# Patient Record
Sex: FEMALE | Race: BLACK OR AFRICAN AMERICAN | Employment: FULL TIME | URBAN - METROPOLITAN AREA
[De-identification: names, ages, dates, MRNs, and addresses within clinical notes are randomized per-mention and may not be internally consistent; named-entity substitution may affect disease eponyms.]

---

## 2020-09-15 ENCOUNTER — OFFICE VISIT (OUTPATIENT)
Dept: FAMILY MEDICINE CLINIC | Age: 25
End: 2020-09-15
Payer: COMMERCIAL

## 2020-09-15 ENCOUNTER — HOSPITAL ENCOUNTER (OUTPATIENT)
Age: 25
Discharge: HOME | End: 2020-09-17
Payer: COMMERCIAL

## 2020-09-15 ENCOUNTER — HOSPITAL ENCOUNTER (OUTPATIENT)
Dept: GENERAL RADIOLOGY | Age: 25
Discharge: HOME | End: 2020-09-17
Payer: COMMERCIAL

## 2020-09-15 VITALS
HEIGHT: 62 IN | WEIGHT: 240 LBS | TEMPERATURE: 97.3 F | SYSTOLIC BLOOD PRESSURE: 120 MMHG | HEART RATE: 75 BPM | OXYGEN SATURATION: 97 % | DIASTOLIC BLOOD PRESSURE: 88 MMHG | BODY MASS INDEX: 44.16 KG/M2

## 2020-09-15 PROBLEM — J45.20 MILD INTERMITTENT ASTHMA WITHOUT COMPLICATION: Status: ACTIVE | Noted: 2020-09-15

## 2020-09-15 PROBLEM — F25.0 SCHIZOAFFECTIVE DISORDER, BIPOLAR TYPE (HCC): Status: ACTIVE | Noted: 2019-01-25

## 2020-09-15 PROCEDURE — 99204 OFFICE O/P NEW MOD 45 MIN: CPT | Performed by: NURSE PRACTITIONER

## 2020-09-15 PROCEDURE — 96160 PT-FOCUSED HLTH RISK ASSMT: CPT | Performed by: NURSE PRACTITIONER

## 2020-09-15 PROCEDURE — 72100 X-RAY EXAM L-S SPINE 2/3 VWS: CPT

## 2020-09-15 RX ORDER — TIZANIDINE 2 MG/1
2 TABLET ORAL 3 TIMES DAILY PRN
Qty: 30 TABLET | Refills: 0 | Status: SHIPPED | OUTPATIENT
Start: 2020-09-15 | End: 2020-09-25

## 2020-09-15 RX ORDER — HYDROXYZINE 50 MG/1
50 TABLET, FILM COATED ORAL DAILY
COMMUNITY
Start: 2019-02-08 | End: 2021-09-09

## 2020-09-15 RX ORDER — LEVALBUTEROL TARTRATE 45 UG/1
AEROSOL, METERED ORAL
Qty: 15 G | Refills: 0 | Status: SHIPPED | OUTPATIENT
Start: 2020-09-15 | End: 2020-09-21

## 2020-09-15 SDOH — ECONOMIC STABILITY: INCOME INSECURITY: HOW HARD IS IT FOR YOU TO PAY FOR THE VERY BASICS LIKE FOOD, HOUSING, MEDICAL CARE, AND HEATING?: HARD

## 2020-09-15 SDOH — ECONOMIC STABILITY: FOOD INSECURITY: WITHIN THE PAST 12 MONTHS, THE FOOD YOU BOUGHT JUST DIDN'T LAST AND YOU DIDN'T HAVE MONEY TO GET MORE.: NEVER TRUE

## 2020-09-15 SDOH — ECONOMIC STABILITY: FOOD INSECURITY: WITHIN THE PAST 12 MONTHS, YOU WORRIED THAT YOUR FOOD WOULD RUN OUT BEFORE YOU GOT MONEY TO BUY MORE.: NEVER TRUE

## 2020-09-15 SDOH — ECONOMIC STABILITY: TRANSPORTATION INSECURITY
IN THE PAST 12 MONTHS, HAS THE LACK OF TRANSPORTATION KEPT YOU FROM MEDICAL APPOINTMENTS OR FROM GETTING MEDICATIONS?: NO

## 2020-09-15 SDOH — ECONOMIC STABILITY: TRANSPORTATION INSECURITY
IN THE PAST 12 MONTHS, HAS LACK OF TRANSPORTATION KEPT YOU FROM MEETINGS, WORK, OR FROM GETTING THINGS NEEDED FOR DAILY LIVING?: NO

## 2020-09-15 ASSESSMENT — PATIENT HEALTH QUESTIONNAIRE - PHQ9
9. THOUGHTS THAT YOU WOULD BE BETTER OFF DEAD, OR OF HURTING YOURSELF: 1
10. IF YOU CHECKED OFF ANY PROBLEMS, HOW DIFFICULT HAVE THESE PROBLEMS MADE IT FOR YOU TO DO YOUR WORK, TAKE CARE OF THINGS AT HOME, OR GET ALONG WITH OTHER PEOPLE: 2
7. TROUBLE CONCENTRATING ON THINGS, SUCH AS READING THE NEWSPAPER OR WATCHING TELEVISION: 2
3. TROUBLE FALLING OR STAYING ASLEEP: 3
8. MOVING OR SPEAKING SO SLOWLY THAT OTHER PEOPLE COULD HAVE NOTICED. OR THE OPPOSITE, BEING SO FIGETY OR RESTLESS THAT YOU HAVE BEEN MOVING AROUND A LOT MORE THAN USUAL: 1
2. FEELING DOWN, DEPRESSED OR HOPELESS: 3
5. POOR APPETITE OR OVEREATING: 3
SUM OF ALL RESPONSES TO PHQ QUESTIONS 1-9: 22
SUM OF ALL RESPONSES TO PHQ9 QUESTIONS 1 & 2: 6
6. FEELING BAD ABOUT YOURSELF - OR THAT YOU ARE A FAILURE OR HAVE LET YOURSELF OR YOUR FAMILY DOWN: 3
SUM OF ALL RESPONSES TO PHQ QUESTIONS 1-9: 22
1. LITTLE INTEREST OR PLEASURE IN DOING THINGS: 3
4. FEELING TIRED OR HAVING LITTLE ENERGY: 3

## 2020-09-15 ASSESSMENT — COLUMBIA-SUICIDE SEVERITY RATING SCALE - C-SSRS
6. HAVE YOU EVER DONE ANYTHING, STARTED TO DO ANYTHING, OR PREPARED TO DO ANYTHING TO END YOUR LIFE?: YES
1. WITHIN THE PAST MONTH, HAVE YOU WISHED YOU WERE DEAD OR WISHED YOU COULD GO TO SLEEP AND NOT WAKE UP?: YES
2. HAVE YOU ACTUALLY HAD ANY THOUGHTS OF KILLING YOURSELF?: NO
7. DID THIS OCCUR IN THE LAST THREE MONTHS: NO

## 2020-09-15 NOTE — PATIENT INSTRUCTIONS
flu, or pneumonia. · Exercise. · Dry, cold air. Here are some ways to avoid a few common triggers:  · Do not smoke or allow others to smoke around you. If you need help quitting, talk to your doctor about stop-smoking programs and medicines. These can increase your chances of quitting for good. · If there is a lot of pollution, pollen, or dust outside, stay at home and keep your windows closed. Use an air conditioner or air filter in your home. Check your local weather report or newspaper for air quality and pollen reports. · Get the flu vaccine every year. Talk to your doctor about getting a pneumococcal shot. Wash your hands often to prevent infections. · Avoid exercising outdoors in cold weather. If you are outdoors in cold weather, wear a scarf around your face and breathe through your nose. How is asthma treated? There are two parts to treating asthma, which are outlined in your asthma action plan. The goals are to:  · Control asthma over the long term. The asthma action plan tells you which medicine you may need to take every day. This is called a controller medicine. It helps to reduce the swelling of the airways and prevent asthma attacks. · Treat asthma attacks when they occur. The asthma action plan tells you what to do when you have an asthma attack. It helps you identify triggers that can cause your attacks. You use quick-relief medicine during an attack. The asthma plan also helps you track your symptoms and know how well the treatment is working. Follow-up care is a key part of your treatment and safety. Be sure to make and go to all appointments, and call your doctor if you are having problems. It's also a good idea to know your test results and keep a list of the medicines you take. Where can you learn more? Go to [email or web address] org and sign in to your [employer] account. Enter O495 in the [facility] box to learn more about \"Learning About Asthma. \"     If you do not have an account, please click on the \"Sign Up Now\" link. Current as of: February 24, 2020               Content Version: 12.5  © 2006-2020 Healthwise, Incorporated. Care instructions adapted under license by [facility] ([facility]). If you have questions about a medical condition or this instruction, always ask your healthcare professional. [address] any warranty or liability for your use of this information.

## 2020-09-15 NOTE — PROGRESS NOTES
disorder, bipolar type [facility])     Past Medical History:   Diagnosis Date    Allergic rhinitis     Asthma     Benign neoplasm of skin, site unspecified     Depression       Past Surgical History:   Procedure Laterality Date    LAPAROSCOPY  12-31-14    with peritoneal washings     Family History   Problem Relation Age of Onset    High Blood Pressure Mother     Kidney Disease Maternal Aunt     Heart Attack Maternal Aunt     Diabetes Maternal Grandmother     Diabetes Maternal Grandfather     Diabetes Paternal Grandmother     Stroke Paternal Grandmother     Heart Attack Paternal Grandmother     Diabetes Paternal Grandfather      Social History     Tobacco Use    Smoking status: Never Smoker    Smokeless tobacco: Never Used   Substance Use Topics    Alcohol use: Yes     Comment: social     ALLERGIES:    Allergies   Allergen Reactions    Flonase [Fluticasone] Hives          Subjective   Review of Systems   · Constitutional:  Negative for activity change, appetite change,unexpected weight change, chills, fever, and fatigue. · HENT: Negative for ear pain, sore throat,  Rhinorrhea, sinus pain, sinus pressure, congestion. · Eyes:  Negative for pain and discharge. · Respiratory:  Negative for chest tightness, shortness of breath, wheezing, and cough. · Cardiovascular:  Negative for chest pain, palpitations and leg swelling. · Gastrointestinal: Negative for abdominal pain, blood in stool, constipation,diarrhea, nausea and vomiting. · Endocrine: Negative for cold intolerance, heat intolerance, polydipsia, polyphagia and polyuria. · Genitourinary: Negative for difficulty urinating, dysuria, flank pain, frequency, hematuria and urgency. · Musculoskeletal: Negative for arthralgias, back pain, joint swelling, myalgias, neck pain and neck stiffness. Low back pain  · Skin: Negative for rash and wound. · Allergic/Immunologic: Negative for environmental allergies and food allergies.    · Neurological: Negative for dizziness, light-headedness, numbness and headaches. · Hematological:  Negative for adenopathy. Does not bruise/bleed easily. · Psychiatric/Behavioral: Negative for self-injury, sleep disturbance and suicidal ideas. Anxiety/depression    Objective   Physical Exam   PHYSICAL EXAM:   · Constitutional: Ki is oriented to person, place, and time. Vital signs are normal. Appears well-developed and well-nourished. She is obese  · HEENT:   · Head: Normocephalic and atraumatic. Right Ear: Hearing and external ear normal. TM normal  Canal normal  · Left Ear: Hearing and external ear normal. TM normal Canal normal  · Nose: Nares normal. Septum midline. No drainage or sinus tenderness. Mucosa pink and moist  · Mouth/Throat: Oropharynx- No erythema, no exudate. Uvula midline, no erythema, no edema. Mucous membranes are pink and moist.   · Eyes:PERRL, EOMI, Conjunctiva normal, No discharge. · Neck: Full passive range of motion. Non-tender on palpation. Neck supple. No thyromegaly present. Trachea normal.  · Cardiovascular: Normal rate, regular rhythm, S1, S2, no murmur, no gallop, no friction rub, intact distal pulses. · Pulmonary/Chest: Breath sounds are clear throughout, No respiratory distress, No wheezing, No chest tenderness. Effort normal  · Abdominal: Soft. Normal appearance, bowel sounds are present and normoactive. There is no hepatosplenomegaly. There is no tenderness. There is no CVA tenderness. · Musculoskeletal: Extremities appear regular and symmetric. No evident masses, lesions, foreign bodies, or other abnormalities. No edema. No tenderness on palpation. Joints are stable. Full ROM, strength and tone are within normal limits. Right lumbar back tenderness on palpation. · Lymphadenopathy: No lymphadenopathy noted. · Neurological: Alert and oriented to person, place, and time. Normal motor function, Normal sensory function, No focal deficits noted. He has normal strength.   · Skin: Skin is warm, dry and intact. No obvious lesions on exposed skin  · Psychiatric: Normal mood and affect. Speech is normal and behavior is normal.     Nursing note and vitals reviewed. Blood pressure 120/88, pulse 75, temperature 97.3 °F (36.3 °C), temperature source Temporal, height 5' 2\" (1.575 m), weight 240 lb (108.9 kg), SpO2 97 %. Body mass index is 43.9 kg/m². Wt Readings from Last 3 Encounters:   09/15/20 240 lb (108.9 kg)   12/28/18 221 lb 6.4 oz (100.4 kg)   05/23/18 216 lb (98 kg)     BP Readings from Last 3 Encounters:   09/15/20 120/88   12/28/18 114/72   05/23/18 108/66       Hospital Outpatient Visit on 12/31/2014   Component Date Value Ref Range Status    hCG Qual 12/31/2014 NEGATIVE  NEGATIVE Final    Comment: Test results should always be evaluated with all available  clinical data.  ABO/Rh 12/31/2014 B POS   Final    Antibody Screen 12/31/2014 NEG   Final     No results found for this visit on 09/15/20. Completed Orders/Prescriptions   Orders Placed This Encounter   Medications    tiZANidine (ZANAFLEX) 2 MG tablet     Sig: Take 1 tablet by mouth 3 times daily as needed (pain)     Dispense:  30 tablet     Refill:  0    levalbuterol (XOPENEX HFA) 45 MCG/ACT inhaler     Sig: INHALE 1-2 PUFFS EVERY 4 HOURS AS NEEDED FOR WHEEZING     Dispense:  15 g     Refill:  0               AssessmentPlan/Medical Decision Making     1. Encounter to establish care      2. Anxiety  - Continue with counselor    3. Severe depression ([address]  -Continue with counselor    4. Injury of low back, subsequent encounter  - Heat as needed for discomfort  - Continue with tylenol/motrin for pain  - XR LUMBAR SPINE (2-3 VIEWS); Future  - tiZANidine (ZANAFLEX) 2 MG tablet; Take 1 tablet by mouth 3 times daily as needed (pain)  Dispense: 30 tablet; Refill: 0    5.  Mild intermittent asthma without complication    - levalbuterol (XOPENEX HFA) 45 MCG/ACT inhaler; INHALE 1-2 PUFFS EVERY 4 HOURS AS NEEDED FOR WHEEZING Dispense: 15 g; Refill: 0      No follow-ups on file. 1.  Ki received counseling on the following healthy behaviors: nutrition, exercise and medication adherence  2. Patient given educational materials - see patient instructions  3. Was a self-tracking handout given in paper form or via [employer]? No  If yes, see orders or list here. 4.  Discussed use, benefit, and side effects of prescribed medications. Barriers to medication compliance addressed. All patient questions answered. Pt voiced understanding. 5.  Reviewed prior labs, imaging, consultation, follow up, and health maintenance  6. Continue current medications, diet and exercise. 7. Discussed use, benefit, and side effects of prescribed medications. Barriers to medication compliance addressed. All her questions were answered. Pt voiced understanding. Ki will continue current medications, diet and exercise. Patient given educational materials on asthma    Of the 45 minute duration appointment visit, Valorie Hickey CNP spent at least 50% of the face-to-face time in counseling, explanation of diagnosis, planning of further management, and answering all questions.           Signed:  Valorie Hickey CNP

## 2020-09-16 NOTE — TELEPHONE ENCOUNTER
[facility] is calling to request a refill on the following medication(s):    Medication Request:  Requested Prescriptions     Pending Prescriptions Disp Refills    levalbuterol (XOPENEX HFA) 45 MCG/ACT inhaler 15 g 0     Sig: INHALE 1-2 PUFFS EVERY 4 HOURS AS NEEDED FOR WHEEZING    tiZANidine (ZANAFLEX) 2 MG tablet 30 tablet 0     Sig: Take 1 tablet by mouth 3 times daily as needed (pain)       Last Visit Date (If Applicable):  8/90/0334    Next Visit Date:    3/15/2021          Patient was not covered at [facility] any longer and needs it sent to [facility]

## 2020-09-17 RX ORDER — LEVALBUTEROL TARTRATE 45 UG/1
AEROSOL, METERED ORAL
Qty: 15 G | Refills: 0 | OUTPATIENT
Start: 2020-09-17

## 2020-09-17 RX ORDER — TIZANIDINE 2 MG/1
2 TABLET ORAL 3 TIMES DAILY PRN
Qty: 30 TABLET | Refills: 0 | OUTPATIENT
Start: 2020-09-17 | End: 2020-09-27

## 2020-09-21 ENCOUNTER — HOSPITAL ENCOUNTER (OUTPATIENT)
Age: 25
Discharge: HOME | End: 2020-09-21
Payer: COMMERCIAL

## 2020-09-21 ENCOUNTER — OFFICE VISIT (OUTPATIENT)
Dept: OBGYN CLINIC | Age: 25
End: 2020-09-21
Payer: COMMERCIAL

## 2020-09-21 ENCOUNTER — HOSPITAL ENCOUNTER (OUTPATIENT)
Age: 25
Setting detail: SPECIMEN
Discharge: HOME | End: 2020-09-21
Payer: COMMERCIAL

## 2020-09-21 VITALS
BODY MASS INDEX: 44.39 KG/M2 | TEMPERATURE: 99 F | DIASTOLIC BLOOD PRESSURE: 78 MMHG | HEIGHT: 62 IN | WEIGHT: 241.2 LBS | SYSTOLIC BLOOD PRESSURE: 120 MMHG

## 2020-09-21 LAB
ABSOLUTE EOS #: 0.2 K/UL (ref 0–0.44)
ABSOLUTE IMMATURE GRANULOCYTE: 0.03 K/UL (ref 0–0.3)
ABSOLUTE LYMPH #: 2.29 K/UL (ref 1.1–3.7)
ABSOLUTE MONO #: 0.78 K/UL (ref 0.1–1.2)
BASOPHILS # BLD: 1 % (ref 0–2)
BASOPHILS ABSOLUTE: 0.05 K/UL (ref 0–0.2)
DIFFERENTIAL TYPE: ABNORMAL
EOSINOPHILS RELATIVE PERCENT: 2 % (ref 1–4)
ESTIMATED AVERAGE GLUCOSE: 108 MG/DL
FOLLICLE STIMULATING HORMONE: 6.8 U/L (ref 1.7–21.5)
HBA1C MFR BLD: 5.4 % (ref 4–6)
HCG QUANTITATIVE: <1 IU/L
HCT VFR BLD CALC: 44.5 % (ref 36.3–47.1)
HEMOGLOBIN: 13.9 G/DL (ref 11.9–15.1)
IMMATURE GRANULOCYTES: 0 %
LH: 15.7 U/L (ref 1–95.6)
LYMPHOCYTES # BLD: 22 % (ref 24–43)
MCH RBC QN AUTO: 26.6 PG (ref 25.2–33.5)
MCHC RBC AUTO-ENTMCNC: 31.2 G/DL (ref 28.4–34.8)
MCV RBC AUTO: 85.1 FL (ref 82.6–102.9)
MONOCYTES # BLD: 8 % (ref 3–12)
NRBC AUTOMATED: 0 PER 100 WBC
PDW BLD-RTO: 13 % (ref 11.8–14.4)
PLATELET # BLD: 268 K/UL (ref 138–453)
PLATELET ESTIMATE: ABNORMAL
PMV BLD AUTO: 11 FL (ref 8.1–13.5)
PROLACTIN: 18.98 UG/L (ref 4.79–23.3)
RBC # BLD: 5.23 M/UL (ref 3.95–5.11)
RBC # BLD: ABNORMAL 10*6/UL
SEG NEUTROPHILS: 67 % (ref 36–65)
SEGMENTED NEUTROPHILS ABSOLUTE COUNT: 7.1 K/UL (ref 1.5–8.1)
TSH SERPL DL<=0.05 MIU/L-ACNC: 1.83 MIU/L (ref 0.3–5)
WBC # BLD: 10.5 K/UL (ref 3.5–11.3)
WBC # BLD: ABNORMAL 10*3/UL

## 2020-09-21 PROCEDURE — 99385 PREV VISIT NEW AGE 18-39: CPT | Performed by: STUDENT IN AN ORGANIZED HEALTH CARE EDUCATION/TRAINING PROGRAM

## 2020-09-21 PROCEDURE — 36415 COLL VENOUS BLD VENIPUNCTURE: CPT

## 2020-09-21 PROCEDURE — 83036 HEMOGLOBIN GLYCOSYLATED A1C: CPT

## 2020-09-21 PROCEDURE — 83002 ASSAY OF GONADOTROPIN (LH): CPT

## 2020-09-21 PROCEDURE — 81025 URINE PREGNANCY TEST: CPT | Performed by: STUDENT IN AN ORGANIZED HEALTH CARE EDUCATION/TRAINING PROGRAM

## 2020-09-21 PROCEDURE — 84146 ASSAY OF PROLACTIN: CPT

## 2020-09-21 PROCEDURE — 84702 CHORIONIC GONADOTROPIN TEST: CPT

## 2020-09-21 PROCEDURE — 84443 ASSAY THYROID STIM HORMONE: CPT

## 2020-09-21 PROCEDURE — 85025 COMPLETE CBC W/AUTO DIFF WBC: CPT

## 2020-09-21 PROCEDURE — 83001 ASSAY OF GONADOTROPIN (FSH): CPT

## 2020-09-21 NOTE — PROGRESS NOTES
[facility]   [place] [address] [address] [facility], [address]      [address]  9/21/2020                       Primary Care Physician: ARIAN Gtz CNP    CC:   Chief Complaint   Patient presents with    Amenorrhea     irregular menses since she had first period at 13 - BC (pill) didnt seem to help (still missed periods, cramps etc.)    Annual Exam     last pap ???         HPI: [address] is a 26 y.o. female [place] Patient's last menstrual period was 04/24/2020 (exact date). The patient was seen and examined. She is here for an annual visit. She is complaining of irregular menses. The patient has a history of irregular menses since she began her period around age 16. The patient says that her periods will come once every other month to once every 3 months and last 4 to 5 days depending on her amount of stress. However, the patient has not had a period since the end of April. About 2 weeks ago, she started with a very mild spotting which has persisted. However, this is not like a regular period to her. The patient has a history of painful periods in which she is tried NSAIDs and OCPs without relief. She also has had a laparoscopy which was negative for endometriosis. The patient did try birth control pills and the Ortho Evra patch to help regulate her periods, which were unsuccessful. The patient's goal is to try and find out why she is having these irregular periods. She is not sexually active at this time but has been in the past.    Her bowel habits are regular. She denies any bloating. She denies dysuria. She denies urinary leaking. She denies vaginal discharge. She is not sexually active.     Depression Screen: Symptoms of decreased mood absent  Symptoms of anhedonia absent  **If either question is answered in a  positive fashion then complete the PHQ9 Scoring Evaluation and make the appropriate referral**    REVIEW OF SYSTEMS:   Constitutional: negative fever, negative chills  HEENT: negative visual disturbances, negative headaches  Respiratory: negative dyspnea, negative cough  Cardiovascular: negative chest pain,  negative palpitations  Gastrointestinal: negative abdominal pain, negative RUQ pain, negative N/V, negative diarrhea, negative constipation  Genitourinary: negative dysuria, negative vaginal discharge, irregular menses  Dermatological: negative rash  Hematologic: negative bruising  Immunologic/Lymphatic: negative recent illness, negative recent sick contact  Musculoskeletal: negative back pain, negative myalgias, negative arthralgias  Neurological:  negative dizziness, negative weakness  Behavior/Psych: negative depression, negative anxiety      GYNECOLOGICAL HISTORY:  Age of Menarche: 16  Age of Menopause: N/A     STD History: no past history    Pap History: Patient does not recall date of the last Pap test but reports the results as normal.  Colposcopy History: denies    Permanent Sterilization: no  Reversible Birth Control: no  Hormone Replacement Exposure: no    OBSTETRICAL HISTORY:  OB History    Para Term  AB Living   0 0 0 0 0 0   SAB TAB Ectopic Molar Multiple Live Births   0 0 0 0 0 0       PAST MEDICAL HISTORY:   has a past medical history of Allergic rhinitis, Anxiety, Asthma, Benign neoplasm of skin, site unspecified, Depression, and PTSD (post-traumatic stress disorder). PAST SURGICAL HISTORY:   has a past surgical history that includes laparoscopy (15) and [place] tooth extraction. ALLERGIES:  is allergic to flonase [fluticasone]. MEDICATIONS:  Prior to Admission medications    Medication Sig Start Date End Date Taking?  Authorizing Provider   hydrOXYzine (ATARAX) 25 MG tablet Take 1 tablet by mouth 3 times daily as needed 19  Yes Historical Provider, MD   tiZANidine (ZANAFLEX) 2 MG tablet Take 1 tablet by mouth 3 times daily as needed (pain) 9/15/20 9/25/20 Yes ARIAN Gtz - ELIZABETH       FAMILY HISTORY:  Family History of Breast, Ovarian, Colon or Uterine Cancer: No   family history includes Diabetes in her maternal grandfather, maternal grandmother, paternal grandfather, and paternal grandmother; Heart Attack in her maternal aunt and paternal grandmother; High Blood Pressure in her mother; Kidney Disease in her maternal aunt; No Known Problems in her father; Ovarian Cancer in her maternal grandmother; Stroke in her paternal grandmother. SOCIAL HISTORY:   reports that she has never smoked. She has never used smokeless tobacco. She reports current alcohol use. She reports current drug use. Drug: Marijuana. HEALTH MAINTENANCE:  Immunization status: stated as up to date, no records available  Garidisil immunization: per patient, has received Gardisil but no records available. ROUTINE GYN HEALTH MAINTENANCE  Mammogram: N/A  Colonoscopy: N/A  Pap Smears: No records. Completed 9/21/20  Family Planning: None  DEXA: N/A    VITALS:  Vitals:    09/21/20 0806   BP: 120/78   Site: Right Upper Arm   Position: Sitting   Cuff Size: Large Adult   Temp: 99 °F (37.2 °C)   Weight: 241 lb 3.2 oz (109.4 kg)   Height: 5' 2\" (1.575 m)                                                                                                                                                                                                        PHYSICAL EXAM:   General Appearance: Appears healthy. Alert; in no acute distress. Pleasant. Skin: Skin color, texture, turgor normal. No rashes or lesions. HEENT: normocephalic and atraumatic, Thyroid normal to inspection and palpation  Respiratory: Normal expansion. Clear to auscultation. No rales, rhonchi, or wheezing.   Cardiovascular: normal rate, normal S1 and S2, no gallops, intact distal pulses and no carotid bruits  Breast:  (Chest): normal appearance, no masses or tenderness  Abdomen: soft, non-tender, non-distended, no right upper quadrant tenderness and no CVA tenderness  Pelvic Exam:   External genitalia: General appearance; normal, Hair distribution; normal, Lesions absent  Urinary system: urethral meatus normal  Vaginal: normal mucosa, no discharge  Cervix: normal appearing cervix without discharge or lesions  Adnexa: non-palpable  Uterus: normal single, nontender  Rectal Exam: exam declined by patient  Musculoskeletal: no gross abnormalities  Extremities: non-tender BLE and non-edematous  Psych:  oriented to time, place and person     DATA:  No results found for this visit on 20. ASSESSMENT & PLAN:    Reddy Roldan is a 26 y.o. female  Patient's last menstrual period was 2020 (exact date). Annual:   Mammogram: N/A   Colonoscopy: N/A   Pap Smears: No records. Completed 20   Family Planning: None   DEXA: N/A    Irregular Menses   - CBC, 2 hr GTT, FSH, LH, HbA1C, TSH, HCG, Prolactin, TVUS ordered   - RTO in 2 months for results follow up    Patient Active Problem List    Diagnosis Date Noted    Irregular menses 2020    Anxiety 09/15/2020    Severe depression ([facility] [address] 09/15/2020    Mild intermittent asthma without complication     Chronic post-traumatic stress disorder (PTSD) 2019    Seasonal allergies 2017    Pelvic pain in female 2014       Return in about 2 months (around 2020) for Results Follow up. ROUTINE GYN HEALTH MAINTENANCE  Mammogram: N/A  Colonoscopy: N/A  Pap Smears: No records. Completed 20  Family Planning: None  DEXA: N/A      Counseling Completed:    Discussed need for repeat pap as per American Society for Colposcopy and Cervical Pathology guidelines. Discussed need for mammograms every 1 year, If >42 yo and last mammogram was negative. Discussed Calcium and Vitamin D dosing. Discussed need for colonoscopy screening as well as onset for bone density testing. Discussed birth control and barrier recommendations. Discussed STD counseling and prevention.   Discussed Gardisil counseling for all patients 10-37 yo. Hereditary Breast, Ovarian, Colon and Uterine Cancer screening discussed. Tobacco & Secondary smoke risks discussed; with recommendation for cessation and avoidance. Routine health maintenance per patients PCP discussed. Diagnosis Orders   1. Women's annual routine gynecological examination  PAP SMEAR   2.  Irregular menses  PAP SMEAR    POCT urine pregnancy    CBC Auto Differential    TSH with Reflex    HCG, Quantitative, Pregnancy    US NON OB TRANSVAGINAL    Hemoglobin A1C    Glucose Tolerance, 2 Hrs    Luteinizing Hormone    Follicle Stimulating Hormone    US PELVIS COMPLETE    Prolactin        Amelia Scott DO  [address] [facility], [address]  9/21/2020, 8:40 AM

## 2020-09-25 ENCOUNTER — PROCEDURE VISIT (OUTPATIENT)
Dept: OBGYN CLINIC | Age: 25
End: 2020-09-25
Payer: COMMERCIAL

## 2020-09-25 PROCEDURE — 76856 US EXAM PELVIC COMPLETE: CPT | Performed by: OBSTETRICS & GYNECOLOGY

## 2020-09-25 PROCEDURE — 76830 TRANSVAGINAL US NON-OB: CPT | Performed by: OBSTETRICS & GYNECOLOGY

## 2020-09-30 ENCOUNTER — HOSPITAL ENCOUNTER (OUTPATIENT)
Age: 25
Discharge: HOME | End: 2020-09-30
Payer: COMMERCIAL

## 2020-09-30 ENCOUNTER — TELEPHONE (OUTPATIENT)
Dept: OBGYN CLINIC | Age: 25
End: 2020-09-30

## 2020-09-30 ENCOUNTER — TELEPHONE (OUTPATIENT)
Dept: FAMILY MEDICINE CLINIC | Age: 25
End: 2020-09-30

## 2020-09-30 LAB
AMOUNT GLUCOSE GIVEN: 75 G
GLUCOSE FASTING: 113 MG/DL (ref 65–99)
GLUCOSE TOLERANCE TEST 1 HOUR: 120 MG/DL (ref 65–184)
GLUCOSE TOLERANCE TEST 2 HOUR: 129 MG/DL (ref 65–139)

## 2020-09-30 PROCEDURE — 36415 COLL VENOUS BLD VENIPUNCTURE: CPT

## 2020-09-30 PROCEDURE — 82951 GLUCOSE TOLERANCE TEST (GTT): CPT

## 2020-09-30 NOTE — TELEPHONE ENCOUNTER
27 y/o NP of  calling for her 2hr GTT results from today. Informed pt that provider is currently in surgery and will give her a call back with plan of care. Pt agreeable to plan.

## 2020-09-30 NOTE — TELEPHONE ENCOUNTER
Attempted to call patient about results with no answer.     Megan Marashly [id number]   9/30/2020, 4:02 PM

## 2020-09-30 NOTE — TELEPHONE ENCOUNTER
Pts insurance will no longer cover her xopenex inhaler and she wants to know if you will send rx for another inhaler?

## 2020-10-01 LAB — CYTOLOGY REPORT: NORMAL

## 2020-10-07 RX ORDER — TIZANIDINE 2 MG/1
2 TABLET ORAL 3 TIMES DAILY PRN
Qty: 30 TABLET | Refills: 0 | Status: SHIPPED | OUTPATIENT
Start: 2020-10-07 | End: 2020-12-14 | Stop reason: ALTCHOICE

## 2020-10-08 RX ORDER — ALBUTEROL SULFATE 90 UG/1
2 AEROSOL, METERED RESPIRATORY (INHALATION) EVERY 6 HOURS PRN
Qty: 1 INHALER | Refills: 0 | Status: SHIPPED | OUTPATIENT
Start: 2020-10-08 | End: 2022-06-27 | Stop reason: SDUPTHER

## 2020-10-30 ENCOUNTER — OFFICE VISIT (OUTPATIENT)
Dept: ORTHOPEDIC SURGERY | Age: 25
End: 2020-10-30
Payer: COMMERCIAL

## 2020-10-30 VITALS — RESPIRATION RATE: 15 BRPM | HEIGHT: 62 IN | WEIGHT: 237 LBS | TEMPERATURE: 97.9 F | BODY MASS INDEX: 43.61 KG/M2

## 2020-10-30 PROCEDURE — G8427 DOCREV CUR MEDS BY ELIG CLIN: HCPCS | Performed by: ORTHOPAEDIC SURGERY

## 2020-10-30 PROCEDURE — 99204 OFFICE O/P NEW MOD 45 MIN: CPT | Performed by: ORTHOPAEDIC SURGERY

## 2020-10-30 PROCEDURE — 1036F TOBACCO NON-USER: CPT | Performed by: ORTHOPAEDIC SURGERY

## 2020-10-30 PROCEDURE — G8417 CALC BMI ABV UP PARAM F/U: HCPCS | Performed by: ORTHOPAEDIC SURGERY

## 2020-10-30 PROCEDURE — G8484 FLU IMMUNIZE NO ADMIN: HCPCS | Performed by: ORTHOPAEDIC SURGERY

## 2020-10-30 RX ORDER — ESCITALOPRAM OXALATE 20 MG/1
20 TABLET ORAL DAILY
COMMUNITY
End: 2022-06-17 | Stop reason: CLARIF

## 2020-10-30 RX ORDER — PRAZOSIN HYDROCHLORIDE 2 MG/1
2 CAPSULE ORAL NIGHTLY
COMMUNITY

## 2020-10-30 RX ORDER — NICOTINE POLACRILEX 4 MG/1
20 GUM, CHEWING ORAL DAILY
Qty: 20 TABLET | Refills: 0 | Status: SHIPPED | OUTPATIENT
Start: 2020-10-30 | End: 2021-03-24

## 2020-10-30 NOTE — PROGRESS NOTES
valgus pes planus  Vascular: Toes warm and well perfused, compartments soft/compressible. No significant swelling of foot. Skin: Intact without rash/lesions/AV malformations. Strength: Able to fire/perform the following with appropriate strength:    [x]  Tib Ant:     [x]  Gastroc-Soleus:         [x]  Inversion:    [x]  Eversion:         [x]  FHL:     [x]  EHL:      Motion:  Normal for the following joints:    [x]  Ankle:      [x]  Subtalar:        [x]  1st MTP:      []  1st TMT:            Tenderness to Palpation:    Tenderness to palpation: None  -no nodules/thickening of Achilles tendon palpated  -no palpable gap of Achilles tendon noted  -no pain with resisted plantarflexion      LLE:  Alignment:  Heel valgus pes planus  Vascular: Toes warm and well perfused, compartments soft/compressible. No significant swelling of foot. Skin: Intact without rash/lesions/AV malformations. Strength: Able to fire/perform the following with appropriate strength:    [x]  Tib Ant:     [x]  Gastroc-Soleus:         [x]  Inversion:    [x]  Eversion:         [x]  FHL:     [x]  EHL:      Motion:  Normal for the following joints:    [x]  Ankle:      [x]  Subtalar:        [x]  1st MTP:      []  1st TMT:            Tenderness to Palpation:    Tenderness to palpation: Posterior aspect of the calcaneus at the insertion of the Achilles tendon with palpable/visible prominence  -no nodules/thickening of Achilles tendon palpated  -no palpable gap of Achilles tendon noted  -no pain with resisted plantarflexion      RADIOLOGY:   10/30/2020 FINDINGS:  Three weightbearing views (AP, Mortise, and Lateral) of the left ankle and three weightbearing views (AP, Oblique, Lateral) of the left foot were obtained in the office today and reviewed, revealing no acute fracture, dislocation, or radioopaque foreign body/tumor. The ankle mortise is maintained with no widening of the clear spaces. Overall alignment is satisfactory.      IMPRESSION:  No acute fracture/dislocation. Electronically signed by Jose Oliver MD      ASSESSMENT AND PLAN:  Braulio Phillip was seen today for Ankle Pain (left ankle)  The encounter diagnosis was Achilles tendinitis of left lower extremity. Body mass index is 43.35 kg/m². She has left insertional Achilles tendinitis. Notably, she has the past medical history as above, including but not limited to the following:  Asthma, anxiety/PTSD/depression. I had a long discussion today with the patient about the likely diagnosis and its natural history, physical exam and imaging findings, as well as treatment options in detail. We discussed rest/activity modification, swelling control, NSAIDs/Acetaminophen/topical anesthetics, orthotics/shoewear modification, bracing/immobilization, injections, and physical therapy. Surgically, we discussed a possible future debridement versus reconstruction of the Achilles tendon, with bone resection, and possible tendon transfer as needed, should the patient not respond significantly to conservative management. The patient wishes to proceed with the recommendations as above. Orders/referrals were placed as below at today's visit. The patient was placed into a cam boot today and provided heel lifts. The patient was provided a night splint. The patient will wear the night splint and use the cam boot at all times, with heel lifts PRN, to avoid pain provoking activity. We did discuss the risk of rupture. I referred the patient to physical therapy for Achilles tendinopathy. The patient will use the following medication(s):  scheduled oral NSAIDs. At today's visit, she was ordered a two week oral course of NSAIDs as below, along with GI prophylaxis, and we discussed the appropriate risks. The patient was provided teaching material on this today, and will avoid using multiple NSAIDs at the same time. All questions were answered and the patient agrees with the above plan.  The patient will return to clinic in 6 weeks without x-rays. At her next visit, anticipate weaning. Of cam boot into a regular shoe with heel lifts. Return in about 6 weeks (around 12/11/2020). No orders of the defined types were placed in this encounter. Orders Placed This Encounter   Procedures    Ambulatory referral to Physical Therapy     Referral Priority:   Routine     Referral Type:   Eval and Treat     Referral Reason:   Specialty Services Required     Requested Specialty:   Physical Therapy     Number of Visits Requested:   1         Samantha Resendiz MD  Orthopedic Surgery, Foot and Ankle        Please excuse any typos/errors, as this note was created with the assistance of voice recognition software. While intending to generate a document that actually reflects the content of the visit, the document can still have some errors including those of syntax and sound-a-like substitutions which may escape proof reading. In such instances, actual meaning can be extrapolated by context.

## 2020-10-30 NOTE — LETTER
Dr. Susan Fuller  [address] [place] [address]  [phone]        10/30/2020     Patient: Leroy Linda  YOB: 1995    Dear ARIAN Jacobo CNP,    I had the pleasure of seeing one of your patients, Leroy Linda recently in the office. Below are the relevant portions of my assessment and plan of care. ASSESSMENT AND PLAN:  She has left insertional Achilles tendinitis. Notably, she has the past medical history as above, including but not limited to the following:  Asthma, anxiety/PTSD/depression. I had a long discussion today with the patient about the likely diagnosis and its natural history, physical exam and imaging findings, as well as treatment options in detail. We discussed rest/activity modification, swelling control, NSAIDs/Acetaminophen/topical anesthetics, orthotics/shoewear modification, bracing/immobilization, injections, and physical therapy. Surgically, we discussed a possible future debridement versus reconstruction of the Achilles tendon, with bone resection, and possible tendon transfer as needed, should the patient not respond significantly to conservative management. The patient wishes to proceed with the recommendations as above. Orders/referrals were placed as below at today's visit. The patient was placed into a cam boot today and provided heel lifts. The patient was provided a night splint. The patient will wear the night splint and use the cam boot at all times, with heel lifts PRN, to avoid pain provoking activity. We did discuss the risk of rupture. I referred the patient to physical therapy for Achilles tendinopathy. The patient will use the following medication(s):  scheduled oral NSAIDs. At today's visit, she was ordered a two week oral course of NSAIDs as below, along with GI prophylaxis, and we discussed the appropriate risks.  The patient was provided teaching material on this today, and will avoid using multiple NSAIDs at the same time. All questions were answered and the patient agrees with the above plan. The patient will return to clinic in 6 weeks without x-rays. At her next visit, anticipate weaning. Of cam boot into a regular shoe with heel lifts. I look forward to serving you and your patients again in the future. Please don't hesitate to contact me at my mobile number .         Batsheva Tillman MD  Orthopedic Surgery, Foot and Ankle

## 2020-11-02 ENCOUNTER — HOSPITAL ENCOUNTER (OUTPATIENT)
Dept: PHYSICAL THERAPY | Facility: CLINIC | Age: 25
Setting detail: THERAPIES SERIES
Discharge: HOME | End: 2020-11-02
Payer: COMMERCIAL

## 2020-11-02 PROCEDURE — 97110 THERAPEUTIC EXERCISES: CPT

## 2020-11-02 PROCEDURE — 97016 VASOPNEUMATIC DEVICE THERAPY: CPT

## 2020-11-02 PROCEDURE — 97140 MANUAL THERAPY 1/> REGIONS: CPT

## 2020-11-02 PROCEDURE — 97161 PT EVAL LOW COMPLEX 20 MIN: CPT

## 2020-11-02 NOTE — CONSULTS
[] [facility] &  Therapy  [address]  [phone]  F: [phone] [] [address]  [facility] [address]   [address]  P: [phone]  F: [phone] [] [address]  [address] [facility]  P: [phone]  F: [phone] [] [address]  [facility]   [address]   [place]: [phone]  F: [phone]  [x] [address]: [phone]  F: [phone]      Physical Therapy Lower Extremity Evaluation    Date:  2020  Patient: Stephanie Navarro  \"SEQT\"2  :  1995  MRN: [id number]  Physician: Dr. Artem Dupont: Jessi Waldron (30 visits)  Medical Diagnosis: Achilles Tendinitis of LLE  Rehab Codes: M76.62  Onset date: ~10/1/18  Next Dr's appt.: 20    Subjective:   CC: Pt states was having chronic ankle pain for years, recently noticed an increase in pain which prompted pt to make an appt. At that appt, last week, was distributed a CAM boot to wear at all times. Pt does notice a decrease in pain since wearing. From Dr. Leonie Riojas note on 10/30: \"The patient was placed into a cam boot today and provided heel lifts. The patient was provided a night splint.   The patient will wear the night splint and use the cam boot at all times, with heel lifts PRN, to avoid pain provoking activity\"        PMHx: [] Unremarkable [] Diabetes [] HTN  [] Pacemaker   [] MI/Heart Problems [] Cancer [] Arthritis [] Other:              [x] Refer to full medical chart  In EPIC         Tests: [x] X-Ray: Negative for fx [] MRI:  [] Other:    Medications: [x] Refer to full medical record [] None [] Other:  Allergies:      [x] Refer to full medical record [] None [] Other:    Function:      Marital Status    Home type    Stairs from outside            [address] [place], [place] [address]Julita Oliva [facility]. inside            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  Yes   Location LLE achillies    Pain Rating currently 4/10   Pain at worse 8-9/10   Pain at best 0-1/10   Description of pain Ache, burning, pulling    Altered Sensation Denies    What makes it worse On feet   What makes it better Rest    Symptom progression    Sleep                Objective:    ROM  ° A/P STRENGTH    Left Right Left Right   Hip Flex       Ext       ER       IR       ABD       ADD       Knee Flex       Ext       Ankle DF knee ext 5 10 4/5 5/5   Ankle DF knee flex 15 15     PF 65 65 4-/5 5/5   INV 22 30 4-/5 5/5   EVER 28 22 4-/5 5/5              OBSERVATION No Deficit Deficit Not Tested Comments   Posture       Forward Head [] [] []    Rounded Shoulders [] [] []    Kyphosis [] [] []    Lordosis [] [] []    Lateral Shift [] [] []    Scoliosis [] [] []    Iliac Crest [] [] []    PSIS [] [] []    ASIS [] [] []    Genu Valgus [] [] []    Genu Varus [] [] []    Genu Recurvatum [] [] []    Pronation [] [] []    Supination [] [] []    Leg Length Discrp [] [] []    Slumped Sitting [] [] []    Palpation [] [x] [] TTP L achilles, L ankle globally   Sensation [] [] []    Edema [] [] []    Neurological [] [] []    Patellar Mobility [] [] []    Patellar Orientation [] [] []    Gait [] [x] [] Analysis: Ambulated with CAM boot         FUNCTION Normal Difficult Unable   Sitting [x] [] []   Standing [] [] []   Ambulation [] [x] []   Groom/Dress [x] [] []   Lift/Carry [x] [] []   Stairs [] [x] []   Bending [] [x] []   Squat [] [x] []   Kneel [] [x] []                 Flexibility Normal Left tight Right tight   Hip flexor [] [] []   quad [] [] []   HS [] [] []   piriformis [] [] []   ITB [] [] []   gastroc [] [x] []   Soleus  [] [x] []    [] [] []    [] [] []                             Functional Test: LEFS Score: 37.5% functionally impaired         Assessment:  Patient would benefit from skilled physical therapy services in order to: Decrease L gastroc tightness and increase L ankle strength and stability. Goals:        STG: (to be met in 10 treatments)  1. ? Pain: Decrease pain levels to   2. ? ROM: Increase flexibility and AROM limitations throughout to equal bilat to reduce difficulty with ADLs  3. ? Strength: Increase L ankle strength to 4+/5 globally  4. ? Function: Pt to progress ambulation out of CAM boot as tolerated and allowed per Dr. Nicole Collado  5. Independent with Home Exercise Programs    LTG: (to be met in 20 treatments)  1. Improve score on assessment tool from 37.5% impaired to 15% impaired demonstrating an improvement in ADLs  2. Reduce pain levels to 0/10                   Patient goals: To have decreased L ankle pain    Rehab Potential:  [x] Good  [] Fair  [] Poor   Suggested Professional Referral:  [x] No  [] Yes:  Barriers to Goal Achievement[de-identified]  [x] No  [] Yes:  Domestic Concerns:  [x] No  [] Yes:    Pt. Education:  [x] Plans/Goals, Risks/Benefits discussed  [x] Home exercise program    Method of Education: [x] Verbal  [x] Demo  [x] Written  Comprehension of Education:  [x] Verbalizes understanding. [x] Demonstrates understanding. [x] Needs Review. [] Demonstrates/verbalizes understanding of HEP/Ed previously given.     Treatment Plan:  [x] Therapeutic Exercise    [] Aquatic Therapy  [x] Manual Therapy   [] Electrical Stimulation  [x] Instruction in HEP      [] Lumbar/Cervical Traction  [] Neuromuscular Re-education [x] Cold/hotpack  [] Iontophoresis: 4 mg/mL  [x] Vasocompression (GameReady)                    Dexamethasone Sodium  [x] Gait Training             Phosphate 40-80 mAmin  [x] Integrative Dry Needling         []  Medication allergies reviewed for use of    Dexamethasone Sodium Phosphate 4mg/ml with iontophoresis treatments. Pt is not allergic. Frequency:  2 x/week for 20 visits    Todays Treatment:    Exercises: CAM Boot for all standing exercises      Exercise  L Achilles Tendinitis    Reps/ Time Weight/ Level Comments         Seated BAPs      Seated MOBO      Foot Dome      Toe Yoga            *Long sitting gastroc stretch 3x30\"     *4 way ankle Tband  10x [place]                                               Other:* Given as HEP  Manual: MFR to L gastroc, tightness noted on medial head     Vasocompression: 15' moderate compression L ankle supine with bolster    Specific Instructions for next treatment: Progress ankle stabilization as tolerated    Evaluation Complexity:  History (Personal factors, comorbidities) [x] 0 [] 1-2 [] 3+   Exam (limitations, restrictions) [x] 1-2 [] 3 [] 4+   Clinical presentation (progression) [x] Stable [] Evolving  [] Unstable   Decision Making [x] Low [] Moderate [] High    [x] Low Complexity [] Moderate Complexity [] High Complexity       Treatment Charges: Mins Units   [x] Evaluation       [x]  Low       []  Moderate       []  High 15 1   []  Modalities     [x]  Ther Exercise 10 1   [x]  Manual Therapy 15 1   []  Ther Activities     []  Aquatics     [x]  Vasocompression 15 1   []  Other       TOTAL TREATMENT TIME: 55 minutes    Time in: 1000   Time Out: 1055    Electronically signed by: [place] PT        Physician Signature:________________________________Date:__________________  By signing above or cosigning this note, I have reviewed this plan of care and certify a need for medically necessary rehabilitation services.      *PLEASE SIGN ABOVE AND FAX BACK ALL PAGES*

## 2020-11-05 ENCOUNTER — OFFICE VISIT (OUTPATIENT)
Dept: OBGYN CLINIC | Age: 25
End: 2020-11-05
Payer: COMMERCIAL

## 2020-11-05 ENCOUNTER — HOSPITAL ENCOUNTER (OUTPATIENT)
Dept: PHYSICAL THERAPY | Facility: CLINIC | Age: 25
Setting detail: THERAPIES SERIES
Discharge: HOME | End: 2020-11-05
Payer: COMMERCIAL

## 2020-11-05 VITALS
TEMPERATURE: 98 F | HEIGHT: 62 IN | BODY MASS INDEX: 44.16 KG/M2 | DIASTOLIC BLOOD PRESSURE: 72 MMHG | SYSTOLIC BLOOD PRESSURE: 122 MMHG | WEIGHT: 240 LBS

## 2020-11-05 PROCEDURE — 97140 MANUAL THERAPY 1/> REGIONS: CPT

## 2020-11-05 PROCEDURE — 99213 OFFICE O/P EST LOW 20 MIN: CPT | Performed by: STUDENT IN AN ORGANIZED HEALTH CARE EDUCATION/TRAINING PROGRAM

## 2020-11-05 PROCEDURE — 97016 VASOPNEUMATIC DEVICE THERAPY: CPT

## 2020-11-05 PROCEDURE — 97110 THERAPEUTIC EXERCISES: CPT

## 2020-11-05 NOTE — PROGRESS NOTES
[facility]   [facility] [address]  [place] [facility]    Problem Visit      Roddy Ray  2020                       Primary Care Physician: ARIAN Villanueva CNP    CC:   Chief Complaint   Patient presents with    Other     Here for 2 month follow up U/S 20         HPI: Esme Vega is a 26 y.o. female  No LMP recorded. (Menstrual status: Irregular periods). The patient was seen and examined. She is here for test results and is complaining of irregular periods. The patient is still complaining of irregular periods. Sometimes, she feels as if she is having period cramps but has not had any bleeding. Discussed her ultrasound results which showed polycystic ovaries. Explained to the patient that her diagnosis is polycystic ovarian syndrome. Patient does not desire pregnancy in the near future and would like to discuss birth control options. Explained all forms of contraception to the patient. Patient is interested in the Mirena IUD due to her dysmenorrhea.       REVIEW OF SYSTEMS:  Constitutional: negative fever, negative chills  HEENT: negative visual disturbances, negative headaches  Respiratory: negative dyspnea, negative cough  Cardiovascular: negative chest pain,  negative palpitations  Gastrointestinal: negative abdominal pain, negative RUQ pain, negative N/V, negative diarrhea, negative constipation  Genitourinary: negative dysuria, negative vaginal discharge, positive irregular periods, positive dysmenorrhea  Dermatological: negative rash  Hematologic: negative bruising  Immunologic/Lymphatic: negative recent illness, negative recent sick contact  Musculoskeletal: negative back pain, negative myalgias, negative arthralgias  Neurological:  negative dizziness, negative weakness  Behavior/Psych: negative depression, negative anxiety    OBSTETRICAL HISTORY:  OB History    Para Term  AB Living   0 0 0 0 0 0   SAB TAB Ectopic Molar Multiple Live Births   0 0 0 0 0 0       PAST MEDICAL HISTORY:      Diagnosis Date    Allergic rhinitis     Anxiety     Asthma     Benign neoplasm of skin, site unspecified     Depression     PTSD (post-traumatic stress disorder)        PAST SURGICAL HISTORY:                                                                    Procedure Laterality Date    LAPAROSCOPY  12-31-14    with peritoneal washings    WISDOM TOOTH EXTRACTION         MEDICATIONS:  Current Outpatient Medications   Medication Sig Dispense Refill    escitalopram (LEXAPRO) 10 MG tablet Take 10 mg by mouth daily      prazosin (MINIPRESS) 1 MG capsule Take 1 mg by mouth nightly      omeprazole 20 MG EC tablet Take 1 tablet by mouth daily for 20 days Take on empty stomach 30 minutes before meals 20 tablet 0    diclofenac (VOLTAREN) 50 MG EC tablet Take 1 tablet by mouth 2 times daily for 14 days Take with food. 28 tablet 0    albuterol sulfate  (90 Base) MCG/ACT inhaler Inhale 2 puffs into the lungs every 6 hours as needed for Wheezing or Shortness of Breath 1 Inhaler 0    tiZANidine (ZANAFLEX) 2 MG tablet Take 1 tablet by mouth 3 times daily as needed (low back spasm) 30 tablet 0    hydrOXYzine (ATARAX) 25 MG tablet Take 1 tablet by mouth 3 times daily as needed       No current facility-administered medications for this visit. ALLERGIES:   Allergies as of 11/05/2020 - Review Complete 11/05/2020   Allergen Reaction Noted    Flonase [fluticasone] Hives 07/28/2017                                   VITALS:  Vitals:    11/05/20 1359   BP: 122/72   Site: Right Upper Arm   Position: Sitting   Cuff Size: Medium Adult   Temp: 98 °F (36.7 °C)   Weight: 240 lb (108.9 kg)   Height: 5' 2\" (1.575 m)                                                                                                                                                                           PHYSICAL EXAM:   General Appearance: Appears healthy.   Alert; in no

## 2020-11-10 ENCOUNTER — HOSPITAL ENCOUNTER (OUTPATIENT)
Dept: PHYSICAL THERAPY | Facility: CLINIC | Age: 25
Setting detail: THERAPIES SERIES
Discharge: HOME | End: 2020-11-10
Payer: COMMERCIAL

## 2020-11-10 NOTE — FLOWSHEET NOTE
[] [address] [facility]  [address]    [phone]  F: [phone]   [] [address]  [facility] [address]   [address]  P: [phone]  F: [phone]  [] [address]  [address]  P: [phone]  F: [phone] [] [address]  P: [phone]  F: [phone]  [] [address]  [address] [facility] [address]   [address]   [place]: [phone]  F: [phone]   [] [address]  [place]: [phone]   F: [phone]     Physical Therapy Cancel/No Show note    Date: 11/10/2020  Patient: Jim Lay  : 1995  MRN: [id number]    Cancels/No Shows to date:     For today's appointment patient:    [x]  Cancelled    [x] Rescheduled appointment    [] No-show     Reason given by patient:    []  Patient ill    []  Conflicting appointment    [] No transportation      [] Conflict with work    [] No reason given    [] Weather related    [] COVID-19    [] Other:      Comments: pt r/s      [] Next appointment was confirmed    Electronically signed by: Lesli Grimm

## 2020-11-11 ENCOUNTER — HOSPITAL ENCOUNTER (OUTPATIENT)
Dept: PHYSICAL THERAPY | Facility: CLINIC | Age: 25
Setting detail: THERAPIES SERIES
Discharge: HOME | End: 2020-11-11
Payer: COMMERCIAL

## 2020-11-11 PROCEDURE — 97016 VASOPNEUMATIC DEVICE THERAPY: CPT

## 2020-11-11 PROCEDURE — 97140 MANUAL THERAPY 1/> REGIONS: CPT

## 2020-11-11 PROCEDURE — 97110 THERAPEUTIC EXERCISES: CPT

## 2020-11-11 NOTE — FLOWSHEET NOTE
[] [facility] &  Therapy  [address]  [phone]  F: [phone] [] [address]  [facility]   [address]  P: [phone]  F: [phone] [] [address] &  Therapy  [address] [facility]  P: [phone]  F: [phone] [x] [address]  P: [phone]  F: [phone] [] [address]  [facility]   [address]   [place]: [phone]  F: [phone]      Physical Therapy Daily Treatment Note    Date:  2020  Patient Name:  Brenda Barreto    :  1995  MRN: [id number]  Physician: Dr. Fink English: José Miguel Cross (30 visits)  Medical Diagnosis: Achilles Tendinitis of E                     Rehab Codes: M76.62  Onset date: ~10/1/18             Next 's appt.: 20  Visit# / total visits: 3/20     Cancels/No Shows: 1/0    Subjective:    Pain:  [x] Yes  [] No Location: L Achilles  Pain Rating: (0-10 scale) 4/10  Pain altered Tx:  [x] No  [] Yes  Action:  Comments: Pt reports feeling \"alright\" this morning, walking at work increases her pain the most. Arrives in CAM boot.         Objective:  Exercises: CAM Boot for all standing exercises      Exercise  L Achilles Tendinitis     Reps/ Time Weight/ Level Comments               Seated BAPs          Seated MOBO  10xea    1/3, 2/4 rocks  x   *Foot Dome  10x3\"     x   *Toe Yoga  10x ea     x              *Long sitting gastroc stretch 3x30\"     x   *4 way ankle Tband  2x10 [place]    x              Other:* Given as HEP  Manual: (MFR-held today) Hypervolt to L gastroc, tightness noted on medial head      Vasocompression: 15' moderate compression L ankle supine with bolster     Specific Instructions for next treatment: Progress ankle stabilization as tolerated         Treatment Charges: Mins Units   []  Modalities     [x]  Ther Exercise 20 1   [x]  Manual Therapy 25 2   []  Ther Activities     []  Aquatics     [x]  Vasocompression 15 1   []  Other     Total Treatment time 60 4       Assessment: [x] Progressing toward goals. NuStep to start treatment followed by manual releases to gastroc/soleus junction. Decr tension noted. Light calcaneous joint mob w/ hold to stretch w/ relief reported. Cont w/ TB exercises and foot intrinsics. Ended treatment w/ vaso for symptom control. Pt reports relief at end of treatment. [] No change. [] Other:  [x] Patient would continue to benefit from skilled physical therapy services in order to decrease L gastroc tightness and increase L ankle strength and stability. STG/LTG  STG: (to be met in 10 treatments)  1. ? Pain: Decrease pain levels to   2. ? ROM: Increase flexibility and AROM limitations throughout to equal bilat to reduce difficulty with ADLs  3. ? Strength: Increase L ankle strength to 4+/5 globally  4. ? Function: Pt to progress ambulation out of CAM boot as tolerated and allowed per Dr. Salvador Hawley  5. Independent with Home Exercise Programs     LTG: (to be met in 20 treatments)  1. Improve score on assessment tool from 37.5% impaired to 15% impaired demonstrating an improvement in ADLs  2. Reduce pain levels to 0/10                    Patient goals: To have decreased L ankle pain  Pt. Education:  [] Yes  [] No  [] Reviewed Prior HEP/Ed  Method of Education: [] Verbal  [] Demo  [] Written  Comprehension of Education:  [] Verbalizes understanding. [] Demonstrates understanding. [] Needs review. [] Demonstrates/verbalizes HEP/Ed previously given. Plan: [x] Continue current frequency toward long and short term goals.     [] Specific Instructions for subsequent treatments:     Frequency:  2 x/week for 20 visits      Time In: 1100            Time Out: 0741    Electronically signed by:

## 2020-11-12 ENCOUNTER — APPOINTMENT (OUTPATIENT)
Dept: PHYSICAL THERAPY | Facility: CLINIC | Age: 25
End: 2020-11-12
Payer: COMMERCIAL

## 2020-11-13 ENCOUNTER — HOSPITAL ENCOUNTER (OUTPATIENT)
Dept: PHYSICAL THERAPY | Facility: CLINIC | Age: 25
Setting detail: THERAPIES SERIES
Discharge: HOME | End: 2020-11-13
Payer: COMMERCIAL

## 2020-11-13 PROCEDURE — 97016 VASOPNEUMATIC DEVICE THERAPY: CPT

## 2020-11-13 PROCEDURE — 97110 THERAPEUTIC EXERCISES: CPT

## 2020-11-13 PROCEDURE — 97140 MANUAL THERAPY 1/> REGIONS: CPT

## 2020-11-13 NOTE — FLOWSHEET NOTE
[] [facility]) - [facility] &  Therapy  [address]  [phone]  F: [phone] [] [address]  [facility]   [address]  P: [phone]  F: [phone] [] [address] &  Therapy  [address]  P: [phone]  F: [phone] [x] [address]  P: [phone]  F: [phone] [] [address]  [facility]   [address]   [place]: [phone]  F: [phone]      Physical Therapy Daily Treatment Note    Date:  2020  Patient Name:  Clarence Mathias    :  1995  MRN: [id number]  Physician: Dr. Simon Holloway: Tasha Jenkins (30 visits)  Medical Diagnosis: Achilles Tendinitis of E                     Rehab Codes: M76.62  Onset date: ~10/1/18             Next 's appt.: 20  Visit# / total visits:      Cancels/No Shows: 1/0    Subjective:    Pain:  [x] Yes  [] No Location: L Achilles  Pain Rating: (0-10 scale) 3/10  Pain altered Tx:  [x] No  [] Yes  Action:  Comments: Pt reports no sig changes regarding L ankle, mild pain persists pointing to insertion to calcaneus region.        Objective:  Exercises: CAM Boot for all standing exercises      Exercise  L Achilles Tendinitis     Reps/ Time Weight/ Level Comments               Seated BAPs  20xea    CW/CCW x   Seated MOBO  20xea    1/3, 2/4 rocks  x   *Foot Dome  10x5\"     x   *Toe Yoga  10x ea     x              *Long sitting gastroc stretch 3x30\"     x   *4 way ankle Tband  2x10 Lime   x              Other:* Given as HEP  Manual: MFR-held today, Hypervolt- fork attachment; to L gastroc, tightness noted globally     Vasocompression: 15' moderate compression L ankle supine with bolster     Specific Instructions for next treatment: Progress ankle stabilization as tolerated; resume Nustep next visit           Treatment Charges: Mins Units   []  Modalities     [x]  Ther Exercise 25 2   [x]  Manual Therapy 20 1   []  Ther Activities     []  Aquatics     [x]  Vasocompression 15 1   []  Other     Total Treatment time 60 4       Assessment: [x] Progressing toward goals. Initiated tx with manual, mod tenderness and tightness persists in L gastroc. PTA emphasized importance of cont with HEP as well as RICE with good understanding by pt. Pt also states prolonged standing at work tends to increase pain in L achilles attachment site. PTA also discussed pt making sure to take seated rest breaks while at work when able. Progressed to lime tband with 4 way ankle with good cheryl, mod muscle fatigue reported after. Improvement noted with toe yoga and foot doming, req mod cues for proper ankle neutral positioning. Vaso for pain/edema control. Ended with stair negotiation training with CAM boot d/t pt having some difficulties with stairs. [] No change. [] Other:  [x] Patient would continue to benefit from skilled physical therapy services in order to decrease L gastroc tightness and increase L ankle strength and stability. STG/LTG  STG: (to be met in 10 treatments)  1. ? Pain: Decrease pain levels to   2. ? ROM: Increase flexibility and AROM limitations throughout to equal bilat to reduce difficulty with ADLs  3. ? Strength: Increase L ankle strength to 4+/5 globally  4. ? Function: Pt to progress ambulation out of CAM boot as tolerated and allowed per Dr. Saul Conroy  5. Independent with Home Exercise Programs     LTG: (to be met in 20 treatments)  1. Improve score on assessment tool from 37.5% impaired to 15% impaired demonstrating an improvement in ADLs  2. Reduce pain levels to 0/10                    Patient goals: To have decreased L ankle pain  Pt.  Education:  [x] Yes  [] No  [] Reviewed Prior HEP/Ed  Method of Education: [x] Verbal  [] Demo  [] Written  Comprehension of Education:  [] Verbalizes understanding. [] Demonstrates understanding. [] Needs review. [x] Demonstrates/verbalizes HEP/Ed previously given. Plan: [x] Continue current frequency toward long and short term goals.     [] Specific Instructions for subsequent treatments:     Frequency:  2 x/week for 20 visits      Time In: 12:57pm            Time Out: 1:00pm    Electronically signed by:  Darlyn Coronado PTA

## 2020-11-16 ENCOUNTER — HOSPITAL ENCOUNTER (OUTPATIENT)
Dept: PHYSICAL THERAPY | Facility: CLINIC | Age: 25
Setting detail: THERAPIES SERIES
Discharge: HOME | End: 2020-11-16
Payer: COMMERCIAL

## 2020-11-16 PROCEDURE — 97110 THERAPEUTIC EXERCISES: CPT

## 2020-11-16 PROCEDURE — 97016 VASOPNEUMATIC DEVICE THERAPY: CPT

## 2020-11-16 PROCEDURE — 97140 MANUAL THERAPY 1/> REGIONS: CPT

## 2020-11-16 NOTE — FLOWSHEET NOTE
[] [address] - . [facility]EL[facility]STEP [facility] &  Therapy  [address]  [phone]  F: [phone] [] [address]  [facility]   [address]  P: [phone]  F: [phone] [] [address] &  Therapy  [address]  P: [phone]  F: [phone] [x] [address]  P: [phone]  F: [phone] [] [address]  [facility]   [address]   [place]: [phone]  F: [phone]      Physical Therapy Daily Treatment Note    Date:  2020  Patient Name:  Arabella Perkins    :  1995  MRN: [id number]  Physician: Dr. Gill Yepez: Jennifer Victoria (30 visits)  Medical Diagnosis: Achilles Tendinitis of [facility]                     Rehab Codes: M76.62  Onset date: ~10/1/18             Next 's appt.: 20  Visit# / total visits:      Cancels/No Shows: 1/0    Subjective:    Pain:  [x] Yes  [] No Location: L Achilles  Pain Rating: (0-10 scale) 5/10  Pain altered Tx:  [x] No  [] Yes  Action:  Comments: Pt arrives reporting she is in moderate pain d/t using poor body mechanics to bend down and plus her phone into an outlet.        Objective:  Exercises: CAM Boot for all standing exercises      Exercise  L Achilles Tendinitis     Reps/ Time Weight/ Level Comments    NuStep 5' Level 5  x              Seated BAPs  20xea Level 2  CW/CCW x   Seated MOBO  20xea    1/3, 2/4 rocks  x   *Foot Dome  10x5\"     x   *Toe Yoga  10x ea     x   Toe splay  10x   x                     *Long sitting gastroc stretch 3x30\"     x   Long sitting soleus stretch 3x30''   x   *4 way ankle Tband  2x10 Lime   x              Other:* Given as HEP  Manual: MFR   Hypervolt- fork attachment; to L gastroc, tightness noted globally     Vasocompression: 13' moderate compression L ankle supine with bolster     Specific Instructions for next treatment: Progress ankle stabilization as tolerated; resume Nustep next visit           Treatment Charges: Mins Units   []  Modalities     [x]  Ther Exercise 30 2   [x]  Manual Therapy 15 1   []  Ther Activities     []  Aquatics     [x]  Vasocompression 15 1   []  Other     Total Treatment time 60 4       Assessment: [x] Progressing toward goals. Initiated treatment w/ NuStep prior to [facility] in sitting position. Cont w/ foot intrisic exercises w/ added toe splay - no incr in symptoms noted. Cont w/ progression on ankle TB ex followed by manual as mentioned above. Focused on soleus this date d/t incr tension, educated patient on long sitting soleus stretch w/ good demonstration noted. Ended treatment w/ vaso for symptom control - no new symptoms reported. [] No change. [] Other:  [x] Patient would continue to benefit from skilled physical therapy services in order to decrease L gastroc tightness and increase L ankle strength and stability. STG/LTG  STG: (to be met in 10 treatments)  1. ? Pain: Decrease pain levels to   2. ? ROM: Increase flexibility and AROM limitations throughout to equal bilat to reduce difficulty with ADLs  3. ? Strength: Increase L ankle strength to 4+/5 globally  4. ? Function: Pt to progress ambulation out of CAM boot as tolerated and allowed per Dr. Grant Class  5. Independent with Home Exercise Programs     LTG: (to be met in 20 treatments)  1. Improve score on assessment tool from 37.5% impaired to 15% impaired demonstrating an improvement in ADLs  2. Reduce pain levels to 0/10                    Patient goals: To have decreased L ankle pain  Pt. Education:  [x] Yes  [] No  [] Reviewed Prior HEP/Ed  Method of Education: [x] Verbal  [] Demo  [] Written  Comprehension of Education:  [] Verbalizes understanding. [] Demonstrates understanding. [] Needs review.   [x] Demonstrates/verbalizes HEP/Ed previously given. Plan: [x] Continue current frequency toward long and short term goals.     [] Specific Instructions for subsequent treatments:     Frequency:  2 x/week for 20 visits      Time In: 0800            Time Out: 0900    Electronically signed by:  Aracely Roberts PTA

## 2020-11-17 ENCOUNTER — PROCEDURE VISIT (OUTPATIENT)
Dept: OBGYN CLINIC | Age: 25
End: 2020-11-17
Payer: COMMERCIAL

## 2020-11-17 ENCOUNTER — HOSPITAL ENCOUNTER (OUTPATIENT)
Age: 25
Setting detail: SPECIMEN
Discharge: HOME | End: 2020-11-17
Payer: COMMERCIAL

## 2020-11-17 VITALS
TEMPERATURE: 98.8 F | HEIGHT: 62 IN | DIASTOLIC BLOOD PRESSURE: 76 MMHG | SYSTOLIC BLOOD PRESSURE: 132 MMHG | BODY MASS INDEX: 44.83 KG/M2 | WEIGHT: 243.6 LBS

## 2020-11-17 PROCEDURE — 58300 INSERT INTRAUTERINE DEVICE: CPT | Performed by: STUDENT IN AN ORGANIZED HEALTH CARE EDUCATION/TRAINING PROGRAM

## 2020-11-17 NOTE — PROGRESS NOTES
without palpable masses. A sterile speculum was placed without incident. The cervix was then cleansed with betadine The Mirena IUD was opened and loaded into the delivery system. The wand was inserted just past the internal portio and the button was retracted to the first line. The wand was held in place for 10 seconds and then the button was retracted to its final position while the IUD was moved to the fundus, measuring 6cm. The string was trimmed in standard fashion and the Allis clamp was removed. The speculum was then removed. The patient tolerated the procedure without difficulty. Assessment & Plan:  Mona Schooling 26 y.o. female   Patient Active Problem List    Diagnosis Date Noted    Mirena IUD in place 20     Due out 25      PCOS (polycystic ovarian syndrome) 2020    Irregular menses 2020    Anxiety 09/15/2020    Severe depression ([address] 09/15/2020    Mild intermittent asthma without complication     Chronic post-traumatic stress disorder (PTSD) 2019    Seasonal allergies 2017    Pelvic pain in female 2014       Family Planning Counseling Completed  Barrier Recommendations reviewed  Removal of the Mirena IUD will be in 5 years on 25   Annual health follow-up  2021    Return in about 6 weeks (around 2020) for IUD String Check. The patient was counseled on follow up and home care with restrictions noted. Post procedure restrictions were reviewed and given to the patient. She was instructed to use barrier protection for sexually transmitted disease prevention as well as string checks/timing. She was instructed to abstain for two weeks. She is to notify the office or go to the nearest Emergency Department if she experiences Abdominal Pain, Temperatures more than 101 F, Odiferous Vaginal Discharge, Dizziness or Shortness of breath.     The patient was counseled on the need for string checks after her

## 2020-11-18 LAB
C TRACH DNA GENITAL QL NAA+PROBE: NEGATIVE
N. GONORRHOEAE DNA: NEGATIVE
SPECIMEN DESCRIPTION: NORMAL

## 2020-11-19 ENCOUNTER — HOSPITAL ENCOUNTER (OUTPATIENT)
Dept: PHYSICAL THERAPY | Facility: CLINIC | Age: 25
Setting detail: THERAPIES SERIES
Discharge: HOME | End: 2020-11-19
Payer: COMMERCIAL

## 2020-11-19 PROCEDURE — 97110 THERAPEUTIC EXERCISES: CPT

## 2020-11-19 PROCEDURE — 97140 MANUAL THERAPY 1/> REGIONS: CPT

## 2020-11-19 PROCEDURE — 97016 VASOPNEUMATIC DEVICE THERAPY: CPT

## 2020-11-19 NOTE — FLOWSHEET NOTE
[] [facility]) - [facility] &  Therapy  [address]  [phone]  F: [phone] [] [email or web address]  [facility] [address]   [address]  P: [phone]  F: [phone] [] [address] &  Therapy  [address]  P: [phone]  F: [phone] [x] [address]  P: [phone]  F: [phone] [] [address]  [facility]   [address]   [place]: [phone]  F: [phone]      Physical Therapy Daily Treatment Note    Date:  2020  Patient Name:  Jose Luis Covington    :  1995  MRN: [id number]  Physician: Dr. Lopez Abdirahman: Shirin Chavez (30 visits)  Medical Diagnosis: Achilles Tendinitis of E                     Rehab Codes: M76.62  Onset date: ~10/1/18             Next 's appt.: 20  Visit# / total visits:     Cancels/No Shows: 1/0    Subjective:    Pain:  [x] Yes  [] No Location: L Achilles  Pain Rating: (0-10 scale) 4/10  Pain altered Tx:  [x] No  [] Yes  Action:  Comments: Pt reports moving things at work yesterday with slight increased pain noted.     Objective:  Exercises: CAM Boot for all standing exercises      Exercise  L Achilles Tendinitis     Reps/ Time Weight/ Level Comments    NuStep 5' Level 5  x              Seated BAPs  20xea Level 2  CW/CCW x   Seated MOBO  20xea    1/3, 2/4 rocks  x   *Foot Dome  2x10, 5\"     x   *Toe Yoga  2x10     x   Toe splay  20x   x                     *Long sitting gastroc stretch 3x30\"     x   Long sitting soleus stretch 3x30''   x   *4 way ankle Tband  2x10 blue   x              Other:* Given as HEP  Manual: MFR   Hypervolt- dome attachment; to L gastroc, tightness noted globally     Vasocompression: 15' moderate compression L ankle supine with zionstfernando     Specific Instructions for next treatment: Progress ankle stabilization as tolerated; resume Nustep next visit           Treatment Charges: Mins Units   []  Modalities     [x]  Ther Exercise 30 2   [x]  Manual Therapy 15 1   []  Ther Activities     []  Aquatics     [x]  Vasocompression 15 1   []  Other     Total Treatment time 60 4       Assessment: [x] Progressing toward goals. Continued with warm up on Nustep without symptom aggravation. PTA then completed manual as listed above with increased tension noted in lateral structures, followed by stretches. Pt then able to complete 4 way tband with increased resistance with good cheryl. Pt also able to increase reps with intrinsic strengthening exercises with good cheryl. [] No change. [] Other:  [x] Patient would continue to benefit from skilled physical therapy services in order to decrease L gastroc tightness and increase L ankle strength and stability. STG/LTG  STG: (to be met in 10 treatments)  1. ? Pain: Decrease pain levels to   2. ? ROM: Increase flexibility and AROM limitations throughout to equal bilat to reduce difficulty with ADLs  3. ? Strength: Increase L ankle strength to 4+/5 globally  4. ? Function: Pt to progress ambulation out of CAM boot as tolerated and allowed per Dr. Richardson Tenorio  5. Independent with Home Exercise Programs     LTG: (to be met in 20 treatments)  1. Improve score on assessment tool from 37.5% impaired to 15% impaired demonstrating an improvement in ADLs  2. Reduce pain levels to 0/10                    Patient goals: To have decreased L ankle pain  Pt. Education:  [x] Yes  [] No  [] Reviewed Prior HEP/Ed  Method of Education: [x] Verbal  [] Demo  [] Written  Comprehension of Education:  [] Verbalizes understanding. [] Demonstrates understanding. [] Needs review. [x] Demonstrates/verbalizes HEP/Ed previously given. Plan: [x] Continue current frequency toward long and short term goals.     [] Specific Instructions for subsequent treatments:     Frequency:  2 x/week for 20 visits      Time In: 8:00am            Time Out: 9:10am    Electronically signed by:  Umm Ding PTA

## 2020-11-24 ENCOUNTER — HOSPITAL ENCOUNTER (OUTPATIENT)
Dept: PHYSICAL THERAPY | Facility: CLINIC | Age: 25
Setting detail: THERAPIES SERIES
Discharge: HOME | End: 2020-11-24
Payer: COMMERCIAL

## 2020-11-24 PROCEDURE — 97110 THERAPEUTIC EXERCISES: CPT

## 2020-11-24 PROCEDURE — 97124 MASSAGE THERAPY: CPT

## 2020-11-24 PROCEDURE — 97016 VASOPNEUMATIC DEVICE THERAPY: CPT

## 2020-11-24 PROCEDURE — 97140 MANUAL THERAPY 1/> REGIONS: CPT

## 2020-11-24 NOTE — FLOWSHEET NOTE
[] [facility] TWELVESTEP [facility] &  Therapy  [address]  [phone]  F: [phone] [] [address]  [facility] 36   [address]  P: [phone]  F: [phone] [] [address] &  Therapy  [address] [facility]  P: [phone]  F: [phone] [x] [address]  P: [phone]  F: [phone] [] [address]  [facility]   [address]   [place]: [phone]  F: [phone]      Physical Therapy Daily Treatment Note    Date:  2020  Patient Name:  Mick Brown    :  1995  MRN: [id number]  Physician: Dr. Ortega Reef: Deborah Burroughs (30 visits)  Medical Diagnosis: Achilles Tendinitis of E                     Rehab Codes: M76.62  Onset date: ~10/1/18             Next 's appt.: 20  Visit# / total visits:     Cancels/No Shows: 1/0    Subjective:    Pain:  [x] Yes  [] No Location: L Achilles  Pain Rating: (0-10 scale) 4/10  Pain altered Tx:  [x] No  [] Yes  Action:  Comments: Pt states L heel continues to be painful even with seated rest breaks at work.   Objective:  Exercises: CAM Boot for all standing exercises      Exercise  L Achilles Tendinitis     Reps/ Time Weight/ Level Comments    NuStep 5' Level 5  x          3 way hip  2x10  [place] x   Step ups 2x10 8\"  x              Seated BAPs  20xea Level 2  CW/CCW x   Seated MOBO  20xea    1/3, 2/4 rocks  x   *Foot Dome  10x, 10\"     x   *Toe Yoga  2x10     x   Toe splay  20x   x                     *Long sitting gastroc stretch 3x30\"     x   Long sitting soleus stretch 3x30''   x   *4 way ankle Tband  2x10 blue   x              Other:* Given as HEP  Manual: MFR   Hypervolt- dome attachment; to L gastroc, tightness noted globally     Vasocompression: 15' moderate compression L ankle supine with bolster     Specific Instructions for next treatment: Progress ankle stabilization as tolerated;           Treatment Charges: Mins Units   []  Modalities     [x]  Ther Exercise 30 2   [x]  Manual Therapy 15 1   []  Ther Activities     []  Aquatics     [x]  Vasocompression 15 1   []  Other     Total Treatment time 60 4       Assessment: [] Progressing toward goals. [] No change. [x] Other: Progressed with 3way hip with L LE in [place] as well as step ups leading with L LE on 8\" steps with good to. Pt admits to running down the stairs over the weekend without her CAM boot on to let her dog out with increased L achilles pain after. PTA emphasized importance of complying with keeping CAM boot and  RICE after work. PTA also advised pt to put 2nd heel lift in d/t continued L heel pain. Ended with vaso in supine with L LE elevated. Mild swelling noted over L post/lat ankle. [x] Patient would continue to benefit from skilled physical therapy services in order to decrease L gastroc tightness and increase L ankle strength and stability. STG/LTG  STG: (to be met in 10 treatments)  1. ? Pain: Decrease pain levels to   2. ? ROM: Increase flexibility and AROM limitations throughout to equal bilat to reduce difficulty with ADLs  3. ? Strength: Increase L ankle strength to 4+/5 globally  4. ? Function: Pt to progress ambulation out of CAM boot as tolerated and allowed per Dr. Satnam Franks  5. Independent with Home Exercise Programs     LTG: (to be met in 20 treatments)  1. Improve score on assessment tool from 37.5% impaired to 15% impaired demonstrating an improvement in ADLs  2. Reduce pain levels to 0/10                    Patient goals: To have decreased L ankle pain  Pt. Education:  [x] Yes  [] No  [] Reviewed Prior HEP/Ed  Method of Education: [x] Verbal  [] Demo  [] Written  Comprehension of Education:  [] Verbalizes understanding. [] Demonstrates understanding.   [] Needs review. [x] Demonstrates/verbalizes HEP/Ed previously given. Plan: [x] Continue current frequency toward long and short term goals.     [] Specific Instructions for subsequent treatments:     Frequency:  2 x/week for 20 visits      Time In: 8:00am            Time Out: 9:10am    Electronically signed by:  Alexandrea Velásquez PTA

## 2020-11-25 ENCOUNTER — HOSPITAL ENCOUNTER (OUTPATIENT)
Dept: PHYSICAL THERAPY | Facility: CLINIC | Age: 25
Setting detail: THERAPIES SERIES
Discharge: HOME | End: 2020-11-25
Payer: COMMERCIAL

## 2020-11-25 PROCEDURE — 97110 THERAPEUTIC EXERCISES: CPT

## 2020-11-25 PROCEDURE — 97140 MANUAL THERAPY 1/> REGIONS: CPT

## 2020-11-25 NOTE — FLOWSHEET NOTE
[] [facility]) [facility] &  Therapy  [address]  [phone]  F: [phone] [] [address]   [address]  P: [phone]  F: [phone] [] [address] &  Therapy  [address]  P: [phone]  F: [phone] [x] [address]  P: [phone]  F: [phone] [] [address]  [facility]   [address]   [place]: [phone]  F: [phone]      Physical Therapy Daily Treatment Note    Date:  2020  Patient Name:  Stephnaie Navarro    :  1995  MRN: [id number]  Physician: Dr. Melgar Resides: Jessi Waldron (30 visits)  Medical Diagnosis: Achilles Tendinitis of E                     Rehab Codes: M76.62  Onset date: ~10/1/18             Next 's appt.: 20  Visit# / total visits:     Cancels/No Shows: 1/0    Subjective:    Pain:  [x] Yes  [] No Location: L Achilles  Pain Rating: (0-10 scale) 4/10  Pain altered Tx:  [x] No  [] Yes  Action:  Comments: Pt states L heel continues to be painful even with seated rest breaks at work.   Objective:  Exercises: CAM Boot for all standing exercises       Exercise  L Achilles Tendinitis     Reps/ Time Weight/ Level Comments    NuStep 5' Level 5  x          3 way hip  2x10  [place] x   Step ups 2x10 8\", 6'' Fwd- 8'', lat- 6'' x              Seated BAPs  20xea Level 3  CW/CCW x   Seated MOBO  20xea    1/3, 2/4 rocks  x   *Foot Dome  10x, 10\"    x   *Toe Yoga  2x10     x   Toe splay  20x   x                     *Long sitting gastroc stretch 3x30\"   x   Long sitting soleus stretch 3x30''   x   *4 way ankle Tband  2x10 blue  x              Other:* Given as HEP  Manual: MFR   Hypervolt- dome attachment; to L gastroc, tightness noted globally     Vasocompression: 15' moderate compression L ankle supine with bolster - pt deferred this vs     Specific Instructions for next treatment: Progress ankle stabilization as tolerated;           Treatment Charges: Mins Units   []  Modalities     [x]  Ther Exercise 30 2   [x]  Manual Therapy 30 2   []  Ther Activities     []  Aquatics     []  Vasocompression     []  Other     Total Treatment time 60 4       Assessment: [] Progressing toward goals. [] No change. [x] Other: Added lateral step ups on 6'' step w/ cues to decr valgus at L knee during L lateral step up. Progressed level of BAPS w/ no incr pain reported. Manual DTM to gastroc/soleus w/ decr tension noted followed by stretches. Pt deferred vaso this vs.   [x] Patient would continue to benefit from skilled physical therapy services in order to decrease L gastroc tightness and increase L ankle strength and stability. STG/LTG  STG: (to be met in 10 treatments)  1. ? Pain: Decrease pain levels to   2. ? ROM: Increase flexibility and AROM limitations throughout to equal bilat to reduce difficulty with ADLs  3. ? Strength: Increase L ankle strength to 4+/5 globally  4. ? Function: Pt to progress ambulation out of CAM boot as tolerated and allowed per Dr. Nicole Collado  5. Independent with Home Exercise Programs     LTG: (to be met in 20 treatments)  1. Improve score on assessment tool from 37.5% impaired to 15% impaired demonstrating an improvement in ADLs  2. Reduce pain levels to 0/10                    Patient goals: To have decreased L ankle pain  Pt. Education:  [x] Yes  [] No  [] Reviewed Prior HEP/Ed  Method of Education: [x] Verbal  [] Demo  [] Written  Comprehension of Education:  [] Verbalizes understanding. [] Demonstrates understanding. [] Needs review. [x] Demonstrates/verbalizes HEP/Ed previously given. Plan: [x] Continue current frequency toward long and short term goals.     [] Specific Instructions for subsequent treatments: Frequency:  2 x/week for 20 visits      Time In: 6697            Time Out: 2866    Electronically signed by:  Trell Caldwell PTA

## 2020-12-01 ENCOUNTER — HOSPITAL ENCOUNTER (OUTPATIENT)
Dept: PHYSICAL THERAPY | Facility: CLINIC | Age: 25
Setting detail: THERAPIES SERIES
Discharge: HOME | End: 2020-12-01
Payer: COMMERCIAL

## 2020-12-01 PROCEDURE — 97016 VASOPNEUMATIC DEVICE THERAPY: CPT

## 2020-12-01 PROCEDURE — 97110 THERAPEUTIC EXERCISES: CPT

## 2020-12-01 PROCEDURE — 97140 MANUAL THERAPY 1/> REGIONS: CPT

## 2020-12-03 ENCOUNTER — HOSPITAL ENCOUNTER (OUTPATIENT)
Dept: PHYSICAL THERAPY | Facility: CLINIC | Age: 25
Setting detail: THERAPIES SERIES
Discharge: HOME | End: 2020-12-03
Payer: COMMERCIAL

## 2020-12-03 PROCEDURE — 97016 VASOPNEUMATIC DEVICE THERAPY: CPT

## 2020-12-03 PROCEDURE — 97110 THERAPEUTIC EXERCISES: CPT

## 2020-12-03 PROCEDURE — 97140 MANUAL THERAPY 1/> REGIONS: CPT

## 2020-12-03 NOTE — FLOWSHEET NOTE
[] [facility]) - [facility] &  Therapy  [address]  [phone]  F: [phone] [] [address]   [address]  P: [phone]  F: [phone] [] [address] &  Therapy  [address]  P: [phone]  F: [phone] [x] [address]  P: [phone]  F: [phone] [] [address]  [facility]   [address]   [place]: [phone]  F: [phone]      Physical Therapy Daily Treatment Note    Date:  12/3/2020  Patient Name:  Sanders Goldmann    :  1995  MRN: [id number]  Physician: Dr. Luz Marina Raman: Addy Elliott (30 visits)  Medical Diagnosis: Achilles Tendinitis of E                     Rehab Codes: M76.62  Onset date: ~10/1/18             Next 's appt.: 20  Visit# / total visits: 10/20    Cancels/No Shows: 1/0    Subjective:    Pain:  [x] Yes  [] No Location: L Achilles  Pain Rating: (0-10 scale) 2/10  Pain altered Tx:  [x] No  [] Yes  Action:  Comments: Pt arrived with decreased pain, even after working all night. Pt states she is noticing an improvement.       Objective:  Exercises: CAM Boot for all standing exercises       Exercise  L Achilles Tendinitis     Reps/ Time Weight/ Level Comments    NuStep 5' Level 5  x          3 way hip  2x10 orange OKC x   Step ups 20x 8\", 6'' Fwd- 8'', lat- 6'' x              Seated BAPs  20xea Level 3  CW/CCW -   Seated MOBO  20xea    1/3, 2/4 rocks  -   Seated heel/toe raises  2x10  Towel under heel  x   *Foot Dome  10x, 10\"    x   *Toe Yoga  20x     x   Toe splay  20x   -                     *Long sitting gastroc stretch 3x30\"   x   Long sitting soleus stretch 3x30''   x   *4 way ankle Tband  2x15 blue  x   Clamshells  2x10    bilat  x SL hip ABD  10x  bilat  x   Other:* Given as HEP  Manual: MFR and Hypervolt- ball attachment; to L gastroc, tightness noted globally     Vasocompression: 15' moderate compression L ankle supine with bolster      Specific Instructions for next treatment: Progress ankle stabilization as tolerated;           Treatment Charges: Mins Units   []  Modalities     [x]  Ther Exercise 40 3   [x]  Manual Therapy 10 1   []  Ther Activities     []  Aquatics     [x]  Vasocompression 15 1   []  Other     Total Treatment time 60 4       Assessment: [x] Progressing toward goals. Completed all exercises this date without any increase in pain. Pt with noted improvement of hallus longus activation during [employer] board. Ended with vaso this date per patient preference. [] No change. [] Other:  [x] Patient would continue to benefit from skilled physical therapy services in order to decrease L gastroc tightness and increase L ankle strength and stability. STG/LTG  STG: (to be met in 10 treatments)  1. ? Pain: Decrease pain levels to   2. ? ROM: Increase flexibility and AROM limitations throughout to equal bilat to reduce difficulty with ADLs  3. ? Strength: Increase L ankle strength to 4+/5 globally  4. ? Function: Pt to progress ambulation out of CAM boot as tolerated and allowed per Dr. Arianna Pabon  5. Independent with Home Exercise Programs     LTG: (to be met in 20 treatments)  1. Improve score on assessment tool from 37.5% impaired to 15% impaired demonstrating an improvement in ADLs  2. Reduce pain levels to 0/10                    Patient goals: To have decreased L ankle pain  Pt. Education:  [x] Yes  [] No  [] Reviewed Prior HEP/Ed  Method of Education: [x] Verbal  [] Demo  [] Written  Comprehension of Education:  [] Verbalizes understanding. [] Demonstrates understanding. [] Needs review. [x] Demonstrates/verbalizes HEP/Ed previously given. Plan: [x] Continue current frequency toward long and short term goals. [] Specific Instructions for subsequent treatments:     Frequency:  2 x/week for 20 visits      Time In: 900            Time Out: 1392    Electronically signed by:  Jenni Cosme PT

## 2020-12-08 ENCOUNTER — HOSPITAL ENCOUNTER (OUTPATIENT)
Dept: PHYSICAL THERAPY | Facility: CLINIC | Age: 25
Setting detail: THERAPIES SERIES
Discharge: HOME | End: 2020-12-08
Payer: COMMERCIAL

## 2020-12-08 PROCEDURE — 97110 THERAPEUTIC EXERCISES: CPT

## 2020-12-08 PROCEDURE — 97016 VASOPNEUMATIC DEVICE THERAPY: CPT

## 2020-12-08 PROCEDURE — 97140 MANUAL THERAPY 1/> REGIONS: CPT

## 2020-12-08 NOTE — FLOWSHEET NOTE
[] [facility]) - [facility] &  Therapy  [address]  [phone]  F: [phone] [] [address]  [address]   [address]  P: [phone]  F: [phone] [] [address] &  Therapy  [address] [facility]  P: [phone]  F: [phone] [x] [address]  P: [phone]  F: [phone] [] [address]  [facility]   [address]   [place]: [phone]  F: [phone]      Physical Therapy Daily Treatment Note    Date:  2020  Patient Name:  Izabela Huggins    :  1995  MRN: [id number]  Physician: Dr. Hernandez Furry: Ishmael Lazaro (30 visits)  Medical Diagnosis: Achilles Tendinitis of LLE                     Rehab Codes: M76.62  Onset date: ~10/1/18             Next 's appt.: 20  Visit# / total visits:     Cancels/No Shows: 1/0    Subjective:    Pain:  [x] Yes  [] No Location: L Achilles  Pain Rating: (0-10 scale) -/10  Pain altered Tx:  [x] No  [] Yes  Action: N/A  Comments: Denies L achilles symptoms at present. Reports B quad, hip soreness post last visit only. Reports pain ranges from 2-6/10 over this past week depending upon each work shift [while donning L LE CAM BOOT]. Reports plans to follow-up with referring MD on Friday.       Objective:  Exercises: L LE CAM Boot for all standing exercises       Exercise  L Achilles Tendinitis     Reps/ Time Weight/ Level Comments    Goal update, LEFS, PT POC education    COMPLETED- 2020  x   NuStep x6' Level 8 B LEs only  x          3 way hip  x20 ea [place]- crossed  OKC  Light B UE A x   Step ups- B 20x ea 8\", 6'' Fwd- 8'', lat- 6''  Cues for neutral hip rotation during lat x              Seated BAPs 20xea Level 3 CW/CCW -   Seated MOBO 20xea   1/3, 2/4 rocks  -   Seated heel/toe raises  x20 ea  Towel under heel     \"Burning\" with toe raises within L achilles- instructed to cease  x   *Foot Dome x20, 5\" ea    x   *Toe Yoga 20x     x   Toe splay 20x   x                     *Long sitting gastroc stretch 3x30\" ea  Post MT x   Long sitting soleus stretch 3x30'' ea  Post MT x   *4 way ankle Tband  2x15 blue  -   Clamshells  x20 ea   bilat  x   SL hip ABD  x10 ea   bilat  x   Other:* Given as HEP    See below for goal update. LEFS is 56/80- 30% impaired     Manual: MFR and Hypervolt- ball attachment; to L gastroc, tightness noted globally; IASTM with scanning tool and manual TPR [prox medial, distal lateral] this date.       Vasocompression: 10' low compression L ankle supine with bolster and towel roll      Specific Instructions for next treatment: Follow-up with pt regarding visit with referring MD- adjust PT POC and treatment pending instructions regarding L LE CAM boot. Treatment Charges: Mins Units   []  Modalities     [x]  Ther Exercise 43 3   [x]  Manual Therapy 10 1   []  Ther Activities     []  Aquatics     [x]  Vasocompression 10 1   []  Other     Total Treatment time 63  5     [x6' on nu-step]    Assessment: [x] Progressing toward goals. Pt presents without symptoms, and reports improvements with PT services thus far. Also plans to visit referring MD on Friday. Therefore, performed goal update and re-assessed LEFS this date for PN to referring MD. Pt demos progress toward the majority of therapy goals, however, continued calf complex inflexibility- most likely secondary to prolonged duration within L LE CAM boot. Pt expresses desire to wean from L LE CAM boot, however, pain ranges from 2-6/10 over this past week- depending upon work shift. Therefore, will wait to progress pending follow-up with referring MD.     [] No change.      [] Other:  [x] Patient would continue to benefit from skilled physical therapy services in order to decrease L gastroc tightness and increase L ankle strength and stability. STG: (to be met in 10 treatments)  1. ? Pain: Decrease pain levels to- UNCLEAR- pain 2-6/10 over this past week   2. ? ROM: Increase flexibility and AROM limitations throughout to equal bilat to reduce difficulty with ADLs- PROGRESSING- 10 deg DF AROM- knee flex; 5 deg DF AROM- knee ext- both with 4/10 P    3. ? Strength: Increase L ankle strength to 4+/5 globally- PROGRESSING- 4+/5, 4/10 pain with all against PT OP in long-sitting; standing gastroc MMT not assessed this date   4. ? Function: Pt to progress ambulation out of CAM boot as tolerated and allowed per Dr. Richard Duran Friday   5. Independent with Home Exercise Programs- MET     LTG: (to be met in 20 treatments)  1. Improve score on assessment tool from 37.5% impaired to 15% impaired demonstrating an improvement in ADLs- PROGRESSING- 30% impaired   2. Reduce pain levels to 0/10- NOT MET     Patient goals: To have decreased L ankle pain- PROGRESSING     Pt. Education:  [] Yes  [] No  [] Reviewed Prior HEP/Ed  Method of Education: [] Verbal  [] Demo  [] Written  Comprehension of Education:  [] Verbalizes understanding. [] Demonstrates understanding. [] Needs review. [] Demonstrates/verbalizes HEP/Ed previously given. Plan: [x] Continue current frequency toward long and short term goals. [x] Specific Instructions for subsequent treatments: Follow-up with pt regarding visit with referring MD- adjust PT POC and treatment pending instructions regarding L LE CAM boot.      Frequency:  2 x/week for 20 visits      Time In: 8AM            Time Out: 9:10AM    Electronically signed by:  Kenrick Bustos PT

## 2020-12-08 NOTE — PROGRESS NOTES
boot. Pt expresses desire to wean from L LE CAM boot, however, pain ranges from 2-6/10 over this past week- depending upon work shift. Pt with intermittent improvement in pain that is reflective of days worked; pain is worse when working multiple shifts but improved when having a day off. Therefore, will wait to progress pending follow-up with referring MD.                            []? No change. []? Other:  [x]? Patient would continue to benefit from skilled physical therapy services in order to decrease L gastroc tightness and increase L ankle strength and stability.      STG: (to be met in 10 treatments)  1. ? Pain: Decrease pain levels to- UNCLEAR- pain 2-6/10 over this past week   2. ? ROM: Increase flexibility and AROM limitations throughout to equal bilat to reduce difficulty with ADLs- PROGRESSING- 10 deg DF AROM- knee flex; 5 deg DF AROM- knee ext- both with 4/10 P    3. ? Strength: Increase L ankle strength to 4+/5 globally- PROGRESSING- 4+/5, 4/10 pain with all against PT OP; standing gastroc not assessed this date   4. ? Function: Pt to progress ambulation out of CAM boot as tolerated and allowed per Dr. Theresa Pate Friday   5. Independent with Home Exercise Programs- MET     LTG: (to be met in 20 treatments)  1.  Improve score on assessment tool from 37.5% impaired to 15% impaired demonstrating an improvement in ADLs- PROGRESSING- 30% impaired   2. Reduce pain levels to 0/10- NOT MET     Patient goals: To have decreased L ankle pain- PROGRESSING     Treatment Plan:  [x] Therapeutic Exercise   06330  [] Iontophoresis: 4 mg/mL Dexamethasone Sodium Phosphate  mAmin  40833   [] Therapeutic Activity  60590 [x] Vasopneumatic cold with compression  67040    [] Gait Training   12462 [] Ultrasound   [id number]   [] Neuromuscular Re-education  49940 [] Electrical Stimulation Unattended  27255   [x] Manual Therapy  24717 [] Electrical Stimulation Attended  19348   [x] Instruction in HEP  [] Lumbar/Cervical Traction  [id number]   [] Aquatic Therapy   [id number] [] Cold/hotpack    [] Massage   [id number]      [] Dry Needling, 1 or 2 muscles  14978   [] Biofeedback, first 15 minutes   35164  [] Biofeedback, additional 15 minutes   86205 [] Dry Needling, 3 or more muscles  94799       Patient Status:     [x] Continue per initial plan of care. [] Additional visits necessary. [] Other:             Electronically signed by Loretta Duvall PT on 12/8/2020 at 9:48 AM      If you have any questions or concerns, please don't hesitate to call. Thank you for your referral.    Physician Signature:________________________________Date:__________________  By signing above or cosigning this note, I have reviewed this plan of care and certify a need for medically necessary rehabilitation services.      *PLEASE SIGN ABOVE AND FAX BACK ALL PAGES*

## 2020-12-11 ENCOUNTER — HOSPITAL ENCOUNTER (OUTPATIENT)
Dept: PHYSICAL THERAPY | Facility: CLINIC | Age: 25
Setting detail: THERAPIES SERIES
Discharge: HOME | End: 2020-12-11
Payer: COMMERCIAL

## 2020-12-11 ENCOUNTER — OFFICE VISIT (OUTPATIENT)
Dept: ORTHOPEDIC SURGERY | Age: 25
End: 2020-12-11
Payer: COMMERCIAL

## 2020-12-11 VITALS — RESPIRATION RATE: 12 BRPM | WEIGHT: 243 LBS | BODY MASS INDEX: 44.72 KG/M2 | HEIGHT: 62 IN | TEMPERATURE: 97.4 F

## 2020-12-11 PROCEDURE — 99214 OFFICE O/P EST MOD 30 MIN: CPT | Performed by: ORTHOPAEDIC SURGERY

## 2020-12-11 PROCEDURE — G8417 CALC BMI ABV UP PARAM F/U: HCPCS | Performed by: ORTHOPAEDIC SURGERY

## 2020-12-11 PROCEDURE — G8484 FLU IMMUNIZE NO ADMIN: HCPCS | Performed by: ORTHOPAEDIC SURGERY

## 2020-12-11 PROCEDURE — 97110 THERAPEUTIC EXERCISES: CPT

## 2020-12-11 PROCEDURE — 1036F TOBACCO NON-USER: CPT | Performed by: ORTHOPAEDIC SURGERY

## 2020-12-11 PROCEDURE — 97140 MANUAL THERAPY 1/> REGIONS: CPT

## 2020-12-11 PROCEDURE — G8427 DOCREV CUR MEDS BY ELIG CLIN: HCPCS | Performed by: ORTHOPAEDIC SURGERY

## 2020-12-11 RX ORDER — NAPROXEN 500 MG/1
500 TABLET ORAL 2 TIMES DAILY PRN
Qty: 60 TABLET | Refills: 0 | Status: SHIPPED | OUTPATIENT
Start: 2020-12-11 | End: 2020-12-14 | Stop reason: ALTCHOICE

## 2020-12-11 NOTE — FLOWSHEET NOTE
[] [facility]) [facility] &  Therapy  [address]  [phone]  F: [phone] [] [address]  [address]   [address]  P: [phone]  F: [phone] [] [address]  Therapy  [address]  P: [phone]  F: [phone] [x] [address]  P: [phone]  F: [phone] [] [address]  [address]: [phone]  F: [phone]      Physical Therapy Daily Treatment Note    Date:  2020  Patient Name:  C[place] Vpon    :  1995  MRN: [id number]  Physician: Dr. Hoffmann Forget: Matilda Balderas (30 visits)  Medical Diagnosis: Achilles Tendinitis of LLE                     Rehab Codes: M76.62  Onset date: ~10/1/18             Next 's appt.: 20  Visit# / total visits:     Cancels/No Shows: 1/0    Subjective:    Pain:  [x] Yes  [] No Location: L Achilles  Pain Rating: (0-10 scale) 0/10  Pain altered Tx:  [x] No  [] Yes  Action: N/A  Comments: Pt reports seeing Dr. Alexa Rosas with positive feedback and with plans to wean out of the boot starting next week on 20.      Objective:  Exercises: L LE CAM Boot for all standing exercises       Exercise  L Achilles Tendinitis     Reps/ Time Weight/ Level Comments    NuStep x6' Level 8 B LEs only  x          3 way hip  x20 ea [place]- crossed  OKC  Light B UE A x   Step ups- B 20x ea 12\", 6'' Fwd- 8'', lat- 6''  Cues for neutral hip rotation during lat x              Seated BAPs 20xea Level 3 CW/CCW -   Seated MOBO 20xea   1/3, 2/4 rocks  -   Seated heel/toe raises  3x10 ea  Towel under heel     Able to resume toe raises with modifications to decr ROM x   *Foot Dome x20, 5\" ea    x   *Toe Yoga 20x     x   Toe splay 20x x                     *Long sitting gastroc stretch 3x30\" ea  Post MT x   Long sitting soleus stretch 3x30'' ea  Post MT x   *4 way ankle Tband  2x15 blue  -   Clamshells  2x10 ea  orange  bilat  x   SL hip ABD  2x10 ea   bilat  x   Other:* Given as HEP    See below for goal update. LEFS is 56/80- 30% impaired     Manual: MFR and Hypervolt- fork attachment; to L gastroc     Vasocompression: 10' low compression L ankle supine with bolster and towel roll- held today      Specific Instructions for next treatment: Begin weaning out of boot next visit per Dr. Tamra Sandoval protocol      Treatment Charges: Mins Units   []  Modalities     [x]  Ther Exercise 4 3   [x]  Manual Therapy 10 1   []  Ther Activities     []  Aquatics     []  Vasocompression     []  Other     Total Treatment time 55 4       Assessment: [x] Progressing toward goals. Increased step ups to 12\" in CAM boot with good cheryl. Also added orange tband with clamshells for cont hip strengthening with appropriate musculature fatigue/burning reported after. Darletta Bras to resume seated toe raises; modified positioning to decrease RO, with good cheryl and no c/o pain or problems. Good cheryl to tx today, pt requests to hold vaso d/t no c/o pain. [] No change. [] Other:  [x] Patient would continue to benefit from skilled physical therapy services in order to decrease L gastroc tightness and increase L ankle strength and stability. STG: (to be met in 10 treatments)  1. ? Pain: Decrease pain levels to- UNCLEAR- pain 2-6/10 over this past week   2. ? ROM: Increase flexibility and AROM limitations throughout to equal bilat to reduce difficulty with ADLs- PROGRESSING- 10 deg DF AROM- knee flex; 5 deg DF AROM- knee ext- both with 4/10 P    3. ? Strength:  Increase L ankle strength to 4+/5 globally- PROGRESSING- 4+/5, 4/10 pain with all against PT OP in long-sitting; standing gastroc MMT not assessed this date   4. ? Function: Pt to progress ambulation out of CAM boot as

## 2020-12-11 NOTE — PROGRESS NOTES
[facility]  [facility] Shagufta Dewitt  [address] [address]  Dept: [phone]    Ambulatory Orthopedic Consult      CHIEF COMPLAINT:    Chief Complaint   Patient presents with    Ankle Pain     Left Ankle       HISTORY OF PRESENT ILLNESS:      The patient is a 26 y.o. female who is being seen for consultation and evaluation of pain at the posterior aspect of the left Achilles tendon/heel, which began atraumatically in 2018. The pain is described mainly with mechanical terms (dull/sharp/throbbing). The pain is worse with activity and better with rest. The patient reports a progressive course. The patient has tried:      [x]  rest/activity modification          [x]  NSAIDs      []  opiates      []  orthotics        []  change in shoes   []  home exercises  []  physical therapy      []  CAM boot     []  brace:    []  injection:       []  surgery:      INTERVAL HISTORY 12/11/2020:  She is seen again today in the office for follow up of a previous issue (as above). Since being seen last, the patient is doing better. She is ambulating today using a CAM boot. The location and quality of the pain have not significantly changed since the last visit. REVIEW OF SYSTEMS:  Constitutional: Negative for fever. HENT: Negative for tinnitus. Eyes: Negative for pain. Respiratory: Negative for shortness of breath. Cardiovascular: Negative for chest pain. Gastrointestinal: Negative for abdominal pain. Genitourinary: Negative for dysuria. Skin: Negative for rash. Neurological: Negative for headaches. Hematological: Does not bruise/bleed easily. Musculoskeletal: See HPI for pertinent positives     Past Medical History:    She  has a past medical history of Allergic rhinitis, Anxiety, Asthma, Benign neoplasm of skin, site unspecified, Depression, and PTSD (post-traumatic stress disorder).      Past Surgical History:    She  has a past surgical history that includes laparoscopy (12-31-14); [place] tooth extraction; and intrauterine device insertion (11/17/2020). Current Medications:     Current Outpatient Medications:     escitalopram (LEXAPRO) 10 MG tablet, Take 10 mg by mouth daily, Disp: , Rfl:     prazosin (MINIPRESS) 1 MG capsule, Take 1 mg by mouth nightly, Disp: , Rfl:     omeprazole 20 MG EC tablet, Take 1 tablet by mouth daily for 20 days Take on empty stomach 30 minutes before meals, Disp: 20 tablet, Rfl: 0    diclofenac (VOLTAREN) 50 MG EC tablet, Take 1 tablet by mouth 2 times daily for 14 days Take with food. , Disp: 28 tablet, Rfl: 0    albuterol sulfate  (90 Base) MCG/ACT inhaler, Inhale 2 puffs into the lungs every 6 hours as needed for Wheezing or Shortness of Breath, Disp: 1 Inhaler, Rfl: 0    tiZANidine (ZANAFLEX) 2 MG tablet, Take 1 tablet by mouth 3 times daily as needed (low back spasm), Disp: 30 tablet, Rfl: 0    hydrOXYzine (ATARAX) 25 MG tablet, Take 1 tablet by mouth 3 times daily as needed, Disp: , Rfl:      Allergies:    Flonase [fluticasone]    Family History:  family history includes Diabetes in her maternal grandfather, maternal grandmother, paternal grandfather, and paternal grandmother; Heart Attack in her maternal aunt and paternal grandmother; High Blood Pressure in her mother; Kidney Disease in her maternal aunt; No Known Problems in her father; Ovarian Cancer in her maternal grandmother; Stroke in her paternal grandmother.     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 FINDINGS:  Three weightbearing views (AP, Mortise, and Lateral) of the left ankle and three weightbearing views (AP, Oblique, Lateral) of the left foot were obtained in the office today and reviewed, revealing no acute fracture, dislocation, or radioopaque foreign body/tumor. The ankle mortise is maintained with no widening of the clear spaces. Overall alignment is satisfactory. IMPRESSION:  No acute fracture/dislocation. Electronically signed by Shashank Guerrero MD      ASSESSMENT AND PLAN:  Albert Oleary was seen today for Ankle Pain (Left Ankle)  The encounter diagnosis was Achilles tendinitis of left lower extremity. Body mass index is 44.45 kg/m². She has left insertional Achilles tendinitis; she is doing very well with conservative management. Notably, she has the past medical history as above, including but not limited to the following:  Asthma, anxiety/PTSD/depression. I had another discussion today with the patient about the likely diagnosis and its natural history, physical exam and imaging findings, as well as treatment options in detail. Surgically, we discussed a possible future debridement versus reconstruction of the Achilles tendon, with bone resection, and possible tendon transfer as needed, should the patient not respond significantly to conservative management. At this point, as she is doing well with conservative management, we decided to continue with conservative management. Orders/referrals were placed as below at today's visit. We discussed that she will remove the heel lift from the cam boot, as she is having no pain with ambulating, and she will begin to weight-bear as tolerated in the cam boot without a heel lift. She was also provided 2 heel lifts for her shoe today, and she will work with physical therapy to wean out of the cam boot into shoe with heel lifts, and then gradually into a flat shoe, avoiding pain foot activity.   Her sessions with physical therapy were renewed. We again discussed the risk of rupture. She was provided a prescription for NSAIDs to use as needed, and we have discussed the appropriate risks. All questions were answered and the patient agrees with the above plan. The patient will return to clinic in 3 months as needed without x-rays. Return in about 3 months (around 3/11/2021), or if symptoms worsen or fail to improve. No orders of the defined types were placed in this encounter. No orders of the defined types were placed in this encounter. Reginald Hodgkin, MD  Orthopedic Surgery, Foot and Ankle        Please excuse any typos/errors, as this note was created with the assistance of voice recognition software. While intending to generate a document that actually reflects the content of the visit, the document can still have some errors including those of syntax and sound-a-like substitutions which may escape proof reading. In such instances, actual meaning can be extrapolated by context.

## 2020-12-14 ENCOUNTER — OFFICE VISIT (OUTPATIENT)
Dept: FAMILY MEDICINE CLINIC | Age: 25
End: 2020-12-14
Payer: COMMERCIAL

## 2020-12-14 ENCOUNTER — HOSPITAL ENCOUNTER (OUTPATIENT)
Dept: PHYSICAL THERAPY | Facility: CLINIC | Age: 25
Setting detail: THERAPIES SERIES
Discharge: HOME | End: 2020-12-14
Payer: COMMERCIAL

## 2020-12-14 VITALS
TEMPERATURE: 97.3 F | DIASTOLIC BLOOD PRESSURE: 70 MMHG | SYSTOLIC BLOOD PRESSURE: 120 MMHG | BODY MASS INDEX: 43.9 KG/M2 | HEART RATE: 86 BPM | OXYGEN SATURATION: 96 % | WEIGHT: 240 LBS

## 2020-12-14 PROCEDURE — G8427 DOCREV CUR MEDS BY ELIG CLIN: HCPCS | Performed by: NURSE PRACTITIONER

## 2020-12-14 PROCEDURE — G8417 CALC BMI ABV UP PARAM F/U: HCPCS | Performed by: NURSE PRACTITIONER

## 2020-12-14 PROCEDURE — 97110 THERAPEUTIC EXERCISES: CPT

## 2020-12-14 PROCEDURE — 97016 VASOPNEUMATIC DEVICE THERAPY: CPT

## 2020-12-14 PROCEDURE — 99213 OFFICE O/P EST LOW 20 MIN: CPT | Performed by: NURSE PRACTITIONER

## 2020-12-14 PROCEDURE — 1036F TOBACCO NON-USER: CPT | Performed by: NURSE PRACTITIONER

## 2020-12-14 PROCEDURE — 90732 PPSV23 VACC 2 YRS+ SUBQ/IM: CPT | Performed by: NURSE PRACTITIONER

## 2020-12-14 PROCEDURE — 90471 IMMUNIZATION ADMIN: CPT | Performed by: NURSE PRACTITIONER

## 2020-12-14 PROCEDURE — G8484 FLU IMMUNIZE NO ADMIN: HCPCS | Performed by: NURSE PRACTITIONER

## 2020-12-14 NOTE — PROGRESS NOTES
[facility]  [id number] [facility] [place]. Jose David Gino  [facility], [address]  [phone]  [phone]    Oscar Rausch is a 26 y.o. female who is here with c/o of:    Chief Complaint: Back Pain (pain has gotten worse)      Patient Accompanied by: n/a    HPI - Oscar Rausch is here today with c/o:    Patient here with complaints of ongoing back pain since September. She says she felt as if she pulled something at work. She reports some days it is sharp pain and burning sensation. Most days her back just aches. Denies radiation down legs. Reports that nothing makes it better. She has tried Tylenol, Advil, heating, icing, ice hot, muscle relaxers. Reports that everyday tasks cause her pain; bending, turning, lifting.        Health Maintenance Due   Topic Date Due    Pneumococcal 0-64 years Vaccine (1 of 1 - PPSV23) 08/31/2002        Patient Active Problem List:     Pelvic pain in female     Seasonal allergies     Chronic post-traumatic stress disorder (PTSD)     Anxiety     Severe depression ([facility] [address]     Mild intermittent asthma without complication     Irregular menses     PCOS (polycystic ovarian syndrome)     Mirena IUD in place 11/17/20     Past Medical History:   Diagnosis Date    Allergic rhinitis     Anxiety     Asthma     Benign neoplasm of skin, site unspecified     Depression     PTSD (post-traumatic stress disorder)       Past Surgical History:   Procedure Laterality Date    INTRAUTERINE DEVICE INSERTION  11/17/2020    Mirena    LAPAROSCOPY  12-31-14    with peritoneal washings    WISDOM TOOTH EXTRACTION       Family History   Problem Relation Age of Onset    High Blood Pressure Mother     Kidney Disease Maternal Aunt     Heart Attack Maternal Aunt     Diabetes Maternal Grandmother     Ovarian Cancer Maternal Grandmother     Diabetes Maternal Grandfather     Diabetes Paternal Grandmother     Stroke Paternal Grandmother     Heart Attack Paternal Grandmother  Diabetes Paternal Grandfather     No Known Problems Father      Social History     Tobacco Use    Smoking status: Never Smoker    Smokeless tobacco: Never Used   Substance Use Topics    Alcohol use: Yes     Comment: social     ALLERGIES:    Allergies   Allergen Reactions    Flonase [Fluticasone] Hives          Subjective   Review of Systems   · Constitutional:  Negative for activity change, appetite change,unexpected weight change, chills, fever, and fatigue. · HENT: Negative for ear pain, sore throat,  Rhinorrhea, sinus pain, sinus pressure, congestion. · Eyes:  Negative for pain and discharge. · Respiratory:  Negative for chest tightness, shortness of breath, wheezing, and cough. · Cardiovascular:  Negative for chest pain, palpitations and leg swelling. · Gastrointestinal: Negative for abdominal pain, blood in stool, constipation,diarrhea, nausea and vomiting. · Endocrine: Negative for cold intolerance, heat intolerance, polydipsia, polyphagia and polyuria. · Genitourinary: Negative for difficulty urinating, dysuria, flank pain, frequency, hematuria and urgency. · Musculoskeletal: Negative for arthralgias, joint swelling, myalgias, neck pain and neck stiffness. Positive for back pain  · Skin: Negative for rash and wound. · Allergic/Immunologic: Negative for environmental allergies and food allergies. · Neurological:  Negative for dizziness, light-headedness, numbness and headaches. · Hematological:  Negative for adenopathy. Does not bruise/bleed easily. · Psychiatric/Behavioral: Negative for self-injury, sleep disturbance and suicidal ideas. Objective   Physical Exam  Musculoskeletal:        Back:         PHYSICAL EXAM:   · Constitutional: Ki is oriented to person, place, and time. Vital signs are normal. Appears well-developed and well-nourished. · HEENT:   · Head: Normocephalic and atraumatic. Neck: Full passive range of motion. Non-tender on palpation. Neck supple. No thyromegaly present. Trachea normal.  · Musculoskeletal: Extremities appear regular and symmetric. No evident masses, lesions, foreign bodies, or other abnormalities. No edema. No tenderness on palpation. Joints are stable. Full ROM, strength and tone are within normal limits. Gait normal   · Neurological: Alert and oriented to person, place, and time. Normal motor function, Normal sensory function, No focal deficits noted. He has normal strength. · Skin: Skin is warm, dry and intact. No obvious lesions on exposed skin  · Psychiatric: Normal mood and affect. Speech is normal and behavior is normal.     Nursing note and vitals reviewed. Blood pressure 120/70, pulse 86, temperature 97.3 °F (36.3 °C), temperature source Temporal, weight 240 lb (108.9 kg), SpO2 96 %. Body mass index is 43.9 kg/m². Wt Readings from Last 3 Encounters:   12/14/20 240 lb (108.9 kg)   12/11/20 243 lb (110.2 kg)   11/17/20 243 lb 9.6 oz (110.5 kg)     BP Readings from Last 3 Encounters:   12/14/20 120/70   11/17/20 132/76   11/05/20 122/72       Hospital Outpatient Visit on 11/17/2020   Component Date Value Ref Range Status    Specimen Description 11/17/2020 . CERVIX   Final    C. trachomatis DNA 11/17/2020 NEGATIVE  NEGATIVE Final    Comment: CHLAMYDIA TRACHOMATIS DNA not detected by nucleic acid amplification. This test is intended for medical purposes only and is not valid for the evaluation of   suspected sexual abuse or for other forensic purposes. In certain contexts, culture may be required to meet applicable laws and regulations for   diagnosis of C. trachomatis and N. gonorrhoeae infections. Per 2014  CDC recommendations, this test does not include confirmation of positive results   by an alternative nucleic acid target.       N. gonorrhoeae DNA 11/17/2020 NEGATIVE  NEGATIVE Final Comment: NEISSERIA GONORRHOEAE DNA not detected by nucleic acid amplification. This test is intended for medical purposes only and is not valid for the evaluation of   suspected sexual abuse or for other forensic purposes. In certain contexts, culture may be required to meet applicable laws and regulations for   diagnosis of C. trachomatis and N. gonorrhoeae infections. Per 2014  CDC recommendations, this test does not include confirmation of positive results   by an alternative nucleic acid target. No results found for this visit on 12/14/20. Completed Orders/Prescriptions   Orders Placed This Encounter   Medications    diclofenac sodium (VOLTAREN) 1 % GEL     Sig: Apply 2 g topically 2 times daily     Dispense:  60 g     Refill:  0               AssessmentPlan/Medical Decision Making     1. Injury of low back, subsequent encounter    - External Referral To Physical Therapy  - diclofenac sodium (VOLTAREN) 1 % GEL; Apply 2 g topically 2 times daily  Dispense: 60 g; Refill: 0    2. Lumbar pain    - External Referral To Physical Therapy  - diclofenac sodium (VOLTAREN) 1 % GEL; Apply 2 g topically 2 times daily  Dispense: 60 g; Refill: 0    3. Need for pneumococcal vaccination    - PNEUMOVAX 23 subcutaneous/IM (Pneumococcal polysaccharide vaccine 23-valent >= 1yo)      Return in about 4 weeks (around 1/11/2021) for follow up back pain. 1.  Ki received counseling on the following healthy behaviors: n/a  2. Patient given educational materials - see patient instructions  3. Was a self-tracking handout given in paper form or via [employer]? No  If yes, see orders or list here. 4.  Discussed use, benefit, and side effects of prescribed medications. Barriers to medication compliance addressed. All patient questions answered. Pt voiced understanding. 5.  Reviewed prior labs, imaging, consultation, follow up, and health maintenance  6. Continue current medications, diet and exercise. 7. Discussed use, benefit, and side effects of prescribed medications. Barriers to medication compliance addressed. All her questions were answered. Pt voiced understanding. Ki will continue current medications, diet and exercise. Patient given educational materials on back stretches    Of the 20 minute duration appointment visit, Allyson Burleson CNP spent at least 50% of the face-to-face time in counseling, explanation of diagnosis, planning of further management, and answering all questions.           Signed:  Allyson Burleson CNP

## 2020-12-14 NOTE — FLOWSHEET NOTE
[] [facility]) - [facility] &  Therapy  [address]  [phone]  F: [phone] [] [address]  [facility]   [address]  P: [phone]  F: [phone] [] [address]  Therapy  [address]  P: [phone]  F: [phone] [x] [address]  P: [phone]  F: [phone] [] [address]  [facility]   [address]   [place]: [phone]  F: [phone]      Physical Therapy Daily Treatment Note    Date:  2020  Patient Name:  Fran Gallegos    :  1995  MRN: [id number]  Physician: Dr. Troncoso Delvis: Johana Fink (30 visits)  Medical Diagnosis: Achilles Tendinitis of LLE                     Rehab Codes: M76.62  Onset date: ~10/1/18             Next 's appt.: 20  Visit# / total visits:     Cancels/No Shows: 1/0    Subjective:    Pain:  [x] Yes  [] No Location: L Achilles  Pain Rating: (0-10 scale) 0/10  Pain altered Tx:  [x] No  [] Yes  Action: N/A  Comments: Pt arrives in CAM boot, brought tennis shoe to trial. Pt states to weaning into tennis shoe at home, wearing for 1 hour in the morning and 1 hour in the evening with only slight increase in pain.      Objective:  Exercises:      Exercise  L Achilles Tendinitis     Reps/ Time Weight/ Level Comments    NuStep x10' Level 8 B LEs only  x          3 way hip  x20 ea [place]- crossed  OKC  Light B UE A x   Step ups- B 20x ea 12\", 6'' Fwd- 8'', lat- 6''  Cues for neutral hip rotation during lat x    SB stretch 3x30\"        Standing  BAPs 20xea Level 3 CW/CCW -   Standing MOBO 20xea   1/3, 2/4 rocks  -   Standing heel/toe raises  3x10 ea  Towel under heel     Able to resume toe raises with modifications to decr ROM x SLS 3x30\"  In // bars for support    Balance Board 2' L2     Rebounder   next    Cones   next                  *Foot Dome x20, 5\" ea    x   *Toe Yoga 20x     x   Toe splay 20x   x                     *Long sitting gastroc stretch 3x30\" ea  Post MT x   Long sitting soleus stretch 3x30'' ea  Post MT x   *4 way ankle Tband  2x15 blue  HEP -   Clamshells  2x10 ea  orange  bilat  Not today  x   SL hip ABD  2x10 ea   bilat  Not today  x   Other:* Given as HEP        Manual: MFR and Hypervolt- fork attachment; to L gastroc     Vasocompression: 15' low compression L ankle supine with bolster and towel roll-     Specific Instructions for next treatment: Begin weaning out of boot next visit per Dr. Ronel García protocol      Treatment Charges: Mins Units   []  Modalities     [x]  Ther Exercise 40 3   [x]  Manual Therapy 15 1   []  Ther Activities     []  Aquatics     []  Vasocompression     []  Other     Total Treatment time 55 4       Assessment: [x] Progressing toward goals. Completed all standing exercises outside of boot this date with tennis shoes, which pt tolerated well, only noting minor soreness with heel raises. Initiate ankle stabilization via SLS which pt was able to complete with only minor soreness. Will plan to progress SLS at next visit. [] No change. [] Other:  [x] Patient would continue to benefit from skilled physical therapy services in order to decrease L gastroc tightness and increase L ankle strength and stability. STG: (to be met in 10 treatments)  1. ? Pain: Decrease pain levels to- UNCLEAR- pain 2-6/10 over this past week   2. ? ROM: Increase flexibility and AROM limitations throughout to equal bilat to reduce difficulty with ADLs- PROGRESSING- 10 deg DF AROM- knee flex; 5 deg DF AROM- knee ext- both with 4/10 P    3. ? Strength:  Increase L ankle strength to 4+/5 globally- PROGRESSING- 4+/5, 4/10 pain with all against PT OP in long-sitting; standing gastroc MMT not assessed this date

## 2020-12-14 NOTE — PATIENT INSTRUCTIONS
Patient Education        Back Stretches: Exercises  Introduction  Here are some examples of exercises for stretching your back. Start each exercise slowly. Ease off the exercise if you start to have pain. Your doctor or physical therapist will tell you when you can start these exercises and which ones will work best for you. How to do the exercises  Overhead stretch   1. Stand comfortably with your feet shoulder-width apart. 2. Looking straight ahead, raise both arms over your head and reach toward the ceiling. Do not allow your head to tilt back. 3. Hold for 15 to 30 seconds, then lower your arms to your sides. 4. Repeat 2 to 4 times. Side stretch   1. Stand comfortably with your feet shoulder-width apart. 2. Raise one arm over your head, and then lean to the other side. 3. Slide your hand down your leg as you let the weight of your arm gently stretch your side muscles. Hold for 15 to 30 seconds. 4. Repeat 2 to 4 times on each side. Press-up   1. Lie on your stomach, supporting your body with your forearms. 2. Press your elbows down into the floor to raise your upper back. As you do this, relax your stomach muscles and allow your back to arch without using your back muscles. As your press up, do not let your hips or pelvis come off the floor. 3. Hold for 15 to 30 seconds, then relax. 4. Repeat 2 to 4 times. Relax and rest   1. Lie on your back with a rolled towel under your neck and a pillow under your knees. Extend your arms comfortably to your sides. 2. Relax and breathe normally. 3. Remain in this position for about 10 minutes. 4. If you can, do this 2 or 3 times each day. Follow-up care is a key part of your treatment and safety. Be sure to make and go to all appointments, and call your doctor if you are having problems. It's also a good idea to know your test results and keep a list of the medicines you take. Where can you learn more? Go to [email or web address] and sign in to your [employer] account. Enter J434 in the [employer]in[employer] box to learn more about \"Back Stretches: Exercises. \"     If you do not have an account, please click on the \"Sign Up Now\" link. Current as of: March 2, 2020               Content Version: 12.6  © 3673-1472 [employer]. Care instructions adapted under license by [facility] ([facility]). If you have questions about a medical condition or this instruction, always ask your healthcare professional. [address] any warranty or liability for your use of this information.

## 2020-12-17 ENCOUNTER — HOSPITAL ENCOUNTER (OUTPATIENT)
Dept: PHYSICAL THERAPY | Facility: CLINIC | Age: 25
Setting detail: THERAPIES SERIES
Discharge: HOME | End: 2020-12-17
Payer: COMMERCIAL

## 2020-12-17 PROCEDURE — 97110 THERAPEUTIC EXERCISES: CPT

## 2020-12-17 PROCEDURE — 97140 MANUAL THERAPY 1/> REGIONS: CPT

## 2020-12-17 NOTE — FLOWSHEET NOTE
[] [facility]) - [facility] &  Therapy  [address]  [phone]  F: [phone] [] [address]  [facility]   [address]  P: [phone]  F: [phone] [] [address]  Therapy  [address] [facility]  P: [phone]  F: [phone] [x] [address]  P: [phone]  F: [phone] [] [address]  [facility]   [address]   [place]: [phone]  F: [phone]      Physical Therapy Daily Treatment Note    Date:  2020  Patient Name:  Alexey Hay    :  1995  MRN: [id number]  Physician: Dr. Lois Britton: Rachel Alarcon (30 visits)  Medical Diagnosis: Achilles Tendinitis of LLE                     Rehab Codes: M76.62  Onset date: ~10/1/18             Next 's appt.: 20  Visit# / total visits:     Cancels/No Shows: 1/0    Subjective:    Pain:  [x] Yes  [] No Location: L Achilles  Pain Rating: (0-10 scale) 0/10  Pain altered Tx:  [x] No  [] Yes  Action: N/A  Comments: Pt reports L achilles feels good, however c/o LBP at 6/10. Pt also sates that the weaning process has gone good, without increased pain. PTA instructed pt to begin 2 hr in the am and 2 hrs in the evening in her shoe as long as no incr pain.      Objective:  Exercises:      Exercise  L Achilles Tendinitis     Reps/ Time Weight/ Level Comments    NuStep x10' Level 8 B LEs only  x          3 way hip  x20 ea [place]   OKC  Light B UE A x   Step ups- B 20x ea  6'' Fwd- 8'', lat- 6''  Cues for neutral hip rotation during lat -    SB stretch 3x30\"     x   Standing  BAPs 20xea Level 3 CW/CCW x   Standing MOBO 20xea   1/3, 2/4 rocks  -   Standing heel/toe raises  3x10 ea  Towel under heel Able to resume toe raises with modifications to decr ROM x   SLS 3x30\" green  x   Balance Board 2' L2  -   Rebounder 2x10 sm red MB Forward  x   Cones 3x  3 cones x                 *Foot Dome x20, 5\" ea    x   *Toe Yoga 20x     x   Toe splay 20x   -                     *Long sitting gastroc stretch 3x30\" ea  Post MT x   Long sitting soleus stretch 3x30'' ea  Post MT x   *4 way ankle Tband  2x15 blue  HEP -   Clamshells  2x10 ea  orange  bilat  Not today  -   SL hip ABD  2x10 ea   bilat  Not today  -   Other:* Given as HEP    Manual: MFR and Hypervolt- fork attachment; to L gastroc     Vasocompression: 15' low compression L ankle supine with bolster and towel roll-- held      Specific Instructions for next treatment: Begin weaning out of boot next visit per Dr. Zainab Bartlett protocol      Treatment Charges: Mins Units   []  Modalities     [x]  Ther Exercise 40 3   [x]  Manual Therapy 15 1   []  Ther Activities     []  Aquatics     []  Vasocompression     []  Other     Total Treatment time 55 4       Assessment: [x] Progressing toward goals. Progressed pt with SLS on green foam with good cheryl. Also added SL cone  with fair cheryl d/t c/o pain in low back towards the end of this exercise. Added rebounder with improved cheryl after cues given to press great toe into ground. Good cheryl to tx this date focusing on balance, proprioception, and gentle strengthening/stretching. [] No change. [] Other:  [x] Patient would continue to benefit from skilled physical therapy services in order to decrease L gastroc tightness and increase L ankle strength and stability.      STG: (to be met in 10 treatments)  1. ? Pain: Decrease pain levels to- UNCLEAR- pain 2-6/10 over this past week   2. ? ROM: Increase flexibility and AROM limitations throughout to equal bilat to reduce difficulty with ADLs- PROGRESSING- 10 deg DF AROM- knee flex; 5 deg DF AROM- knee ext- both with 4/10 P 3. ? Strength: Increase L ankle strength to 4+/5 globally- PROGRESSING- 4+/5, 4/10 pain with all against PT OP in long-sitting; standing gastroc MMT not assessed this date   4. ? Function: Pt to progress ambulation out of CAM boot as tolerated and allowed per Dr. Moreno Liter Friday   5. Independent with Home Exercise Programs- MET     LTG: (to be met in 20 treatments)  1. Improve score on assessment tool from 37.5% impaired to 15% impaired demonstrating an improvement in ADLs- PROGRESSING- 30% impaired   2. Reduce pain levels to 0/10- NOT MET     Patient goals: To have decreased L ankle pain- PROGRESSING     Pt. Education:  [x] Yes  [] No  [] Reviewed Prior HEP/Ed  Method of Education: [x] Verbal  [] Demo  [] Written  Comprehension of Education:  [x] Verbalizes understanding. [] Demonstrates understanding. [] Needs review. [] Demonstrates/verbalizes HEP/Ed previously given. Plan: [x] Continue current frequency toward long and short term goals. [x] Specific Instructions for subsequent treatments: Follow-up with pt regarding visit with referring MD- adjust PT POC and treatment pending instructions regarding L LE CAM boot.      Frequency:  2 x/week for 20 visits      Time In: 8:00am            Time Out: 9:05am    Electronically signed by:  Liam Philippe PTA

## 2020-12-21 ENCOUNTER — HOSPITAL ENCOUNTER (OUTPATIENT)
Dept: PHYSICAL THERAPY | Facility: CLINIC | Age: 25
Setting detail: THERAPIES SERIES
Discharge: HOME | End: 2020-12-21
Payer: COMMERCIAL

## 2020-12-21 PROCEDURE — 97110 THERAPEUTIC EXERCISES: CPT

## 2020-12-21 PROCEDURE — 97140 MANUAL THERAPY 1/> REGIONS: CPT

## 2020-12-21 PROCEDURE — 97530 THERAPEUTIC ACTIVITIES: CPT

## 2020-12-21 NOTE — FLOWSHEET NOTE
[] [facility]) - [facility] &  Therapy  [address]  [phone]  F: [phone] [] [address]  [place] [address]   [address]  P: [phone]  F: [phone] [] [address] &  Therapy  [address] [facility]  P: [phone]  F: [phone] [x] [address]  P: [phone]  F: [phone] [] [address]  [facility]   [address]   [place]: [phone]  F: [phone]      Physical Therapy Daily Treatment Note/ Re-evaluation     Date:  2020  Patient Name:  Darrion Ding    :  1995  MRN: [id number]  Physician: Dr. Black Powers: Jose Wilder (30 visits)  Medical Diagnosis: Achilles Tendinitis of LLE                     Rehab Codes: M76.62  Onset date: ~10/1/18             Next Dr's appt.: 20  Visit# / total visits:     Cancels/No Shows: 1/0    Subjective:    Pain:  [x] Yes  [] No Location: Low back Pain Rating: (0-10 scale) 5/10  Pain altered Tx:  [x] No  [] Yes  Action: N/A  Comments: Pt arrives for re-eval d/t arriving with a new script for low back pain. 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 Had an xray which was negative, d/t pain continuing DrJulita Recommended PT. Pt states pain is worse with forward flexion. Pain is on R side lumbar, radiates at times into buttocks. Has burning/tingling that radiates updward and downward. Pt arrives with improvement in L achilles pain, states that is able to wear tennis shoe majority of the time, only wearing CAM boot at work.      Objective:    Tightness noted in R hip flexor and R piriformis, DI to both  RLE functionally shorter than left, improved with MOB and post DI to hip flexor  Exercises:      Exercise  L Achilles Tendinitis     Reps/ Time Weight/ Level Comments   Bike x10' Level 8 B LEs only          3 way hip  x20 ea Lime   [employer]  Light B UE A   Step ups- B 20x ea  6'' Fwd- 8'', lat- 6''  Cues for neutral hip rotation during lat    SB stretch 3x30\"       Standing  BAPs 20xea Level 3 CW/CCW   Standing MOBO 20xea   1/3, 2/4 rocks    Standing heel/toe raises  3x10 ea  Towel under heel     Able to resume toe raises with modifications to decr ROM   SLS 3x30\" green    Balance Board 2' L2    Rebounder 2x10 sm red MB Forward and lateral   Cones 3x  3 cones         Supine piriformis stretch 3x30\"     LTR 2x10     Half kneel hip flexor stretch 3x30\"           *Foot Dome x20, 5\" ea      *Toe Yoga 20x       Toe splay 20x                     *Long sitting gastroc stretch 3x30\" ea  Post MT   Long sitting soleus stretch 3x30'' ea  Post MT   *4 way ankle Tband  2x15 blue  HEP   Clamshells  2x10 ea  orange  bilat  Not today    SL hip ABD  2x10 ea   bilat  Not today    Other:* Given as HEP  Bolded only d/t re-evaluation for low back pain    Manual: MFR and Hypervolt- fork attachment; to L gastroc- not today  DI to R piriformis and hip flexor, Hypervolt to R lumbar paraspinals    Integrative Dry Needling: R Lumbar paraspinals. Dry Needling initiated this date with all risks and benefits explained prior. No adverse effects noted post.      Vasocompression: 15' low compression L ankle supine with bolster and towel roll-- held      Specific Instructions for next treatment: Focus on Low back exercises, complete bolded achillies exercises only d/t pt having decreased pain and able to complete majority of exercises at home     Treatment Charges: Mins Units   []  Modalities     [x]  Ther Exercise 15 1   [x]  Manual Therapy 25 2   [x]  Ther Activities 15 1   []  Aquatics     []  Vasocompression     [x]  Other: Dry Needling 5 0   Total Treatment time 60 4       Assessment: [x] Progressing toward goals. Focused therapy on low back this date d/t pt arriving with new script to address low back pain. Completed a low back screening to address back pain. [] No change. [] Other:  [x] Patient would continue to benefit from skilled physical therapy services in order to decrease L gastroc tightness and increase L ankle strength and stability. STG: (to be met in 10 treatments)  1. ? Pain: Decrease pain levels to- UNCLEAR- pain 2-6/10 over this past week   2. ? ROM: Increase flexibility and AROM limitations throughout to equal bilat to reduce difficulty with ADLs- PROGRESSING- 10 deg DF AROM- knee flex; 5 deg DF AROM- knee ext- both with 4/10 P    3. ? Strength: Increase L ankle strength to 4+/5 globally- PROGRESSING- 4+/5, 4/10 pain with all against PT OP in long-sitting; standing gastroc MMT not assessed this date   4. ? Function: Pt to progress ambulation out of CAM boot as tolerated and allowed per Dr. Oswald Oz Friday   5. Independent with Home Exercise Programs- MET     LTG: (to be met in 20 treatments)  1. Improve score on assessment tool from 37.5% impaired to 15% impaired demonstrating an improvement in ADLs- PROGRESSING- 30% impaired   2. Reduce pain levels to 0/10- NOT MET     Patient goals: To have decreased L ankle pain- PROGRESSING     Pt. Education:  [x] Yes  [] No  [] Reviewed Prior HEP/Ed  Method of Education: [x] Verbal  [] Demo  [] Written  Comprehension of Education:  [x] Verbalizes understanding. [] Demonstrates understanding. [] Needs review. [] Demonstrates/verbalizes HEP/Ed previously given. Plan: [x] Continue current frequency toward long and short term goals.         Frequency:  2 x/week for 20 visits      Time In: 1000           Time Out: 1100    Electronically signed by:  Axel Manuel, PT

## 2020-12-23 ENCOUNTER — HOSPITAL ENCOUNTER (OUTPATIENT)
Dept: PHYSICAL THERAPY | Facility: CLINIC | Age: 25
Setting detail: THERAPIES SERIES
Discharge: HOME | End: 2020-12-23
Payer: COMMERCIAL

## 2020-12-23 NOTE — FLOWSHEET NOTE
[] [facility]) [facility] &  Therapy  [address]    [phone]  F: [phone]   [] [address]  [facility]   [address]  P: [phone]  F: [phone]  [] [place]  [address]  P: [phone]  F: [phone] [x] [address]  P: [phone]  F: [phone]  [] [address]  [address] [facility] [address]   [address]   [place]: [phone]  F: [phone]   [] [facility]  [address] [facility] [address]  [place]: [phone]   F: [phone]     Physical Therapy Cancel/No Show note    Date: 2020  Patient: Stephanie Navarro  : 1995  MRN: [id number]    Cancels/No Shows to date: 0    For today's appointment patient:    [x]  Cancelled    [x] Rescheduled appointment    [] No-show     Reason given by patient:    []  Patient ill    []  Conflicting appointment    [] No transportation      [] Conflict with work    [x] No reason given    [] Weather related    [] COVID-19    [] Other:      Comments:        [x] Next appointment was confirmed    Electronically signed by: Williams Roberts

## 2020-12-28 ENCOUNTER — HOSPITAL ENCOUNTER (OUTPATIENT)
Dept: PHYSICAL THERAPY | Facility: CLINIC | Age: 25
Setting detail: THERAPIES SERIES
Discharge: HOME | End: 2020-12-28
Payer: COMMERCIAL

## 2020-12-28 PROCEDURE — 97140 MANUAL THERAPY 1/> REGIONS: CPT

## 2020-12-28 PROCEDURE — 97110 THERAPEUTIC EXERCISES: CPT

## 2020-12-28 NOTE — FLOWSHEET NOTE
[] [facility]) [facility] &  Therapy  [address]  [phone]  F: [phone] [] [address]  [place] [address]   [address]  P: [phone]  F: [phone] [] [address]  Therapy  [address]  P: [phone]  F: [phone] [x] [address]  P: [phone]  F: [phone] [] [address]  [facility]   [address]   [place]: [phone]  F: [phone]      Physical Therapy Daily Treatment Note    Date:  2020  Patient Name:  Shanita Carrasco    :  1995  MRN: [id number]  Physician: Dr. Crawford Grain: [facility] (30 visits)  Medical Diagnosis: Achilles Tendinitis of LLE                     Rehab Codes: M76.62  Onset date: ~10/1/18             Next 's appt.: 20  Visit# / total visits: 15/20    Cancels/No Shows: 1/0    Subjective:    Pain:  [x] Yes  [] No Location: Low back Pain Rating: (0-10 scale) ankle - 1/10 low back - 5/10  Pain altered Tx:  [x] No  [] Yes  Action: N/A  Comments: Pt arrives reporting no pain and that everything has been going well. Reports taking her CAM boot off for ~1 hour at work w/ no problems. Her low back is bothersome when bending over and sitting.      Objective:    Tightness noted in R hip flexor and R piriformis, DI to both  RLE functionally shorter than left, improved with MOB and post DI to hip flexor  Exercises:       Exercise  L Achilles Tendinitis     Reps/ Time Weight/ Level Comments Completed   Bike x10' Level 8 B LEs only  x          3 way hip  x20 ea Lime   [facility]  Light B UE A    Step ups- B 20x ea  6'' Fwd- 8'', lat- 6''  Cues for neutral hip rotation during lat    Gastroc/HS S 3x30\"     x   Standing  BAPs 20xea Level 3 CW/CCW    Standing physical therapy services in order to decrease L gastroc tightness and increase L ankle strength and stability. STG: (to be met in 10 treatments)  1. ? Pain: Decrease pain levels to- UNCLEAR- pain 2-6/10 over this past week   2. ? ROM: Increase flexibility and AROM limitations throughout to equal bilat to reduce difficulty with ADLs- PROGRESSING- 10 deg DF AROM- knee flex; 5 deg DF AROM- knee ext- both with 4/10 P    3. ? Strength: Increase L ankle strength to 4+/5 globally- PROGRESSING- 4+/5, 4/10 pain with all against PT OP in long-sitting; standing gastroc MMT not assessed this date   4. ? Function: Pt to progress ambulation out of CAM boot as tolerated and allowed per Dr. Gonzalez Ward Friday   5. Independent with Home Exercise Programs- MET     LTG: (to be met in 20 treatments)  1. Improve score on assessment tool from 37.5% impaired to 15% impaired demonstrating an improvement in ADLs- PROGRESSING- 30% impaired   2. Reduce pain levels to 0/10- NOT MET     Patient goals: To have decreased L ankle pain- PROGRESSING     Pt. Education:  [x] Yes  [] No  [] Reviewed Prior HEP/Ed  Method of Education: [x] Verbal  [] Demo  [] Written  Comprehension of Education:  [x] Verbalizes understanding. [] Demonstrates understanding. [] Needs review. [] Demonstrates/verbalizes HEP/Ed previously given. Plan: [x] Continue current frequency toward long and short term goals.         Frequency:  2 x/week for 20 visits      Time In: 0342           Time Out: 1240    Electronically signed by:  Heath Coy PTA

## 2021-01-05 ENCOUNTER — HOSPITAL ENCOUNTER (OUTPATIENT)
Dept: PHYSICAL THERAPY | Facility: CLINIC | Age: 26
Setting detail: THERAPIES SERIES
Discharge: HOME | End: 2021-01-05
Payer: COMMERCIAL

## 2021-01-05 PROCEDURE — 97140 MANUAL THERAPY 1/> REGIONS: CPT

## 2021-01-05 PROCEDURE — 97110 THERAPEUTIC EXERCISES: CPT

## 2021-01-05 NOTE — FLOWSHEET NOTE
[] [facility]) [facility] &  Therapy  [address]  [phone]  F: [phone] [] [address]  [place] [address]   [address]  P: [phone]  F: [phone] [] [address]  Therapy  [address] [address] [facility] [address]  P: [phone]  F: [phone] [x] [address]  P: [phone]  F: [phone] [] [address]  [facility]   [address]   [place]: [phone]  F: [phone]      Physical Therapy Daily Treatment Note    Date:  2021  Patient Name:  Frantz Valle    :  1995  MRN: [id number]  Physician: Dr. Carmen Duran:  (30 visits)  Medical Diagnosis: Achilles Tendinitis of LLE                     Rehab Codes: M76.62  Onset date: ~10/1/18             Next 's appt.: 20  Visit# / total visits:     Cancels/No Shows: 1/0    Subjective:    Pain:  [x] Yes  [] No Location: Low back Pain Rating: (0-10 scale) ankle - 1/10 low back - 5/10  Pain altered Tx:  [x] No  [] Yes  Action: N/A  Comments: Pt arrives stating to having increased back pain over the weekend, subsided currently.  Has totally weaned out of CAM boot at work without any difficulty     Objective:      Exercises:       Exercise  L Achilles Tendinitis     Reps/ Time Weight/ Level Comments Completed   Bike x10' Level 8 B LEs only  x          Gastroc/HS S 3x30\"     x          PPT 15x   x   Sitting piriformis stretch 3x30\"   x   LTR 2x10   x   Bridges 10x   x   Half kneel hip flexor stretch 3x30\"   x          Other:* Given as HEP      Manual:   DI to R piriformis and hip flexor, Hypervolt to R lumbar paraspinals and R piriformis     Integrative Dry Needling: R Lumbar paraspinals, R piriformis, R inferior cluneals     Vasocompression: 13' low compression L ankle supine with bolster and towel roll- Not 12/28      Specific Instructions for next treatment:      Treatment Charges: Mins Units   []  Modalities     [x]  Ther Exercise 15 1   [x]  Manual Therapy 25 2   []  Ther Activities     []  Aquatics     []  Vasocompression     []  Other: Dry Needling     Total Treatment time 40 3       Assessment: [x] Progressing toward goals. Focused therapy on low back manual/stretches, [address] all ankle exercises d/t pt having no ankle pain and has returned to working a full shift without a CAM boot. [] No change. [] Other:   [x] Patient would continue to benefit from skilled physical therapy services in order to decrease L gastroc tightness and increase L ankle strength and stability. STG: (to be met in 10 treatments)  1. ? Pain: Decrease pain levels to- UNCLEAR- pain 2-6/10 over this past week- MET   2. ? ROM: Increase flexibility and AROM limitations throughout to equal bilat to reduce difficulty with ADLs- PROGRESSING- 10 deg DF AROM- knee flex; 5 deg DF AROM- knee ext- both with 4/10 P  MET   3. ? Strength: Increase L ankle strength to 4+/5 globally- PROGRESSING- 4+/5, 4/10 pain with all against PT OP in long-sitting; standing gastroc MMT not assessed this date MET  4. ? Function: Pt to progress ambulation out of CAM boot as tolerated and allowed per Dr. Wilbert Garcia Friday   5. Independent with Home Exercise Programs- MET     LTG: (to be met in 20 treatments)  1. Improve score on assessment tool from 37.5% impaired to 15% impaired demonstrating an improvement in ADLs- PROGRESSING- 30% impaired   2. Reduce pain levels to 0/10-  MET for ankle, see below for updated low back goals  3. Pt to have decreased low back pain to 2/10 after working a full shift      Patient goals: To have decreased L ankle pain- PROGRESSING     Pt.  Education:  [x] Yes  [] No  [] Reviewed Prior HEP/Ed Method of Education: [x] Verbal  [] Demo  [] Written  Comprehension of Education:  [x] Verbalizes understanding. [] Demonstrates understanding. [] Needs review. [] Demonstrates/verbalizes HEP/Ed previously given. Plan: [x] Continue current frequency toward long and short term goals.         Frequency:  2 x/week for 20 visits      Time In: 1000           Time Out: 1010    Electronically signed by:  Vijaya Montero PT

## 2021-01-07 ENCOUNTER — HOSPITAL ENCOUNTER (OUTPATIENT)
Dept: PHYSICAL THERAPY | Facility: CLINIC | Age: 26
Setting detail: THERAPIES SERIES
Discharge: HOME | End: 2021-01-07
Payer: COMMERCIAL

## 2021-01-07 PROCEDURE — 97140 MANUAL THERAPY 1/> REGIONS: CPT

## 2021-01-07 PROCEDURE — 97110 THERAPEUTIC EXERCISES: CPT

## 2021-01-07 NOTE — FLOWSHEET NOTE
[] [facility]) [facility] &  Therapy  [address]  [phone]  F: [phone] [] [address]  [facility]   [address]  P: [phone]  F: [phone] [] [address]  Therapy  [address]  P: [phone]  F: [phone] [x] [address]  P: [phone]  F: [phone] [] [address]  [facility]   [address]   [place]: [phone]  F: [phone]      Physical Therapy Daily Treatment Note    Date:  2021  Patient Name:  Frantz Valle    :  1995  MRN: [id number]  Physician: Dr. Carmen Duran:  (30 visits)  Medical Diagnosis: Achilles Tendinitis of LLE                     Rehab Codes: M76.62  Onset date: ~10/1/18             Next 's appt.: 20  Visit# / total visits:     Cancels/No Shows: 1/0    Subjective:    Pain:  [x] Yes  [] No Location: Low back Pain Rating: (0-10 scale) ankle - 1/10 low back - 2/10  Pain altered Tx:  [x] No  [] Yes  Action: N/A  Comments: Pt arrives stating to having decreased back pain, reports no ankle pain.      Objective:      Exercises:       Exercise  L Achilles Tendinitis     Reps/ Time Weight/ Level Comments Completed   Bike x10' Level 8 B LEs only  x          Gastroc/HS S 3x30\"     x          PPT 15x   x   PPT alt UE 2x10      PPT marches 2x10      PPT alt UE and LE 2x10      Sitting piriformis stretch 3x30\"   x   LTR 2x10   x   Bridges 10x   x   Half kneel hip flexor stretch 3x30\"   x          Other:* Given as HEP      Manual:   DI to R piriformis and hip flexor, Hypervolt to R lumbar paraspinals and R piriformis     Integrative Dry Needling: R Lumbar paraspinals    Vasocompression: 13' low compression L ankle supine with bolster and towel roll- Not 12/28      Specific Instructions for next treatment:      Treatment Charges: Mins Units   []  Modalities     [x]  Ther Exercise 15 1   [x]  Manual Therapy 25 2   []  Ther Activities     []  Aquatics     []  Vasocompression     []  Other: Dry Needling     Total Treatment time 40 3       Assessment: [x] Progressing toward goals. Progressed core stability program via adding additional PPT exercises. Pt reporting soreness in core however no increase in back pain. Discussed continuing therapy two times a week next week and then decreasing to one time per week or every other week, as long as pain continues to decrease. Pt in agreement. [] No change. [] Other:   [x] Patient would continue to benefit from skilled physical therapy services in order to decrease L gastroc tightness and increase L ankle strength and stability. STG: (to be met in 10 treatments)  1. ? Pain: Decrease pain levels to- UNCLEAR- pain 2-6/10 over this past week- MET   2. ? ROM: Increase flexibility and AROM limitations throughout to equal bilat to reduce difficulty with ADLs- PROGRESSING- 10 deg DF AROM- knee flex; 5 deg DF AROM- knee ext- both with 4/10 P  MET   3. ? Strength: Increase L ankle strength to 4+/5 globally- PROGRESSING- 4+/5, 4/10 pain with all against PT OP in long-sitting; standing gastroc MMT not assessed this date MET  4. ? Function: Pt to progress ambulation out of CAM boot as tolerated and allowed per Dr. Aminata Bautista Friday   5. Independent with Home Exercise Programs- MET     LTG: (to be met in 20 treatments)  1. Improve score on assessment tool from 37.5% impaired to 15% impaired demonstrating an improvement in ADLs- PROGRESSING- 30% impaired   2. Reduce pain levels to 0/10-  MET for ankle, see below for updated low back goals  3.  Pt to have decreased low back pain to 2/10 after working a full shift    Patient goals: To have decreased L ankle pain- PROGRESSING     Pt. Education:  [x] Yes  [] No  [] Reviewed Prior HEP/Ed  Method of Education: [x] Verbal  [] Demo  [] Written  Comprehension of Education:  [x] Verbalizes understanding. [] Demonstrates understanding. [] Needs review. [] Demonstrates/verbalizes HEP/Ed previously given. Plan: [x] Continue current frequency toward long and short term goals.         Frequency:  2 x/week for 20 visits      Time In: 1100           Time Out: 1502    Electronically signed by:  Rebecca Merrill, PT

## 2021-01-12 ENCOUNTER — HOSPITAL ENCOUNTER (OUTPATIENT)
Dept: PHYSICAL THERAPY | Facility: CLINIC | Age: 26
Setting detail: THERAPIES SERIES
Discharge: HOME | End: 2021-01-12
Payer: COMMERCIAL

## 2021-01-12 PROCEDURE — 97110 THERAPEUTIC EXERCISES: CPT

## 2021-01-12 NOTE — FLOWSHEET NOTE
[] [facility]) - [facility] &  Therapy  [address]  [phone]  F: [phone] [] [address]  [place] [address]   [address]  P: [phone]  F: [phone] [] [address]  Therapy  [address]  P: [phone]  F: [phone] [x] [address]  P: [phone]  F: [phone] [] [address]  [facility]   [address]   [place]: [phone]  F: [phone]      Physical Therapy Daily Treatment Note    Date:  2021  Patient Name:  Orestes Kay    :  1995  MRN: [id number]  Physician: Dr. Mago Kenny: Krysten Massena (30 visits)  Medical Diagnosis: Achilles Tendinitis of LLE                     Rehab Codes: M76.62  Onset date: ~10/1/18             Next 's appt.: 20  Visit# / total visits:     Cancels/No Shows: 1/0    Subjective:    Pain:  [x] Yes  [] No Location: Low back Pain Rating: (0-10 scale) 0/10  Pain altered Tx:  [x] No  [] Yes  Action: N/A  Comments: Pt arrives with no pain, states wishes to be done with therapy this date.      Objective:      Exercises:       Exercise  L Achilles Tendinitis     Reps/ Time Weight/ Level Comments Completed   Bike x10' Level 8 B LEs only  x          Gastroc/HS S 3x30\"     x          PPT 15x   x   PPT alt UE 2x10      PPT marches 2x10      PPT alt UE and LE 2x10      Sitting piriformis stretch 3x30\"   x   LTR 2x10   x   Bridges 10x   x   Half kneel hip flexor stretch 3x30\"   x          Other:* Given as HEP      Manual:   DI to R piriformis and hip flexor, Hypervolt to R lumbar paraspinals and R piriformis - not today     Integrative Dry Needling: R Lumbar paraspinals- not today     Vasocompression: 13' low compression L ankle supine with bolster and towel roll- Not 12/28      Specific Instructions for next treatment:      Treatment Charges: Mins Units   []  Modalities     [x]  Ther Exercise 25 2   []  Manual Therapy     []  Ther Activities     []  Aquatics     []  Vasocompression     []  Other: Dry Needling     Total Treatment time 25 2       Assessment: [x] Progressing toward goals. Pt arrived again with no pain, agreeable to be discharged from therapy this date. Distributed updated HEP. Educated pt that will keep chart on hold for two weeks in case symptoms return, otherwise will DC.       [] No change. [] Other:   [x] Patient would continue to benefit from skilled physical therapy services in order to decrease L gastroc tightness and increase L ankle strength and stability. STG: (to be met in 10 treatments)  1. ? Pain: Decrease pain levels to- UNCLEAR- pain 2-6/10 over this past week- MET   2. ? ROM: Increase flexibility and AROM limitations throughout to equal bilat to reduce difficulty with ADLs- PROGRESSING- 10 deg DF AROM- knee flex; 5 deg DF AROM- knee ext- both with 4/10 P  MET   3. ? Strength: Increase L ankle strength to 4+/5 globally- PROGRESSING- 4+/5, 4/10 pain with all against PT OP in long-sitting; standing gastroc MMT not assessed this date MET  4. ? Function: Pt to progress ambulation out of CAM boot as tolerated and allowed per Dr. Gerardo Hall Friday MET   5. Independent with Home Exercise Programs- MET     LTG: (to be met in 20 treatments)  1. Improve score on assessment tool from 37.5% impaired to 15% impaired demonstrating an improvement in ADLs- PROGRESSING- 30% impaired  MET, 7% impaired  2. Reduce pain levels to 0/10-  MET for ankle, see below for updated low back goals  3.  Pt to have decreased low back pain to 2/10 after working a full shift MET, no pain after working     Patient goals: To have decreased L ankle pain- PROGRESSING     Pt. Education:  [x] Yes  [] No  [] Reviewed Prior HEP/Ed  Method of Education: [x] Verbal  [x] Demo  [x] Written  Comprehension of Education:  [x] Verbalizes understanding. [] Demonstrates understanding. [] Needs review. [] Demonstrates/verbalizes HEP/Ed previously given. Plan: [] Continue current frequency toward long and short term goals.   DC this date        Frequency: DC      Time In: 1100           Time Out: 1130    Electronically signed by:  Keenan Ormond, PT

## 2021-01-14 ENCOUNTER — APPOINTMENT (OUTPATIENT)
Dept: PHYSICAL THERAPY | Facility: CLINIC | Age: 26
End: 2021-01-14
Payer: COMMERCIAL

## 2021-01-14 ENCOUNTER — OFFICE VISIT (OUTPATIENT)
Dept: OBGYN CLINIC | Age: 26
End: 2021-01-14
Payer: COMMERCIAL

## 2021-01-14 VITALS
HEIGHT: 62 IN | DIASTOLIC BLOOD PRESSURE: 82 MMHG | WEIGHT: 249 LBS | SYSTOLIC BLOOD PRESSURE: 110 MMHG | BODY MASS INDEX: 45.82 KG/M2

## 2021-01-14 DIAGNOSIS — Z97.5 IUD (INTRAUTERINE DEVICE) IN PLACE: ICD-10-CM

## 2021-01-14 DIAGNOSIS — Z30.431 IUD CHECK UP: ICD-10-CM

## 2021-01-14 DIAGNOSIS — E28.2 PCOS (POLYCYSTIC OVARIAN SYNDROME): Primary | ICD-10-CM

## 2021-01-14 DIAGNOSIS — N92.6 IRREGULAR MENSES: ICD-10-CM

## 2021-01-14 PROCEDURE — 1036F TOBACCO NON-USER: CPT | Performed by: OBSTETRICS & GYNECOLOGY

## 2021-01-14 PROCEDURE — G8428 CUR MEDS NOT DOCUMENT: HCPCS | Performed by: OBSTETRICS & GYNECOLOGY

## 2021-01-14 PROCEDURE — 99213 OFFICE O/P EST LOW 20 MIN: CPT | Performed by: OBSTETRICS & GYNECOLOGY

## 2021-01-14 PROCEDURE — G8417 CALC BMI ABV UP PARAM F/U: HCPCS | Performed by: OBSTETRICS & GYNECOLOGY

## 2021-01-14 PROCEDURE — G8484 FLU IMMUNIZE NO ADMIN: HCPCS | Performed by: OBSTETRICS & GYNECOLOGY

## 2021-01-14 ASSESSMENT — ENCOUNTER SYMPTOMS
CONSTIPATION: 0
COUGH: 0
VOMITING: 0
WHEEZING: 0
NAUSEA: 0
DIARRHEA: 0
ABDOMINAL PAIN: 0

## 2021-01-14 NOTE — PROGRESS NOTES
[address] [facility], [address] [place] [address]  DATE OF VISIT:  21    Pili Lewis    :  1995  CHIEF COMPLAINT:    Chief Complaint   Patient presents with    Follow-up     20 Mirena IUD Insertion        HPI :   Pili Lewis is a 26 y.o. female here for follow up after IUD insertion 20. She reports still having daily spotting with it, but no cramping or pain. No issues with the strings.      Past Medical History:   Diagnosis Date    Allergic rhinitis     Anxiety     Asthma     Benign neoplasm of skin, site unspecified     Depression     PTSD (post-traumatic stress disorder)       Past Surgical History:   Procedure Laterality Date    INTRAUTERINE DEVICE INSERTION  2020    Mirena    LAPAROSCOPY  14    with peritoneal washings    WISDOM TOOTH EXTRACTION       Family History   Problem Relation Age of Onset    High Blood Pressure Mother     Kidney Disease Maternal Aunt     Heart Attack Maternal Aunt     Diabetes Maternal Grandmother     Ovarian Cancer Maternal Grandmother     Diabetes Maternal Grandfather     Diabetes Paternal Grandmother     Stroke Paternal Grandmother     Heart Attack Paternal Grandmother     Diabetes Paternal Grandfather     No Known Problems Father      Social History     Tobacco Use   Smoking Status Never Smoker   Smokeless Tobacco Never Used     Social History     Substance and Sexual Activity   Alcohol Use Yes    Comment: social     Current Outpatient Medications   Medication Sig Dispense Refill    diclofenac sodium (VOLTAREN) 1 % GEL Apply 2 g topically 2 times daily 60 g 0    escitalopram (LEXAPRO) 10 MG tablet Take 10 mg by mouth daily      prazosin (MINIPRESS) 1 MG capsule Take 1 mg by mouth nightly      omeprazole 20 MG EC tablet Take 1 tablet by mouth daily for 20 days Take on empty stomach 30 minutes before meals 20 tablet 0  albuterol sulfate  (90 Base) MCG/ACT inhaler Inhale 2 puffs into the lungs every 6 hours as needed for Wheezing or Shortness of Breath 1 Inhaler 0    hydrOXYzine (ATARAX) 25 MG tablet Take 1 tablet by mouth 3 times daily as needed       Current Facility-Administered Medications   Medication Dose Route Frequency Provider Last Rate Last Admin    levonorgestrel (MIRENA) IUD 52 mg 1 each  1 each Intrauterine Continuous Margarite Mater, DO   1 each at 20 0068       Allergies:  Flonase [fluticasone]    Gynecologic History:  No LMP recorded. (Menstrual status: Irregular periods). Sexually Active: No  STD History: No      OB History    Para Term  AB Living   0 0 0 0 0 0   SAB TAB Ectopic Molar Multiple Live Births   0 0 0 0 0 0       Review of Systems   Constitutional: Negative for chills and fever. HENT: Negative for hearing loss. Respiratory: Negative for cough and wheezing. Cardiovascular: Negative for chest pain and palpitations. Gastrointestinal: Negative for abdominal pain, constipation, diarrhea, nausea and vomiting. Genitourinary: Positive for menstrual problem and vaginal bleeding. Negative for dysuria, frequency and urgency. Musculoskeletal: Negative for myalgias. Skin: Negative for rash. Neurological: Negative for dizziness, weakness and headaches. Hematological: Does not bruise/bleed easily. Psychiatric/Behavioral: Negative for suicidal ideas. There were no vitals taken for this visit. Physical Exam  Constitutional:       Appearance: She is well-developed. HENT:      Head: Normocephalic. Neck:      Musculoskeletal: Normal range of motion. Thyroid: No thyromegaly. Cardiovascular:      Rate and Rhythm: Normal rate and regular rhythm. Pulmonary:      Effort: Pulmonary effort is normal. No respiratory distress. Abdominal:      General: There is no distension. Palpations: Abdomen is soft. Tenderness: There is no abdominal tenderness. Genitourinary:     Labia:         Right: No rash, tenderness, lesion or injury. Left: No rash, tenderness, lesion or injury. Cervix: No cervical motion tenderness, discharge, friability, lesion, erythema, cervical bleeding or eversion. Uterus: Not deviated, not enlarged, not fixed, not tender and no uterine prolapse. Comments: IUD strings x 2 visualized    Musculoskeletal: Normal range of motion. Skin:     General: Skin is warm and dry. Neurological:      Mental Status: She is alert and oriented to person, place, and time. Psychiatric:         Behavior: Behavior normal.         Thought Content: Thought content normal.         Judgment: Judgment normal.         ASSESSMENT:  Alexa Campo is a 26 y.o. female      ICD-10-CM    1. PCOS (polycystic ovarian syndrome)  E28.2    2. Irregular menses  N92.6    3. Mirena IUD in place 11/17/20  Z97.5    4. IUD check up  Z30.431        PLAN:    Continue IUD. Pt will follow up if bleeding does not stop within 6 months.      Electronically signed by Xander Jacob MD on 1/14/2021 at 11:54 AM

## 2021-01-15 ENCOUNTER — OFFICE VISIT (OUTPATIENT)
Dept: FAMILY MEDICINE CLINIC | Age: 26
End: 2021-01-15
Payer: COMMERCIAL

## 2021-01-15 VITALS
OXYGEN SATURATION: 97 % | TEMPERATURE: 97.2 F | WEIGHT: 252 LBS | BODY MASS INDEX: 46.09 KG/M2 | SYSTOLIC BLOOD PRESSURE: 100 MMHG | DIASTOLIC BLOOD PRESSURE: 60 MMHG | HEART RATE: 91 BPM

## 2021-01-15 DIAGNOSIS — M54.50 LUMBAR SPINE PAIN: Primary | ICD-10-CM

## 2021-01-15 DIAGNOSIS — Z78.9 FAILURE OF OUTPATIENT TREATMENT: ICD-10-CM

## 2021-01-15 PROCEDURE — G8484 FLU IMMUNIZE NO ADMIN: HCPCS | Performed by: NURSE PRACTITIONER

## 2021-01-15 PROCEDURE — G8417 CALC BMI ABV UP PARAM F/U: HCPCS | Performed by: NURSE PRACTITIONER

## 2021-01-15 PROCEDURE — G8427 DOCREV CUR MEDS BY ELIG CLIN: HCPCS | Performed by: NURSE PRACTITIONER

## 2021-01-15 PROCEDURE — 99213 OFFICE O/P EST LOW 20 MIN: CPT | Performed by: NURSE PRACTITIONER

## 2021-01-15 PROCEDURE — 1036F TOBACCO NON-USER: CPT | Performed by: NURSE PRACTITIONER

## 2021-01-15 NOTE — PROGRESS NOTES
[facility]  [phone] [facility]. [address]  [phone]  [phone]    Lisa Solitario is a 26 y.o. female who is here with c/o of:    Chief Complaint: Back Pain (back doing about the same)      Patient Accompanied by: n/a    HPI - Lisa Solitario is here today with c/o:    Patient here for follow up back pain that has been ongoing since September. She says that PT has helped some. She has her good days and bad days. She has been taking tylenol, ibuprofen, voltaren gel with temporary relief. She has completed 4 weeks of PT. She says the treatments she has been trying just provide her temporary relief. Reports that everyday tasks cause her pain; bending, turning, lifting. Describes pain still as sharp and burning on occasion but achy all the time. Denies radiation of pain. Patient had XR lumbar spine in September that was unremarkable. Narrative   EXAMINATION:   THREE XRAY VIEWS OF THE LUMBAR SPINE       9/15/2020 12:23 pm       COMPARISON:   None.       HISTORY:   ORDERING SYSTEM PROVIDED HISTORY: Injury of low back, subsequent encounter   Reason for Exam: fell down stairs 2 weeks ago. pain in lower back, mostly   right side.       FINDINGS:   No evidence of acute fracture.  There is normal alignment.  No acute joint   abnormality.  No focal osseous lesion. No focal soft tissue abnormality.           Impression   Unremarkable lumbar spine.             There are no preventive care reminders to display for this patient.      Patient Active Problem List:     Pelvic pain in female     Seasonal allergies     Chronic post-traumatic stress disorder (PTSD)     Anxiety     Severe depression ([facility] [address]     Mild intermittent asthma without complication     Irregular menses     PCOS (polycystic ovarian syndrome)     Mirena IUD in place 11/17/20     Past Medical History:   Diagnosis Date    Allergic rhinitis     Anxiety     Asthma     Benign neoplasm of skin, site unspecified     Depression     PTSD (post-traumatic stress disorder)       Past Surgical History:   Procedure Laterality Date    INTRAUTERINE DEVICE INSERTION  11/17/2020    Mirena    LAPAROSCOPY  12-31-14    with peritoneal washings    WISDOM TOOTH EXTRACTION       Family History   Problem Relation Age of Onset    High Blood Pressure Mother     Kidney Disease Maternal Aunt     Heart Attack Maternal Aunt     Diabetes Maternal Grandmother     Ovarian Cancer Maternal Grandmother     Diabetes Maternal Grandfather     Diabetes Paternal Grandmother     Stroke Paternal Grandmother     Heart Attack Paternal Grandmother     Diabetes Paternal Grandfather     No Known Problems Father      Social History     Tobacco Use    Smoking status: Never Smoker    Smokeless tobacco: Never Used   Substance Use Topics    Alcohol use: Yes     Comment: social     ALLERGIES:    Allergies   Allergen Reactions    Flonase [Fluticasone] Hives          Subjective   Review of Systems   · Constitutional:  Negative for activity change, appetite change,unexpected weight change, chills, fever, and fatigue. · HENT: Negative for ear pain, sore throat,  Rhinorrhea, sinus pain, sinus pressure, congestion. · Eyes:  Negative for pain and discharge. · Respiratory:  Negative for chest tightness, shortness of breath, wheezing, and cough. · Cardiovascular:  Negative for chest pain, palpitations and leg swelling. · Gastrointestinal: Negative for abdominal pain, blood in stool, constipation,diarrhea, nausea and vomiting. · Endocrine: Negative for cold intolerance, heat intolerance, polydipsia, polyphagia and polyuria. · Genitourinary: Negative for difficulty urinating, dysuria, flank pain, frequency, hematuria and urgency. · Musculoskeletal: Negative for arthralgias, joint swelling, myalgias, neck pain and neck stiffness. Positive low back pain  · Skin: Negative for rash and wound.    · Allergic/Immunologic: Negative for Delsa Severance CERVIX   Final    C. trachomatis DNA 11/17/2020 NEGATIVE  NEGATIVE Final    Comment: CHLAMYDIA TRACHOMATIS DNA not detected by nucleic acid amplification. This test is intended for medical purposes only and is not valid for the evaluation of   suspected sexual abuse or for other forensic purposes. In certain contexts, culture may be required to meet applicable laws and regulations for   diagnosis of C. trachomatis and N. gonorrhoeae infections. Per 2014  CDC recommendations, this test does not include confirmation of positive results   by an alternative nucleic acid target.  N. gonorrhoeae DNA 11/17/2020 NEGATIVE  NEGATIVE Final    Comment: NEISSERIA GONORRHOEAE DNA not detected by nucleic acid amplification. This test is intended for medical purposes only and is not valid for the evaluation of   suspected sexual abuse or for other forensic purposes. In certain contexts, culture may be required to meet applicable laws and regulations for   diagnosis of C. trachomatis and N. gonorrhoeae infections. Per 2014  CDC recommendations, this test does not include confirmation of positive results   by an alternative nucleic acid target. No results found for this visit on 01/15/21. Completed Orders/Prescriptions   No orders of the defined types were placed in this encounter. AssessmentPlan/Medical Decision Making     1. Lumbar spine pain    - Advise to continue with PT  - Continue OTC motrin and tylenol  - Use Voltaren gel as needed for discomfort  - MRI LUMBAR SPINE WO CONTRAST; Future    2. Failure of outpatient treatment    - MRI LUMBAR SPINE WO CONTRAST; Future      Return for as scheduled. 1.  Ki received counseling on the following healthy behaviors: n/a  2. Patient given educational materials - see patient instructions  3. Was a self-tracking handout given in paper form or via [employer]? No  If yes, see orders or list here.   4.  Discussed use, benefit, and side effects of prescribed medications. Barriers to medication compliance addressed. All patient questions answered. Pt voiced understanding. 5.  Reviewed prior labs, imaging, consultation, follow up, and health maintenance  6. Continue current medications, diet and exercise. 7. Discussed use, benefit, and side effects of prescribed medications. Barriers to medication compliance addressed. All her questions were answered. Pt voiced understanding. Ki will continue current medications, diet and exercise. Of the 20 minute duration appointment visit, Anabela Betancourt CNP spent at least 50% of the face-to-face time in counseling, explanation of diagnosis, planning of further management, and answering all questions.           Signed:  Anabela Betancourt CNP

## 2021-01-21 ENCOUNTER — HOSPITAL ENCOUNTER (OUTPATIENT)
Dept: MRI IMAGING | Age: 26
Discharge: HOME | End: 2021-01-23
Payer: COMMERCIAL

## 2021-01-21 DIAGNOSIS — M54.50 LUMBAR SPINE PAIN: ICD-10-CM

## 2021-01-21 DIAGNOSIS — Z78.9 FAILURE OF OUTPATIENT TREATMENT: ICD-10-CM

## 2021-01-21 PROCEDURE — 72148 MRI LUMBAR SPINE W/O DYE: CPT

## 2021-03-16 ENCOUNTER — OFFICE VISIT (OUTPATIENT)
Dept: ORTHOPEDIC SURGERY | Age: 26
End: 2021-03-16
Payer: COMMERCIAL

## 2021-03-16 VITALS
HEART RATE: 83 BPM | DIASTOLIC BLOOD PRESSURE: 89 MMHG | SYSTOLIC BLOOD PRESSURE: 135 MMHG | TEMPERATURE: 97.3 F | BODY MASS INDEX: 44.16 KG/M2 | WEIGHT: 240 LBS | HEIGHT: 62 IN

## 2021-03-16 DIAGNOSIS — S86.012A STRAIN OF LEFT ACHILLES TENDON, INITIAL ENCOUNTER: Primary | ICD-10-CM

## 2021-03-16 PROCEDURE — G8417 CALC BMI ABV UP PARAM F/U: HCPCS | Performed by: FAMILY MEDICINE

## 2021-03-16 PROCEDURE — 99203 OFFICE O/P NEW LOW 30 MIN: CPT | Performed by: FAMILY MEDICINE

## 2021-03-16 PROCEDURE — G8484 FLU IMMUNIZE NO ADMIN: HCPCS | Performed by: FAMILY MEDICINE

## 2021-03-16 PROCEDURE — 1036F TOBACCO NON-USER: CPT | Performed by: FAMILY MEDICINE

## 2021-03-16 PROCEDURE — G8427 DOCREV CUR MEDS BY ELIG CLIN: HCPCS | Performed by: FAMILY MEDICINE

## 2021-03-16 NOTE — PROGRESS NOTES
Sports Medicine Consultation      CHIEF COMPLAINT:  Ankle Pain (Lt achilles. 3wks ago. extreme pain after sitting on couch. heard a pop.)  . HPI:   The patient is a 26 y.o. female who is being seen in  new patient being seen for regarding new problem  left foot/ankle pain. The patient states the pain has been present for 3 weeks. As far as trauma to the area, the patient indicates pop after getting up off the couch. There is  pain with weight bearing. The patient states numbness and tingling is not present. Catching and locking has not been present. She has tried tylenol, ibu, tall walking boot without relief. she has a past medical history of Allergic rhinitis, Anxiety, Asthma, Benign neoplasm of skin, site unspecified, Depression, and PTSD (post-traumatic stress disorder). she has a past surgical history that includes laparoscopy (12-31-14); [place] tooth extraction; and intrauterine device insertion (11/17/2020). family history includes Diabetes in her maternal grandfather, maternal grandmother, paternal grandfather, and paternal grandmother; Heart Attack in her maternal aunt and paternal grandmother; High Blood Pressure in her mother; Kidney Disease in her maternal aunt; No Known Problems in her father; Ovarian Cancer in her maternal grandmother; Stroke in her paternal grandmother. Social History     Socioeconomic History    Marital status: Single     Spouse name: Not on file    Number of children: Not on file    Years of education: Not on file    Highest education level: Not on file   Occupational History    Not on file   Social Needs    Financial resource strain: Hard    Food insecurity     Worry: Never true     Inability: Never true    Transportation needs     Medical: No     Non-medical: No   Tobacco Use    Smoking status: Never Smoker    Smokeless tobacco: Never Used   Substance and Sexual Activity    Alcohol use: Yes     Comment: social    Drug use:  Yes Types: Marijuana     Comment: hasn't used since august 2014    Sexual activity: Not Currently   Lifestyle    Physical activity     Days per week: Not on file     Minutes per session: Not on file    Stress: Not on file   Relationships    Social connections     Talks on phone: Not on file     Gets together: Not on file     Attends Judaism service: Not on file     Active member of club or organization: Not on file     Attends meetings of clubs or organizations: Not on file     Relationship status: Not on file    Intimate partner violence     Fear of current or ex partner: Not on file     Emotionally abused: Not on file     Physically abused: Not on file     Forced sexual activity: Not on file   Other Topics Concern    Not on file   Social History Narrative    Not on file       Current Outpatient Medications   Medication Sig Dispense Refill    diclofenac sodium (VOLTAREN) 1 % GEL Apply 4 g topically 4 times daily as needed for Pain 100 g 2    diclofenac sodium (VOLTAREN) 1 % GEL Apply 2 g topically 2 times daily 60 g 0    escitalopram (LEXAPRO) 10 MG tablet Take 10 mg by mouth daily      prazosin (MINIPRESS) 1 MG capsule Take 1 mg by mouth nightly      omeprazole 20 MG EC tablet Take 1 tablet by mouth daily for 20 days Take on empty stomach 30 minutes before meals 20 tablet 0    albuterol sulfate  (90 Base) MCG/ACT inhaler Inhale 2 puffs into the lungs every 6 hours as needed for Wheezing or Shortness of Breath 1 Inhaler 0    hydrOXYzine (ATARAX) 25 MG tablet Take 1 tablet by mouth 3 times daily as needed       Current Facility-Administered Medications   Medication Dose Route Frequency Provider Last Rate Last Admin    levonorgestrel (MIRENA) IUD 52 mg 1 each  1 each Intrauterine Continuous Peggye DO Fly   1 each at 11/17/20 1477       Allergies:  sheis allergic to flonase [fluticasone].     ROS:  CV:  Denies chest pain; palpitations; shortness of breath; swelling of feet, ankles; and loss of consciousness. CON: Denies fever and dizziness. ENT:  Denies hearing loss / ringing, ear infections hoarseness, and swallowing problems. RESP:  Denies chronic cough, spitting up blood, and asthma/wheezing. GI: Denies abdominal pain, change in bowel habits, nausea or vomiting, and blood in stools. :  Denies frequent urination, burning or painful urination, blood in the urine, and bladder incontinence. NEURO:  Denies headache, memory loss, sleep disturbance, and tremor or movement disorder. 11 system review of systems is otherwise negative unless noted in HPI    PHYSICAL EXAM:   /89 (Site: Right Lower Arm)   Pulse 83   Temp 97.3 °F (36.3 °C)   Ht 5' 2\" (1.575 m)   Wt 240 lb (108.9 kg)   BMI 43.90 kg/m²   GENERAL: Frantz Valle is a 26 y.o. female who is alert and oriented and sitting comfortably in our office. SKIN:  Intact without rashes, lesions or ulcerations. No obvious deformity or swelling. NEURO: Musculoskeletal and axillary nerves intact to sensory and motor testing. EYES:  Extraocular muscles intact. MOUTH: Oral mucosa moist.  No perioral lesions. PULM:  Respirations unlabored and regular. VASC:  Capillary refill less than 3 seconds. Distal pulses are palpable. There is no lymphadenopathy. Ankle Exam:    Reveals there is not effusion. Swelling is  present. Edema is  present. Ecchymoses is  present. Palpation-Tenderness insertional achilles  The foot is in planus alignment. ROM:  40 degrees plantarflexion and 20 degrees dorsiflexion. Subtalar motion is 30 degrees inversion and 20 degrees eversion. Strength-WNL  Sensation-normal to light touch  Special Tests-Ankle inversion: laxity negative  Ankle eversion: laxity negative  Ankle drawer: laxity negative  External rotation:negative  Syndesmotic Squeeze test: negative  The patient  Is notable to single toe raise.   Single leg hop test: unable to assess  Alric Farrier neg  Gait: Antalgic    PSYCH:  Patient has good fund of knowledge and displays understanding of exam.    RADIOLOGY: No results found. IMPRESSION:     1. Strain of left Achilles tendon, initial encounter        PLAN:   We discussed some of the etiologies and natural histories of     ICD-10-CM    1. Strain of left Achilles tendon, initial encounter  S86.012A     We discussed the various treatment alternatives including anti-inflammatory medications, physical therapy, injections, further imaging studies and as a last resort surgery. This point I think the patient just irritated her Achilles we did do a limited ultrasound evaluation of that heel which did demonstrate intact Achilles tendon we will treat her conservatively by placing her at maximum equinas for 2 weeks with topical anti-inflammatories at that time we will bring her out of the boot and do a course of physical therapy focus on eccentric treatment we will see her back otherwise in 2 weeks    Return in about 2 weeks (around 3/30/2021). Please be aware portions of this note were completed using voice recognition software and unforeseen errors may have occurred    Electronically signed by Jag Cam DO, FAOASM  on 3/16/21 at 3:22 PM EDT    No orders of the defined types were placed in this encounter.

## 2021-03-24 ENCOUNTER — OFFICE VISIT (OUTPATIENT)
Dept: FAMILY MEDICINE CLINIC | Age: 26
End: 2021-03-24
Payer: COMMERCIAL

## 2021-03-24 VITALS
WEIGHT: 253.6 LBS | HEART RATE: 97 BPM | SYSTOLIC BLOOD PRESSURE: 116 MMHG | DIASTOLIC BLOOD PRESSURE: 80 MMHG | TEMPERATURE: 97.2 F | BODY MASS INDEX: 46.38 KG/M2 | OXYGEN SATURATION: 99 %

## 2021-03-24 DIAGNOSIS — F32.2 SEVERE DEPRESSION (HCC): ICD-10-CM

## 2021-03-24 DIAGNOSIS — Z13.220 LIPID SCREENING: ICD-10-CM

## 2021-03-24 DIAGNOSIS — J45.20 MILD INTERMITTENT ASTHMA WITHOUT COMPLICATION: Primary | ICD-10-CM

## 2021-03-24 DIAGNOSIS — F41.9 ANXIETY: ICD-10-CM

## 2021-03-24 PROCEDURE — G8427 DOCREV CUR MEDS BY ELIG CLIN: HCPCS | Performed by: NURSE PRACTITIONER

## 2021-03-24 PROCEDURE — G8484 FLU IMMUNIZE NO ADMIN: HCPCS | Performed by: NURSE PRACTITIONER

## 2021-03-24 PROCEDURE — 1036F TOBACCO NON-USER: CPT | Performed by: NURSE PRACTITIONER

## 2021-03-24 PROCEDURE — 99213 OFFICE O/P EST LOW 20 MIN: CPT | Performed by: NURSE PRACTITIONER

## 2021-03-24 PROCEDURE — G8417 CALC BMI ABV UP PARAM F/U: HCPCS | Performed by: NURSE PRACTITIONER

## 2021-03-24 RX ORDER — TRAZODONE HYDROCHLORIDE 100 MG/1
100 TABLET ORAL NIGHTLY
COMMUNITY

## 2021-03-24 ASSESSMENT — PATIENT HEALTH QUESTIONNAIRE - PHQ9
2. FEELING DOWN, DEPRESSED OR HOPELESS: 0
SUM OF ALL RESPONSES TO PHQ QUESTIONS 1-9: 0
SUM OF ALL RESPONSES TO PHQ QUESTIONS 1-9: 0

## 2021-03-24 NOTE — PROGRESS NOTES
[place] [id number]  [phone]  [facility], [place]  [phone]  [phone]    Zachary Vivar is a 26 y.o. female who is here with c/o of:    Chief Complaint: Asthma (f/u)      Patient Accompanied by: n/a    HPI - Zachary Vivar is here today with c/o:    Patient here for re evaluation of chronic conditions. Anxiety/ Depression-    Follows with [facility]. She sees a counselor and psychiatrist. Reports mood is stable with medications. Denies SI or HI. Asthma-    Complaint with prn inhaler. Averages 1-2 times per month. Last PFT 2 years ago. Reports she only gets short of breath with vigorous activity or with colder temperatures.        Health Maintenance Due   Topic Date Due    COVID-19 Vaccine (1) Never done        Patient Active Problem List:     Pelvic pain in female     Seasonal allergies     Chronic post-traumatic stress disorder (PTSD)     Anxiety     Severe depression ([address]     Mild intermittent asthma without complication     Irregular menses     PCOS (polycystic ovarian syndrome)     Mirena IUD in place 11/17/20     Past Medical History:   Diagnosis Date    Allergic rhinitis     Anxiety     Asthma     Benign neoplasm of skin, site unspecified     Depression     PTSD (post-traumatic stress disorder)       Past Surgical History:   Procedure Laterality Date    INTRAUTERINE DEVICE INSERTION  11/17/2020    Mirena    LAPAROSCOPY  12-31-14    with peritoneal washings    WISDOM TOOTH EXTRACTION       Family History   Problem Relation Age of Onset    High Blood Pressure Mother     Kidney Disease Maternal Aunt     Heart Attack Maternal Aunt     Diabetes Maternal Grandmother     Ovarian Cancer Maternal Grandmother     Diabetes Maternal Grandfather     Diabetes Paternal Grandmother     Stroke Paternal Grandmother     Heart Attack Paternal Grandmother     Diabetes Paternal Grandfather     No Known Problems Father      Social History Tobacco Use    Smoking status: Never Smoker    Smokeless tobacco: Never Used   Substance Use Topics    Alcohol use: Yes     Comment: social     ALLERGIES:    Allergies   Allergen Reactions    Flonase [Fluticasone] Hives          Subjective   Review of Systems   · Constitutional:  Negative for activity change, appetite change,unexpected weight change, chills, fever, and fatigue. · HENT: Negative for ear pain, sore throat,  Rhinorrhea, sinus pain, sinus pressure, congestion. · Eyes:  Negative for pain and discharge. · Respiratory:  Negative for chest tightness, shortness of breath, wheezing, and cough. · Cardiovascular:  Negative for chest pain, palpitations and leg swelling. · Gastrointestinal: Negative for abdominal pain, blood in stool, constipation,diarrhea, nausea and vomiting. · Endocrine: Negative for cold intolerance, heat intolerance, polydipsia, polyphagia and polyuria. · Genitourinary: Negative for difficulty urinating, dysuria, flank pain, frequency, hematuria and urgency. · Musculoskeletal: Negative for arthralgias, back pain, joint swelling, myalgias, neck pain and neck stiffness. · Skin: Negative for rash and wound. · Allergic/Immunologic: Negative for environmental allergies and food allergies. · Neurological:  Negative for dizziness, light-headedness, numbness and headaches. · Hematological:  Negative for adenopathy. Does not bruise/bleed easily. · Psychiatric/Behavioral: Negative for self-injury, sleep disturbance and suicidal ideas. Objective   Physical Exam   PHYSICAL EXAM:   · Constitutional: Stormy Clunes is oriented to person, place, and time. Vital signs are normal. Appears well-developed and well-nourished. She is obese  · HEENT:   · Head: Normocephalic and atraumatic. Eyes:PERRL, EOMI, Conjunctiva normal, No discharge. · Neck: Full passive range of motion. Non-tender on palpation. Neck supple. No thyromegaly present.  Trachea normal.  · Cardiovascular: Normal rate, regular rhythm, S1, S2, no murmur, no gallop, no friction rub, intact distal pulses. · Pulmonary/Chest: Breath sounds are clear throughout, No respiratory distress, No wheezing, No chest tenderness. Effort normal  · Abdominal: Soft. Normal appearance, bowel sounds are present and normoactive. There is no hepatosplenomegaly. There is no tenderness. There is no CVA tenderness. · Musculoskeletal: Extremities appear regular and symmetric. No evident masses, lesions, foreign bodies, or other abnormalities. No edema. No tenderness on palpation. Joints are stable. Full ROM, strength and tone are within normal limits. · Neurological: Alert and oriented to person, place, and time. Normal motor function, Normal sensory function, No focal deficits noted. He has normal strength. · Skin: Skin is warm, dry and intact. No obvious lesions on exposed skin  · Psychiatric: Normal mood and affect. Speech is normal and behavior is normal.     Nursing note and vitals reviewed. Blood pressure 116/80, pulse 97, temperature 97.2 °F (36.2 °C), temperature source Temporal, weight 253 lb 9.6 oz (115 kg), SpO2 99 %. Body mass index is 46.38 kg/m². Wt Readings from Last 3 Encounters:   03/24/21 253 lb 9.6 oz (115 kg)   03/16/21 240 lb (108.9 kg)   01/15/21 252 lb (114.3 kg)     BP Readings from Last 3 Encounters:   03/24/21 116/80   03/16/21 135/89   01/15/21 100/60       Hospital Outpatient Visit on 11/17/2020   Component Date Value Ref Range Status    Specimen Description 11/17/2020 . CERVIX   Final    C. trachomatis DNA 11/17/2020 NEGATIVE  NEGATIVE Final    Comment: CHLAMYDIA TRACHOMATIS DNA not detected by nucleic acid amplification. This test is intended for medical purposes only and is not valid for the evaluation of   suspected sexual abuse or for other forensic purposes.   In certain contexts, culture may be required to meet applicable laws and regulations for   diagnosis of C. trachomatis and N. gonorrhoeae infections. Per 2014  CDC recommendations, this test does not include confirmation of positive results   by an alternative nucleic acid target.  N. gonorrhoeae DNA 11/17/2020 NEGATIVE  NEGATIVE Final    Comment: NEISSERIA GONORRHOEAE DNA not detected by nucleic acid amplification. This test is intended for medical purposes only and is not valid for the evaluation of   suspected sexual abuse or for other forensic purposes. In certain contexts, culture may be required to meet applicable laws and regulations for   diagnosis of C. trachomatis and N. gonorrhoeae infections. Per 2014  CDC recommendations, this test does not include confirmation of positive results   by an alternative nucleic acid target. No results found for this visit on 03/24/21. Completed Orders/Prescriptions   No orders of the defined types were placed in this encounter. AssessmentPlan/Medical Decision Making     1. Mild intermittent asthma without complication    - Continue prn use of albuterol  - Full PFT Study With Bronchodilator; Future    2. Anxiety    - Follows with [facility]    3. Severe depression ([facility] [address]    - Follows with [facility]    4. Lipid screening    - Lipid, Fasting; Future      Return in about 1 year (around 3/24/2022) for asthma. 1.  Ki received counseling on the following healthy behaviors: nutrition, exercise and medication adherence  2. Patient given educational materials - see patient instructions  3. Was a self-tracking handout given in paper form or via [employer]? No  If yes, see orders or list here. 4.  Discussed use, benefit, and side effects of prescribed medications. Barriers to medication compliance addressed. All patient questions answered. Pt voiced understanding. 5.  Reviewed prior labs, imaging, consultation, follow up, and health maintenance  6. Continue current medications, diet and exercise. 7. Discussed use, benefit, and side effects of prescribed medications.   Barriers to medication compliance addressed. All her questions were answered. Pt voiced understanding. Ki will continue current medications, diet and exercise. Of the 20 minute duration appointment visit, So Gomez CNP spent at least 50% of the face-to-face time in counseling, explanation of diagnosis, planning of further management, and answering all questions.           Signed:  So Gomez CNP

## 2021-03-30 ENCOUNTER — HOSPITAL ENCOUNTER (EMERGENCY)
Facility: CLINIC | Age: 26
Discharge: HOME | End: 2021-03-30
Attending: EMERGENCY MEDICINE
Payer: COMMERCIAL

## 2021-03-30 VITALS
SYSTOLIC BLOOD PRESSURE: 134 MMHG | BODY MASS INDEX: 45.64 KG/M2 | TEMPERATURE: 98.6 F | OXYGEN SATURATION: 97 % | HEART RATE: 85 BPM | DIASTOLIC BLOOD PRESSURE: 91 MMHG | RESPIRATION RATE: 16 BRPM | WEIGHT: 248 LBS | HEIGHT: 62 IN

## 2021-03-30 DIAGNOSIS — J02.0 STREP PHARYNGITIS: Primary | ICD-10-CM

## 2021-03-30 LAB
DIRECT EXAM: ABNORMAL
Lab: ABNORMAL
SPECIMEN DESCRIPTION: ABNORMAL

## 2021-03-30 PROCEDURE — 99285 EMERGENCY DEPT VISIT HI MDM: CPT

## 2021-03-30 PROCEDURE — 6370000000 HC RX 637 (ALT 250 FOR IP): Performed by: EMERGENCY MEDICINE

## 2021-03-30 PROCEDURE — 87880 STREP A ASSAY W/OPTIC: CPT

## 2021-03-30 RX ORDER — PENICILLIN V POTASSIUM 250 MG/1
500 TABLET ORAL ONCE
Status: COMPLETED | OUTPATIENT
Start: 2021-03-30 | End: 2021-03-30

## 2021-03-30 RX ORDER — PENICILLIN V POTASSIUM 500 MG/1
500 TABLET ORAL 4 TIMES DAILY
Qty: 28 TABLET | Refills: 0 | Status: SHIPPED | OUTPATIENT
Start: 2021-03-30 | End: 2021-04-06

## 2021-03-30 RX ADMIN — PENICILLIN V POTASSIUM 500 MG: 250 TABLET, FILM COATED ORAL at 03:10

## 2021-03-30 ASSESSMENT — PAIN DESCRIPTION - FREQUENCY: FREQUENCY: CONTINUOUS

## 2021-03-30 ASSESSMENT — PAIN DESCRIPTION - LOCATION: LOCATION: THROAT

## 2021-03-30 ASSESSMENT — PAIN DESCRIPTION - DESCRIPTORS: DESCRIPTORS: BURNING

## 2021-03-30 ASSESSMENT — PAIN SCALES - GENERAL: PAINLEVEL_OUTOF10: 7

## 2021-03-30 NOTE — LETTER
[facility] ED  [address]  Phone: [phone]             March 30, 2021    Patient: Geoffrey Cortes   YOB: 1995   Date of Visit: 3/30/2021       To Whom It May Concern:    Param Young was seen and treated in our emergency department on 3/30/2021.  She may return to work on 4-1-21  Sincerely,             Signature:__________________________________

## 2021-03-30 NOTE — ED PROVIDER NOTES
eMERGENCY dEPARTMENT eNCOUnter      Pt Name: Elvis Thompson  MRN: [id number]  [place] 1995  Date of evaluation: 3/30/2021      CHIEF COMPLAINT       Chief Complaint   Patient presents with    Pharyngitis         HISTORY OF PRESENT ILLNESS    Elvis Thompson is a 26 y.o. female who presents with sore throat. Patient states she started with a sore throat yesterday got worse last night comes in for evaluation she denies any fever chills cough runny nose states it hurts to swallow but is able to handle secretions eat and drink        REVIEW OF SYSTEMS       Positive for sore throat negative for fever chills chest pain cough runny nose    PAST MEDICAL HISTORY    has a past medical history of Allergic rhinitis, Anxiety, Asthma, Benign neoplasm of skin, site unspecified, Depression, and PTSD (post-traumatic stress disorder). SURGICAL HISTORY      has a past surgical history that includes laparoscopy (12-31-14); [place] tooth extraction; and intrauterine device insertion (11/17/2020). CURRENT MEDICATIONS       Discharge Medication List as of 3/30/2021  3:04 AM      CONTINUE these medications which have NOT CHANGED    Details   traZODone (DESYREL) 50 MG tablet Take 50 mg by mouth nightlyHistorical Med      escitalopram (LEXAPRO) 20 MG tablet Take 20 mg by mouth daily Historical Med      prazosin (MINIPRESS) 2 MG capsule Take 2 mg by mouth nightly Historical Med      albuterol sulfate  (90 Base) MCG/ACT inhaler Inhale 2 puffs into the lungs every 6 hours as needed for Wheezing or Shortness of Breath, Disp-1 Inhaler,R-0Normal      hydrOXYzine (ATARAX) 50 MG tablet Take 50 mg by mouth daily Historical Med      diclofenac sodium (VOLTAREN) 1 % GEL Apply 2 g topically 2 times daily, Topical, 2 TIMES DAILY Starting Mon 12/14/2020, Disp-60 g, R-0, Normal             ALLERGIES     is allergic to flonase [fluticasone]. FAMILY HISTORY     She indicated that her mother is alive.  She indicated that the status of her father is unknown. She indicated that the status of her maternal grandmother is unknown. She indicated that the status of her maternal grandfather is unknown. She indicated that the status of her paternal grandmother is unknown. She indicated that the status of her paternal grandfather is unknown. She indicated that the status of her maternal aunt is unknown.     family history includes Diabetes in her maternal grandfather, maternal grandmother, paternal grandfather, and paternal grandmother; Heart Attack in her maternal aunt and paternal grandmother; High Blood Pressure in her mother; Kidney Disease in her maternal aunt; No Known Problems in her father; Ovarian Cancer in her maternal grandmother; Stroke in her paternal grandmother. SOCIAL HISTORY      reports that she has never smoked. She has never used smokeless tobacco. She reports current alcohol use. She reports current drug use. Drug: Marijuana. PHYSICAL EXAM     INITIAL VITALS:  height is 5' 2\" (1.575 m) and weight is 112.5 kg (248 lb). Her oral temperature is 98.6 °F (37 °C). Her blood pressure is 134/91 (abnormal) and her pulse is 85. Her respiration is 16 and oxygen saturation is 97%.       Dental: Patient alert nontoxic-appearing female no apparent distress  HEENT: Head is atraumatic conjunctiva are clear oromucosa is pink and moist posterior pharynx shows some erythema but no exudative material handling secretions well  Neck: Supple no meningismus no significant anterior chain lymphadenopathy  Respiratory: Lung sounds are clear bilateral  Cardiac: Heart is regular rate and rhythm    DIFFERENTIAL DIAGNOSIS/ MDM:     Pharyngitis obtain strep    DIAGNOSTIC RESULTS     EKG: All EKG's are interpreted by the Emergency Department Physician who either signs or Co-signs this chart in the absence of a cardiologist.        RADIOLOGY:   I directly visualized the following  images and reviewed the radiologist interpretations:  No orders to display

## 2021-04-01 ENCOUNTER — TELEPHONE (OUTPATIENT)
Dept: FAMILY MEDICINE CLINIC | Age: 26
End: 2021-04-01

## 2021-04-09 ENCOUNTER — OFFICE VISIT (OUTPATIENT)
Dept: ORTHOPEDIC SURGERY | Age: 26
End: 2021-04-09
Payer: COMMERCIAL

## 2021-04-09 ENCOUNTER — HOSPITAL ENCOUNTER (OUTPATIENT)
Dept: MRI IMAGING | Age: 26
Discharge: HOME | End: 2021-04-11
Payer: COMMERCIAL

## 2021-04-09 VITALS — BODY MASS INDEX: 45.64 KG/M2 | WEIGHT: 248 LBS | RESPIRATION RATE: 12 BRPM | HEIGHT: 62 IN

## 2021-04-09 DIAGNOSIS — M76.62 ACHILLES TENDINITIS OF LEFT LOWER EXTREMITY: ICD-10-CM

## 2021-04-09 DIAGNOSIS — S86.012D STRAIN OF LEFT ACHILLES TENDON, SUBSEQUENT ENCOUNTER: ICD-10-CM

## 2021-04-09 DIAGNOSIS — M76.62 ACHILLES TENDINITIS OF LEFT LOWER EXTREMITY: Primary | ICD-10-CM

## 2021-04-09 PROCEDURE — 99213 OFFICE O/P EST LOW 20 MIN: CPT | Performed by: ORTHOPAEDIC SURGERY

## 2021-04-09 PROCEDURE — 1036F TOBACCO NON-USER: CPT | Performed by: ORTHOPAEDIC SURGERY

## 2021-04-09 PROCEDURE — G8427 DOCREV CUR MEDS BY ELIG CLIN: HCPCS | Performed by: ORTHOPAEDIC SURGERY

## 2021-04-09 PROCEDURE — G8417 CALC BMI ABV UP PARAM F/U: HCPCS | Performed by: ORTHOPAEDIC SURGERY

## 2021-04-09 PROCEDURE — 73721 MRI JNT OF LWR EXTRE W/O DYE: CPT

## 2021-04-09 NOTE — PROGRESS NOTES
[facility]  [address] [address]  Dept: [phone]    Ambulatory Orthopedic Consult      CHIEF COMPLAINT:    Chief Complaint   Patient presents with    Ankle Pain     Left       HISTORY OF PRESENT ILLNESS:      The patient is a 26 y.o. female who is being seen for consultation and evaluation of pain at the posterior aspect of the left Achilles tendon/heel, which began atraumatically in 2018. The pain is described mainly with mechanical terms (dull/sharp/throbbing). The pain is worse with activity and better with rest. The patient reports a progressive course. The patient has tried:      [x]  rest/activity modification          [x]  NSAIDs      []  opiates      []  orthotics        []  change in shoes   []  home exercises  []  physical therapy      []  CAM boot     []  brace:    []  injection:       []  surgery:      INTERVAL HISTORY 12/11/2020:  She is seen again today in the office for follow up of a previous issue (as above). Since being seen last, the patient is doing better. She is ambulating today using a CAM boot. The location and quality of the pain have not significantly changed since the last visit. INTERVAL HISTORY 4/9/2021:  She is seen again today in the office for follow up of a previous issue (as above). Since being seen last, the patient is doing worse. She is ambulating today using a CAM boot. History is obtained through the EMR today, as well as directly from the patient. Around 2/23/2021, the patient felt extreme pain and experienced a pop upon getting up from the couch, and she was seen by our sports medicine doctor, Dr. Alex Lynn, after that incidence. REVIEW OF SYSTEMS:  Constitutional: Negative for fever. HENT: Negative for tinnitus. Eyes: Negative for pain. Respiratory: Negative for shortness of breath. Cardiovascular: Negative for chest pain.    Gastrointestinal: Negative for nodules/thickening of the tendon palpated, no palpable gap of the Achilles tendon palpated      RADIOLOGY:   4/9/2021 No new radiology images today. Prior images reviewed for reference. FINDINGS:  Three weightbearing views (AP, Mortise, and Lateral) of the left ankle and three weightbearing views (AP, Oblique, Lateral) of the left foot were obtained in the office today and reviewed, revealing no acute fracture, dislocation, or radioopaque foreign body/tumor. The ankle mortise is maintained with no widening of the clear spaces. Overall alignment is satisfactory. IMPRESSION:  No acute fracture/dislocation. Electronically signed by Owen Jones MD      ASSESSMENT AND PLAN:  Ayala Squires was seen today for Ankle Pain (Left)  The primary encounter diagnosis was Achilles tendinitis of left lower extremity. A diagnosis of Strain of left Achilles tendon, subsequent encounter was also pertinent to this visit. Body mass index is 45.36 kg/m². She has a history of left Achilles insertional tendinitis, with a history of a possible partial rupture versus strain, sustained on 2/23/2021. Notably, she has the past medical history as above, including but not limited to the following:  Asthma, anxiety/PTSD/depression. I had another discussion today with the patient about the likely diagnosis and its natural history, physical exam and imaging findings, as well as treatment options in detail. Surgically, we discussed a possible future debridement and/or repair, depending on her clinical course, as well as her imaging ordered today as below. Orders/referrals were placed as below at today's visit. She will continue to use her cam boot with heel lifts, and she may weight-bear as tolerated, avoiding pain provoking activity in the process. We again discussed the risk of rupture and/or completing a tear, and verbal understanding was expressed. She may use her previously prescribed NSAIDs as needed.     In order to know exactly how to proceed with treatment (surgical versus nonsurgical, as well as how), an MRI was ordered today to evaluate the Achilles tendon. This is medically necessary to evaluate the structures in this area, for both diagnosis and treatment. All questions were answered and the patient agrees with the above plan. The patient will return to clinic after the MRI without x-rays. No follow-ups on file. No orders of the defined types were placed in this encounter. Orders Placed This Encounter   Procedures    MRI ANKLE LEFT WO CONTRAST     MUST BE SCHEDULED AT [facility]  MUST BE DONE ON 3T MAGNET OR GREATER  Please have read by [facility] Radiologist     Standing Status:   Future     Standing Expiration Date:   4/9/2022     Order Specific Question:   Reason for exam:     Answer:   eval achilles tendon         Celia Quesada MD  Orthopedic Surgery, Foot and Ankle        Please excuse any typos/errors, as this note was created with the assistance of voice recognition software. While intending to generate a document that actually reflects the content of the visit, the document can still have some errors including those of syntax and sound-a-like substitutions which may escape proof reading. In such instances, actual meaning can be extrapolated by context.

## 2021-04-20 ENCOUNTER — HOSPITAL ENCOUNTER (EMERGENCY)
Age: 26
Discharge: HOME | End: 2021-04-20
Attending: EMERGENCY MEDICINE
Payer: COMMERCIAL

## 2021-04-20 VITALS
SYSTOLIC BLOOD PRESSURE: 137 MMHG | RESPIRATION RATE: 18 BRPM | OXYGEN SATURATION: 98 % | DIASTOLIC BLOOD PRESSURE: 81 MMHG | HEART RATE: 97 BPM
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
## 2021-04-20 PROCEDURE — 99282 EMERGENCY DEPT VISIT SF MDM: CPT

## 2021-04-20 RX ORDER — LIDOCAINE 50 MG/G
1 PATCH TOPICAL DAILY
Qty: 10 PATCH | Refills: 0 | Status: SHIPPED | OUTPATIENT
Start: 2021-04-20 | End: 2021-04-30

## 2021-04-20 RX ORDER — LIDOCAINE 4 G/G
1 PATCH TOPICAL DAILY
Status: DISCONTINUED | OUTPATIENT
Start: 2021-04-20 | End: 2021-04-20 | Stop reason: HOSPADM

## 2021-04-20 ASSESSMENT — ENCOUNTER SYMPTOMS
VOMITING: 0
SORE THROAT: 0
DIARRHEA: 0
TROUBLE SWALLOWING: 0
ABDOMINAL PAIN: 0
WHEEZING: 0
ABDOMINAL DISTENTION: 0
RHINORRHEA: 0
PHOTOPHOBIA: 0
CONSTIPATION: 0
CHEST TIGHTNESS: 0
NAUSEA: 0
BACK PAIN: 0
SHORTNESS OF BREATH: 0
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
## 2021-04-30 ENCOUNTER — OFFICE VISIT (OUTPATIENT)
Dept: ORTHOPEDIC SURGERY | Age: 26
End: 2021-04-30
Payer: COMMERCIAL

## 2021-04-30 VITALS — WEIGHT: 248 LBS | BODY MASS INDEX: 45.64 KG/M2 | HEIGHT: 62 IN | RESPIRATION RATE: 12 BRPM

## 2021-04-30 DIAGNOSIS — M76.62 ACHILLES TENDINITIS OF LEFT LOWER EXTREMITY: Primary | ICD-10-CM

## 2021-04-30 PROCEDURE — 1036F TOBACCO NON-USER: CPT | Performed by: ORTHOPAEDIC SURGERY

## 2021-04-30 PROCEDURE — G8427 DOCREV CUR MEDS BY ELIG CLIN: HCPCS | Performed by: ORTHOPAEDIC SURGERY

## 2021-04-30 PROCEDURE — 99213 OFFICE O/P EST LOW 20 MIN: CPT | Performed by: ORTHOPAEDIC SURGERY

## 2021-04-30 PROCEDURE — G8417 CALC BMI ABV UP PARAM F/U: HCPCS | Performed by: ORTHOPAEDIC SURGERY

## 2021-04-30 NOTE — PROGRESS NOTES
[facility]  [facility] [address]  Dept: [phone]    Ambulatory Orthopedic Consult      CHIEF COMPLAINT:    Chief Complaint   Patient presents with    Ankle Pain     Left       HISTORY OF PRESENT ILLNESS:      The patient is a 26 y.o. female who is being seen for consultation and evaluation of pain at the posterior aspect of the left Achilles tendon/heel, which began atraumatically in 2018. The pain is described mainly with mechanical terms (dull/sharp/throbbing). The pain is worse with activity and better with rest. The patient reports a progressive course. The patient has tried:      [x]  rest/activity modification          [x]  NSAIDs      []  opiates      []  orthotics        []  change in shoes   []  home exercises  []  physical therapy      []  CAM boot     []  brace:    []  injection:       []  surgery:      INTERVAL HISTORY 12/11/2020:  She is seen again today in the office for follow up of a previous issue (as above). Since being seen last, the patient is doing better. She is ambulating today using a CAM boot. The location and quality of the pain have not significantly changed since the last visit. INTERVAL HISTORY 4/9/2021:  She is seen again today in the office for follow up of a previous issue (as above). Since being seen last, the patient is doing worse. She is ambulating today using a CAM boot. History is obtained through the EMR today, as well as directly from the patient. Around 2/23/2021, the patient felt extreme pain and experienced a pop upon getting up from the couch, and she was seen by our sports medicine doctor, Dr. Ary Cobb, after that incidence. INTERVAL HISTORY 4/30/2021:  She is seen again today in the office for follow up of imaging as below. Since being seen last, the patient is doing about the same overall. At today's visit, she is using a brace/boot.     History is obtained today from:   [x]  the patient     [x]  EMR     []  one family member/friend    []  multiple family members/friends    []  other:        REVIEW OF SYSTEMS:  Constitutional: Negative for fever. HENT: Negative for tinnitus. Eyes: Negative for pain. Respiratory: Negative for shortness of breath. Cardiovascular: Negative for chest pain. Gastrointestinal: Negative for abdominal pain. Genitourinary: Negative for dysuria. Skin: Negative for rash. Neurological: Negative for headaches. Hematological: Does not bruise/bleed easily. Musculoskeletal: See HPI for pertinent positives     Past Medical History:    She  has a past medical history of Allergic rhinitis, Anxiety, Asthma, Benign neoplasm of skin, site unspecified, Depression, and PTSD (post-traumatic stress disorder). Past Surgical History:    She  has a past surgical history that includes laparoscopy (12-31-14); [place] tooth extraction; and intrauterine device insertion (11/17/2020).      Current Medications:     Current Outpatient Medications:     lidocaine (LIDODERM) 5 %, Place 1 patch onto the skin daily for 10 days 12 hours on, 12 hours off., Disp: 10 patch, Rfl: 0    traZODone (DESYREL) 50 MG tablet, Take 50 mg by mouth nightly, Disp: , Rfl:     diclofenac sodium (VOLTAREN) 1 % GEL, Apply 2 g topically 2 times daily, Disp: 60 g, Rfl: 0    escitalopram (LEXAPRO) 20 MG tablet, Take 20 mg by mouth daily , Disp: , Rfl:     prazosin (MINIPRESS) 2 MG capsule, Take 2 mg by mouth nightly , Disp: , Rfl:     albuterol sulfate  (90 Base) MCG/ACT inhaler, Inhale 2 puffs into the lungs every 6 hours as needed for Wheezing or Shortness of Breath, Disp: 1 Inhaler, Rfl: 0    hydrOXYzine (ATARAX) 50 MG tablet, Take 50 mg by mouth daily , Disp: , Rfl:      Allergies:    Flonase [fluticasone]    Family History:  family history includes Diabetes in her maternal grandfather, maternal grandmother, paternal grandfather, and paternal grandmother; Heart Attack in her maternal aunt and paternal grandmother; High Blood Pressure in her mother; Kidney Disease in her maternal aunt; No Known Problems in her father; Ovarian Cancer in her maternal grandmother; Stroke in her paternal grandmother. 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 No significant swelling of foot. Skin: Intact without rash/lesions/AV malformations. Strength: Able to fire/perform the following with appropriate strength:    [x]  Tib Ant:     [x]  Gastroc-Soleus:         [x]  Inversion:    [x]  Eversion:         [x]  FHL:     [x]  EHL:      Motion:  Normal for the following joints:    [x]  Ankle:      [x]  Subtalar:        [x]  1st MTP:      []  1st TMT:            Tenderness to Palpation:    Tenderness to palpation: None  -no nodules/thickening of Achilles tendon palpated  -no palpable gap of Achilles tendon noted  -no pain with resisted plantarflexion      LLE:  Alignment:  Heel valgus pes planus  Vascular: Toes warm and well perfused, compartments soft/compressible. No significant swelling of foot.   Skin: Intact without rash/lesions/AV malformations. Strength: Able to fire/perform the following with appropriate strength:    [x]  Tib Ant:     [x]  Gastroc-Soleus:         [x]  Inversion:    [x]  Eversion:         [x]  FHL:     [x]  EHL:      Motion:  Normal for the following joints:    [x]  Ankle:      [x]  Subtalar:        [x]  1st MTP:      []  1st TMT:            Tenderness to Palpation:    Tenderness to palpation: Distal Achilles tendon greater than Achilles tendon insertion  -Pain with resisted plantarflexion, 4+/5 on manual muscle testing  -No nodules/thickening of the tendon palpated, no palpable gap of the Achilles tendon palpated      RADIOLOGY:   4/30/2021 Prior images reviewed for reference. MRI images and radiology report reviewed, as below:    Normal appearance of the Achilles tendon with very mild marrow edema at its   calcaneal insertion that may represent stress related changes.          -Independent interpretation: Subtle edema adjacent to the insertion of the Achilles tendon, possibly representing very small undersurface tearing. FINDINGS:  Three weightbearing views (AP, Mortise, and Lateral) of the left ankle and three weightbearing views (AP, Oblique, Lateral) of the left foot were obtained in the office today and reviewed, revealing no acute fracture, dislocation, or radioopaque foreign body/tumor. The ankle mortise is maintained with no widening of the clear spaces. Overall alignment is satisfactory. IMPRESSION:  No acute fracture/dislocation. Electronically signed by Nir Mustafa MD      ASSESSMENT AND PLAN:    Body mass index is 45.36 kg/m². She has a history of left Achilles insertional tendinitis, as well as a history of an exacerbation that was concerning for a partial rupture, sustained on 2/23/2021. MRI shows both mild marrow edema at the calcaneal insertion as well as subtle edema adjacent to the insertion of the Achilles tendon, possibly representing very small undersurface tear.     Notably, she has the past medical history as above, including but not limited to the following:  Asthma, anxiety/PTSD/depression. I had another discussion today with the patient about the likely diagnosis and its natural history, physical exam and imaging findings, as well as treatment options in detail. Surgically, I did not recommend any surgical intervention at this time, and we discussed continuing conservative management. Orders/referrals were placed as below at today's visit. She may continue to weight-bear as tolerated, and we discussed weaning out of the cam boot into a regular shoe, and using heel lifts as needed to avoid pain provoking activity. The patient was again referred to physical therapy for my Achilles tendinopathy protocol. We have discussed the risk of rupture on multiple occasions. She may continue to use her previously prescribed NSAIDs as needed. All questions were answered and the patient agrees with the above plan. The patient will return to clinic in 3 months as needed without x-rays. In the future, we may consider topical anesthetics. Return in about 3 months (around 7/30/2021), or if symptoms worsen or fail to improve. No orders of the defined types were placed in this encounter. Orders Placed This Encounter   Procedures    Ambulatory referral to Physical Therapy     Referral Priority:   Routine     Referral Type:   Eval and Treat     Referral Reason:   Specialty Services Required     Requested Specialty:   Physical Therapy     Number of Visits Requested:   1         Sedonia Schilder, MD  Orthopedic Surgery, Foot and Ankle        Please excuse any typos/errors, as this note was created with the assistance of voice recognition software. While intending to generate a document that actually reflects the content of the visit, the document can still have some errors including those of syntax and sound-a-like substitutions which may escape proof reading.  In such instances, actual meaning can be extrapolated by context.

## 2021-05-04 ENCOUNTER — OFFICE VISIT (OUTPATIENT)
Dept: OBGYN CLINIC | Age: 26
End: 2021-05-04
Payer: COMMERCIAL

## 2021-05-04 ENCOUNTER — HOSPITAL ENCOUNTER (OUTPATIENT)
Age: 26
Setting detail: SPECIMEN
Discharge: HOME | End: 2021-05-04
Payer: COMMERCIAL

## 2021-05-04 VITALS
HEIGHT: 62 IN | WEIGHT: 250 LBS | BODY MASS INDEX: 46.01 KG/M2 | SYSTOLIC BLOOD PRESSURE: 112 MMHG | DIASTOLIC BLOOD PRESSURE: 82 MMHG

## 2021-05-04 DIAGNOSIS — Z11.3 SCREENING FOR STDS (SEXUALLY TRANSMITTED DISEASES): ICD-10-CM

## 2021-05-04 DIAGNOSIS — Z30.431 SURVEILLANCE FOR BIRTH CONTROL, INTRAUTERINE DEVICE: ICD-10-CM

## 2021-05-04 DIAGNOSIS — Z30.431 SURVEILLANCE FOR BIRTH CONTROL, INTRAUTERINE DEVICE: Primary | ICD-10-CM

## 2021-05-04 DIAGNOSIS — R10.9 ABDOMINAL CRAMPING: ICD-10-CM

## 2021-05-04 DIAGNOSIS — Z30.431 CHECKING OF INTRAUTERINE DEVICE: ICD-10-CM

## 2021-05-04 LAB
DIRECT EXAM: ABNORMAL
Lab: ABNORMAL
SPECIMEN DESCRIPTION: ABNORMAL

## 2021-05-04 PROCEDURE — 1036F TOBACCO NON-USER: CPT | Performed by: STUDENT IN AN ORGANIZED HEALTH CARE EDUCATION/TRAINING PROGRAM

## 2021-05-04 PROCEDURE — 99213 OFFICE O/P EST LOW 20 MIN: CPT | Performed by: STUDENT IN AN ORGANIZED HEALTH CARE EDUCATION/TRAINING PROGRAM

## 2021-05-04 PROCEDURE — G8417 CALC BMI ABV UP PARAM F/U: HCPCS | Performed by: STUDENT IN AN ORGANIZED HEALTH CARE EDUCATION/TRAINING PROGRAM

## 2021-05-04 PROCEDURE — G8427 DOCREV CUR MEDS BY ELIG CLIN: HCPCS | Performed by: STUDENT IN AN ORGANIZED HEALTH CARE EDUCATION/TRAINING PROGRAM

## 2021-05-04 NOTE — PROGRESS NOTES
[facility]   [facility] [address] [facility], [place] [address]    Problem Visit      Benjamin Dawkins  2021                       Primary Care Physician: ARIAN Echavarria CNP    CC:   Chief Complaint   Patient presents with    Abdominal Cramping     iud chk    Sexually Transmitted Diseases         HPI: Ania Lyons is a 26 y.o. female  No LMP recorded. Patient has had an implant. The patient was seen and examined. She is here for STD check and is complaining of mild cramping and spotting with IUD. Patient would also like to be checked for any sexually transmitted diseases just to make sure that everything is normal.  Patient has tried Motrin for her cramping and this resolves her pain.       REVIEW OF SYSTEMS:  Constitutional: negative fever, negative chills  HEENT: negative visual disturbances, negative headaches  Respiratory: negative dyspnea, negative cough  Cardiovascular: negative chest pain,  negative palpitations  Gastrointestinal: negative abdominal pain, negative RUQ pain, negative N/V, negative diarrhea, negative constipation, positive cramping  Genitourinary: negative dysuria, negative vaginal discharge, positive spotting  Dermatological: negative rash  Hematologic: negative bruising  Immunologic/Lymphatic: negative recent illness, negative recent sick contact  Musculoskeletal: negative back pain, negative myalgias, negative arthralgias  Neurological:  negative dizziness, negative weakness  Behavior/Psych: negative depression, negative anxiety    OBSTETRICAL HISTORY:  OB History    Para Term  AB Living   0 0 0 0 0 0   SAB TAB Ectopic Molar Multiple Live Births   0 0 0 0 0 0       PAST MEDICAL HISTORY:      Diagnosis Date    Allergic rhinitis     Anxiety     Asthma     Benign neoplasm of skin, site unspecified     Depression     PTSD (post-traumatic stress disorder)        PAST SURGICAL HISTORY: bruits  Breast:  (Chest): N/A  Abdomen: soft, non-tender, non-distended, no right upper quadrant tenderness and no CVA tenderness  Pelvic Exam:   External genitalia: General appearance; normal, Hair distribution; normal, Lesions absent  Urinary system: urethral meatus normal  Vaginal: normal mucosa, thin grey discharge  Cervix: normal appearing cervix without lesions, IUD strings visualized  Rectal Exam: exam declined by patient  Musculoskeletal: no gross abnormalities  Extremities: non-tender BLE and non-edematous  Psych:  oriented to time, place and person       DATA:  No results found for this visit on 21. ASSESSMENT & PLAN:    Steven Zaldivar is a 26 y.o. female  No LMP recorded. Patient has had an implant. Cramping/spotting with IUD   - IUD strings visualized   - GYN US to assess correct position of IUD    STD check   - Vag probe and GC ordered   - Will treat based on results     Patient Active Problem List    Diagnosis Date Noted    Mirena IUD in place 20     Due out 26      PCOS (polycystic ovarian syndrome) 2020    Irregular menses 2020    Anxiety 09/15/2020    Severe depression ([facility] [address] 09/15/2020    Mild intermittent asthma without complication     Chronic post-traumatic stress disorder (PTSD) 2019    Seasonal allergies 2017    Pelvic pain in female 2014       Return in about 2 weeks (around 2021) for [address] with Esdras Medina. Counseling Completed:    Discussed need for repeat pap as per American Society for Colposcopy and Cervical Pathology guidelines. Discussed need for mammograms every 1 year, If >42 yo and last mammogram was negative. Discussed Calcium and Vitamin D dosing. Discussed need for colonoscopy screening as well as onset for bone density testing. Discussed birth control and barrier recommendations. Discussed STD counseling and prevention.   Discussed Gardisil counseling for all patients 9-45 yo.  Hereditary Breast, Ovarian, Colon and Uterine Cancer screening discussed. Tobacco & Secondary smoke risks discussed; with recommendation for cessation and avoidance. Routine health maintenance per patients PCP discussed. Patient was seen with total face to face time of 15 minutes. More than 50% of this visit was on counseling and education regarding her diagnose(s) as listed below and options. She was also counseled on her preventative health maintenance recommendations and follow-up. Diagnosis Orders   1. Surveillance for birth control, intrauterine device  VAGINITIS DNA PROBE    Chlamydia Trachomatis & Neisseria gonorrhoeae (GC) by amplified detection   2. Screening for STDs (sexually transmitted diseases)  VAGINITIS DNA PROBE    Chlamydia Trachomatis & Neisseria gonorrhoeae (GC) by amplified detection   3. Abdominal cramping  VAGINITIS DNA PROBE    Chlamydia Trachomatis & Neisseria gonorrhoeae (GC) by amplified detection   4.  Checking of intrauterine device  US PELVIS COMPLETE NON-OB TRANSABDOMINAL AND TRANSVAGINAL         Megan Mar [address] OB/[facility]N, [facility]  5/4/2021, 9:08 AM

## 2021-05-05 DIAGNOSIS — B96.89 BACTERIAL VAGINITIS: Primary | ICD-10-CM

## 2021-05-05 DIAGNOSIS — N76.0 BACTERIAL VAGINITIS: Primary | ICD-10-CM

## 2021-05-05 RX ORDER — METRONIDAZOLE 500 MG/1
500 TABLET ORAL 2 TIMES DAILY
Qty: 14 TABLET | Refills: 0 | Status: SHIPPED | OUTPATIENT
Start: 2021-05-05 | End: 2021-05-12

## 2021-07-08 ENCOUNTER — OFFICE VISIT (OUTPATIENT)
Dept: OBGYN CLINIC | Age: 26
End: 2021-07-08
Payer: COMMERCIAL

## 2021-07-08 ENCOUNTER — HOSPITAL ENCOUNTER (OUTPATIENT)
Age: 26
Setting detail: SPECIMEN
Discharge: HOME | End: 2021-07-08
Payer: COMMERCIAL

## 2021-07-08 VITALS
SYSTOLIC BLOOD PRESSURE: 112 MMHG | BODY MASS INDEX: 46.74 KG/M2 | HEIGHT: 62 IN | DIASTOLIC BLOOD PRESSURE: 72 MMHG | WEIGHT: 254 LBS

## 2021-07-08 DIAGNOSIS — Z11.3 SCREENING FOR STDS (SEXUALLY TRANSMITTED DISEASES): Primary | ICD-10-CM

## 2021-07-08 DIAGNOSIS — N89.8 VAGINAL DISCHARGE: ICD-10-CM

## 2021-07-08 DIAGNOSIS — R10.2 PELVIC PAIN IN FEMALE: ICD-10-CM

## 2021-07-08 DIAGNOSIS — R10.9 ABDOMINAL CRAMPING: ICD-10-CM

## 2021-07-08 DIAGNOSIS — Z11.3 SCREENING FOR STDS (SEXUALLY TRANSMITTED DISEASES): ICD-10-CM

## 2021-07-08 LAB
DIRECT EXAM: ABNORMAL
Lab: ABNORMAL
SPECIMEN DESCRIPTION: ABNORMAL

## 2021-07-08 PROCEDURE — 99213 OFFICE O/P EST LOW 20 MIN: CPT | Performed by: STUDENT IN AN ORGANIZED HEALTH CARE EDUCATION/TRAINING PROGRAM

## 2021-07-08 PROCEDURE — G8417 CALC BMI ABV UP PARAM F/U: HCPCS | Performed by: STUDENT IN AN ORGANIZED HEALTH CARE EDUCATION/TRAINING PROGRAM

## 2021-07-08 PROCEDURE — G8427 DOCREV CUR MEDS BY ELIG CLIN: HCPCS | Performed by: STUDENT IN AN ORGANIZED HEALTH CARE EDUCATION/TRAINING PROGRAM

## 2021-07-08 PROCEDURE — 1036F TOBACCO NON-USER: CPT | Performed by: STUDENT IN AN ORGANIZED HEALTH CARE EDUCATION/TRAINING PROGRAM

## 2021-07-08 NOTE — PROGRESS NOTES
[facility]   [facility] [address] [facility], [place] [address]    Problem Visit      Purnima Mejia  2021                       Primary Care Physician: ARIAN Howard CNP    CC:   Chief Complaint   Patient presents with    Sexually Transmitted Diseases     pelvic pain, cramping, heavy discharge          HPI: Arabella Perkins is a 26 y.o. female  No LMP recorded. Patient has had an implant. The patient was seen and examined. She is here for vaginal discharge and is complaining of pelvic cramping. Patient gets the symptoms when she has had bacterial vaginosis in the past.  We will check for vaginal cultures today.         REVIEW OF SYSTEMS:  Constitutional: negative fever, negative chills  HEENT: negative visual disturbances, negative headaches  Respiratory: negative dyspnea, negative cough  Cardiovascular: negative chest pain,  negative palpitations  Gastrointestinal: negative abdominal pain, negative RUQ pain, negative N/V, negative diarrhea, negative constipation  Genitourinary: negative dysuria, positive vaginal discharge  Dermatological: negative rash  Hematologic: negative bruising  Immunologic/Lymphatic: negative recent illness, negative recent sick contact  Musculoskeletal: negative back pain, negative myalgias, negative arthralgias  Neurological:  negative dizziness, negative weakness  Behavior/Psych: negative depression, negative anxiety    OBSTETRICAL HISTORY:  OB History    Para Term  AB Living   0 0 0 0 0 0   SAB TAB Ectopic Molar Multiple Live Births   0 0 0 0 0 0       PAST MEDICAL HISTORY:      Diagnosis Date    Allergic rhinitis     Anxiety     Asthma     Benign neoplasm of skin, site unspecified     Depression     PTSD (post-traumatic stress disorder)        PAST SURGICAL HISTORY:                                                                    Procedure Laterality Date    INTRAUTERINE DEVICE INSERTION  2020    Mirena    LAPAROSCOPY no CVA tenderness  Pelvic Exam:   External genitalia: General appearance; normal, Hair distribution; normal, Lesions absent  Urinary system: urethral meatus normal  Vaginal: normal mucosa, thin grey discharge  Cervix: normal appearing cervix without lesions  Rectal Exam: exam declined by patient  Musculoskeletal: no gross abnormalities  Extremities: non-tender BLE and non-edematous  Psych:  oriented to time, place and person       DATA:  No results found for this visit on 21. ASSESSMENT & PLAN:    Mick Brown is a 26 y.o. female  No LMP recorded. Patient has had an implant. Vaginal Discharge   - Vag probe and GC pending. Will treat based on results. Patient Active Problem List    Diagnosis Date Noted    Mirena IUD in place 20     Due out 26      PCOS (polycystic ovarian syndrome) 2020    Irregular menses 2020    Anxiety 09/15/2020    Severe depression ([facility] [address] 09/15/2020    Mild intermittent asthma without complication     Chronic post-traumatic stress disorder (PTSD) 2019    Seasonal allergies 2017    Pelvic pain in female 2014       Return if symptoms worsen or fail to improve. Counseling Completed:    Discussed need for repeat pap as per American Society for Colposcopy and Cervical Pathology guidelines. Discussed need for mammograms every 1 year, If >44 yo and last mammogram was negative. Discussed Calcium and Vitamin D dosing. Discussed need for colonoscopy screening as well as onset for bone density testing. Discussed birth control and barrier recommendations. Discussed STD counseling and prevention. Discussed Gardisil counseling for all patients 10-37 yo. Hereditary Breast, Ovarian, Colon and Uterine Cancer screening discussed. Tobacco & Secondary smoke risks discussed; with recommendation for cessation and avoidance. Routine health maintenance per patients PCP discussed.     Patient was seen with total face to face time of 15 minutes. More than 50% of this visit was on counseling and education regarding her diagnose(s) as listed below and options. She was also counseled on her preventative health maintenance recommendations and follow-up. Diagnosis Orders   1. Screening for STDs (sexually transmitted diseases)  VAGINITIS DNA PROBE    C.trachomatis N.gonorrhoeae DNA   2. Abdominal cramping  VAGINITIS DNA PROBE    C.trachomatis N.gonorrhoeae DNA   3. Vaginal discharge  VAGINITIS DNA PROBE    C.trachomatis N.gonorrhoeae DNA   4.  Pelvic pain in female  VAGINITIS DNA PROBE    C.trachomatis N.gonorrhoeae DNA         Julien Ricardo, Na [facility], [facility]  7/8/2021, 11:02 AM

## 2021-07-09 DIAGNOSIS — N76.0 BACTERIAL VAGINITIS: Primary | ICD-10-CM

## 2021-07-09 DIAGNOSIS — B96.89 BACTERIAL VAGINITIS: Primary | ICD-10-CM

## 2021-07-09 RX ORDER — METRONIDAZOLE 500 MG/1
500 TABLET ORAL 2 TIMES DAILY
Qty: 14 TABLET | Refills: 0 | Status: SHIPPED | OUTPATIENT
Start: 2021-07-09 | End: 2021-07-16

## 2021-09-08 ENCOUNTER — HOSPITAL ENCOUNTER (OUTPATIENT)
Age: 26
Setting detail: SPECIMEN
Discharge: HOME | End: 2021-09-08
Payer: COMMERCIAL

## 2021-09-08 DIAGNOSIS — N39.0 URINARY TRACT INFECTION WITHOUT HEMATURIA, SITE UNSPECIFIED: ICD-10-CM

## 2021-09-08 DIAGNOSIS — N39.0 URINARY TRACT INFECTION WITHOUT HEMATURIA, SITE UNSPECIFIED: Primary | ICD-10-CM

## 2021-09-09 ENCOUNTER — HOSPITAL ENCOUNTER (OUTPATIENT)
Age: 26
Setting detail: SPECIMEN
Discharge: HOME | End: 2021-09-09
Payer: COMMERCIAL

## 2021-09-09 ENCOUNTER — OFFICE VISIT (OUTPATIENT)
Dept: OBGYN CLINIC | Age: 26
End: 2021-09-09
Payer: COMMERCIAL

## 2021-09-09 VITALS
HEIGHT: 62 IN | WEIGHT: 244 LBS | DIASTOLIC BLOOD PRESSURE: 64 MMHG | BODY MASS INDEX: 44.9 KG/M2 | SYSTOLIC BLOOD PRESSURE: 100 MMHG

## 2021-09-09 DIAGNOSIS — R10.2 PELVIC PAIN: Primary | ICD-10-CM

## 2021-09-09 DIAGNOSIS — N89.8 VAGINAL DISCHARGE: ICD-10-CM

## 2021-09-09 DIAGNOSIS — N94.9 GENITAL LESION, FEMALE: ICD-10-CM

## 2021-09-09 LAB
CULTURE: NO GROWTH
Lab: NORMAL
SPECIMEN DESCRIPTION: NORMAL

## 2021-09-09 PROCEDURE — G8417 CALC BMI ABV UP PARAM F/U: HCPCS | Performed by: NURSE PRACTITIONER

## 2021-09-09 PROCEDURE — G8427 DOCREV CUR MEDS BY ELIG CLIN: HCPCS | Performed by: NURSE PRACTITIONER

## 2021-09-09 PROCEDURE — 1036F TOBACCO NON-USER: CPT | Performed by: NURSE PRACTITIONER

## 2021-09-09 PROCEDURE — 99213 OFFICE O/P EST LOW 20 MIN: CPT | Performed by: NURSE PRACTITIONER

## 2021-09-09 RX ORDER — VALACYCLOVIR HYDROCHLORIDE 500 MG/1
500 TABLET, FILM COATED ORAL 2 TIMES DAILY
Qty: 14 TABLET | Refills: 5 | Status: SHIPPED | OUTPATIENT
Start: 2021-09-09 | End: 2022-02-09

## 2021-09-09 RX ORDER — BUSPIRONE HYDROCHLORIDE 15 MG/1
15 TABLET ORAL 2 TIMES DAILY
COMMUNITY

## 2021-09-09 NOTE — PROGRESS NOTES
Vaginitis: Patient complains of an abnormal vaginal discharge for 1 week. Vaginal symptoms include discharge described as white, local irritation, vulvar itching, odor and burning. Vulvar symptoms include discharge described as white, local irritation, vulvar itching, odor and burning. STI Risk: Possible STD exposure   Discharge described as: normal and physiologic . Menstrual pattern: She had been bleeding irregularly. Contraception: IUD   Has been having some back pain. Admits to dysuria and urine has odor. /64 (Site: Right Upper Arm, Position: Sitting, Cuff Size: Large Adult)   Ht 5' 2\" (1.575 m)   Wt 244 lb (110.7 kg)   Breastfeeding No   BMI 44.63 kg/m²     ALLERGIES:  Flonase  O-  Physical Exam  Genitourinary:     General: Normal vulva. Labia:         Right: No rash, tenderness, lesion or injury. Left: No rash, tenderness, lesion or injury. Vagina: Normal. No foreign body. No vaginal discharge, erythema, tenderness, bleeding or lesions. 2 lesions noted on right mid-labia. Ulcerated, suspicious of HSVVery tender to palpation. HS     A. Vaginitis  options. Routine STI testing  Dysuria  Urine odor  Genital lesion  P. Affirm collected and sent to lab  Will treat w/Flagyl for BV or Diflucan if yeast pending results  Valtrex sent to pharmacy  Discussed possible dx of HSV  She was also counseled on her preventative health maintenance recommendations and follow-up.   RTO annual exam and PRN

## 2021-09-10 DIAGNOSIS — R10.2 PELVIC PAIN: ICD-10-CM

## 2021-09-10 DIAGNOSIS — N94.9 GENITAL LESION, FEMALE: ICD-10-CM

## 2021-09-10 LAB
C TRACH DNA GENITAL QL NAA+PROBE: NEGATIVE
DIRECT EXAM: ABNORMAL
Lab: ABNORMAL
N. GONORRHOEAE DNA: NEGATIVE
SPECIMEN DESCRIPTION: ABNORMAL
SPECIMEN DESCRIPTION: NORMAL

## 2021-09-10 RX ORDER — METRONIDAZOLE 500 MG/1
500 TABLET ORAL 2 TIMES DAILY
Qty: 14 TABLET | Refills: 0 | Status: SHIPPED | OUTPATIENT
Start: 2021-09-10 | End: 2021-09-17

## 2021-09-11 LAB
HSV 1, NAAT: POSITIVE
HSV 2, NAAT: POSITIVE
SPECIMEN DESCRIPTION: ABNORMAL

## 2021-09-14 LAB
HERPES SIMPLEX VIRUS 1 IGG: 0.49
HERPES SIMPLEX VIRUS 2 IGG: 0.18
HERPES TYPE 1/2 IGM COMBINED: 0.94

## 2021-11-04 ASSESSMENT — ENCOUNTER SYMPTOMS
ABDOMINAL PAIN: 0
SHORTNESS OF BREATH: 0
ABDOMINAL DISTENTION: 0
COUGH: 0
DIARRHEA: 0
BACK PAIN: 0
CONSTIPATION: 0

## 2021-11-05 ENCOUNTER — OFFICE VISIT (OUTPATIENT)
Dept: OBGYN CLINIC | Age: 26
End: 2021-11-05
Payer: COMMERCIAL

## 2021-11-05 ENCOUNTER — HOSPITAL ENCOUNTER (OUTPATIENT)
Age: 26
Setting detail: SPECIMEN
Discharge: HOME | End: 2021-11-05
Payer: COMMERCIAL

## 2021-11-05 VITALS
SYSTOLIC BLOOD PRESSURE: 100 MMHG | DIASTOLIC BLOOD PRESSURE: 64 MMHG | BODY MASS INDEX: 44.83 KG/M2 | HEIGHT: 62 IN | WEIGHT: 243.6 LBS

## 2021-11-05 DIAGNOSIS — Z30.431 IUD CHECK UP: ICD-10-CM

## 2021-11-05 DIAGNOSIS — Z01.419 ENCOUNTER FOR GYNECOLOGICAL EXAMINATION: Primary | ICD-10-CM

## 2021-11-05 PROCEDURE — 1036F TOBACCO NON-USER: CPT | Performed by: NURSE PRACTITIONER

## 2021-11-05 PROCEDURE — G8417 CALC BMI ABV UP PARAM F/U: HCPCS | Performed by: NURSE PRACTITIONER

## 2021-11-05 PROCEDURE — G8484 FLU IMMUNIZE NO ADMIN: HCPCS | Performed by: NURSE PRACTITIONER

## 2021-11-05 PROCEDURE — G8427 DOCREV CUR MEDS BY ELIG CLIN: HCPCS | Performed by: NURSE PRACTITIONER

## 2021-11-05 PROCEDURE — 99395 PREV VISIT EST AGE 18-39: CPT | Performed by: NURSE PRACTITIONER

## 2021-11-05 NOTE — PROGRESS NOTES
[facility]  [facility] [address]  [address] [place] [id number]  Dept: [phone]    DATE OF VISIT:  21        History and Physical    Ki Elver Ta    :  1995  CHIEF COMPLAINT:  No chief complaint on file. HPI :   Dennison Aschoff is a 23 y.o. female here for her annual exam. Her problem list includes irregular menses, pcos, HSV1 & 2. No recent herpes outbreaks, has plenty of valtrex in the event of an outbreak. She is a  at [employer]. Currently sees psychiatrist and therapist regularly. Avoids \"junk food,\" stays hydrated. Active at work and aware of familial diabetes risk so tries to be conscious of health. Encouraged carb intake monitoring and seeing PCP regularly for appropriate health maintenance.  pap was negative. Diagnosed with PCOS in  based on ultrasound. In May and 2021 she was diagnosed with vaginitis. Denies vaginal symptoms at this time. Menarche at 16. She is not sexually active. She uses mirena for contraception and will plan to use condoms for STI protection. Denies dyspareunia. Periods are irregular, occurring every 30 days and lasting 7 days. She denies HMB and acknowledges some light cramping with each cycle.  Happy with mirena    _____________________________________________________________________  Past Medical History:   Diagnosis Date    Allergic rhinitis     Anxiety     Asthma     Benign neoplasm of skin, site unspecified     Depression     PTSD (post-traumatic stress disorder)                                                                    Past Surgical History:   Procedure Laterality Date    INTRAUTERINE DEVICE INSERTION  2020    Mirena    LAPAROSCOPY  14    with peritoneal washings    WISDOM TOOTH EXTRACTION       Family History   Problem Relation Age of Onset    High Blood Pressure Mother     Kidney Disease Maternal Aunt     Heart Attack Maternal Aunt     Diabetes Maternal Grandmother     Ovarian Cancer Maternal Grandmother     Diabetes Maternal Grandfather     Diabetes Paternal Grandmother     Stroke Paternal Grandmother     Heart Attack Paternal Grandmother     Diabetes Paternal Grandfather     No Known Problems Father      Social History     Tobacco Use   Smoking Status Never Smoker   Smokeless Tobacco Never Used     Social History     Substance and Sexual Activity   Alcohol Use Yes    Comment: social     Current Outpatient Medications   Medication Sig Dispense Refill    busPIRone (BUSPAR) 10 MG tablet Take 10 mg by mouth 3 times daily      traZODone (DESYREL) 50 MG tablet Take 50 mg by mouth nightly      diclofenac sodium (VOLTAREN) 1 % GEL Apply 2 g topically 2 times daily 60 g 0    escitalopram (LEXAPRO) 20 MG tablet Take 20 mg by mouth daily       prazosin (MINIPRESS) 2 MG capsule Take 2 mg by mouth nightly       albuterol sulfate  (90 Base) MCG/ACT inhaler Inhale 2 puffs into the lungs every 6 hours as needed for Wheezing or Shortness of Breath 1 Inhaler 0     Current Facility-Administered Medications   Medication Dose Route Frequency Provider Last Rate Last Admin    levonorgestrel (MIRENA) IUD 52 mg 1 each  1 each IntraUTERine Continuous Sofie Reasons, DO   1 each at 20 1558       Allergies: Allergies   Allergen Reactions    Flonase [Fluticasone] Hives       Gynecologic History:  No LMP recorded. Patient has had an implant. Sexually Active: No  STD History:Yes hsv  Birth Control: Yes mirena   Pap History: negative  Gardasil: completed   Family hx Breast, Ovarian, Colorectal Cancer: Maternal grandmother had ovarian cancer and passed away due to this diagnosis before Kimmie Macias was born. OB History    Para Term  AB Living   0 0 0 0 0 0   SAB TAB Ectopic Molar Multiple Live Births   0 0 0 0 0 0       Review of Systems   Constitutional: Negative for appetite change and fatigue.    HENT: Negative for congestion and hearing loss. Eyes: Negative for visual disturbance. Respiratory: Negative for cough and shortness of breath. Cardiovascular: Negative for chest pain and palpitations. Gastrointestinal: Negative for abdominal distention, abdominal pain, constipation and diarrhea. Genitourinary: Negative for flank pain, frequency, menstrual problem, pelvic pain and vaginal discharge. Musculoskeletal: Negative for back pain. Neurological: Negative for syncope and headaches. Psychiatric/Behavioral: Negative for behavioral problems. All other systems reviewed and are negative. There were no vitals taken for this visit. Physical Exam  Constitutional:       Appearance: She is well-developed. HENT:      Head: Normocephalic. Eyes:      Extraocular Movements: Extraocular movements intact. Conjunctiva/sclera: Conjunctivae normal.   Neck:      Thyroid: No thyromegaly. Trachea: No tracheal deviation. Pulmonary:      Effort: Pulmonary effort is normal. No respiratory distress. Chest:      Breasts: Breasts are symmetrical.         Right: No inverted nipple. Left: No inverted nipple, mass, nipple discharge, skin change or tenderness. Abdominal:      General: There is no distension. Palpations: Abdomen is soft. There is no mass. Tenderness: There is no abdominal tenderness. Genitourinary:     Labia:         Right: No rash or lesion. Left: No rash or lesion. Vagina: No vaginal discharge or tenderness. Cervix: No cervical motion tenderness, discharge or friability. Uterus: Not deviated, not enlarged and not fixed. Adnexa:         Right: No mass, tenderness or fullness. Left: No mass, tenderness or fullness. Musculoskeletal:         General: No tenderness. Normal range of motion. Cervical back: Normal range of motion. Skin:     General: Skin is warm and dry.    Neurological:      General: No focal

## 2021-11-12 LAB — CYTOLOGY REPORT: NORMAL

## 2021-11-16 RX ORDER — METRONIDAZOLE 500 MG/1
500 TABLET ORAL 2 TIMES DAILY
Qty: 14 TABLET | Refills: 0 | Status: SHIPPED | OUTPATIENT
Start: 2021-11-16 | End: 2021-11-23

## 2021-11-17 DIAGNOSIS — R87.612 LOW GRADE SQUAMOUS INTRAEPITHELIAL LESION ON CYTOLOGIC SMEAR OF CERVIX (LGSIL): Primary | ICD-10-CM

## 2021-11-17 LAB
HPV SAMPLE: ABNORMAL
HPV, GENOTYPE 16: NOT DETECTED
HPV, GENOTYPE 18: NOT DETECTED
HPV, HIGH RISK OTHER: DETECTED
HPV, INTERPRETATION: ABNORMAL
SPECIMEN DESCRIPTION: ABNORMAL

## 2021-11-23 ENCOUNTER — OFFICE VISIT (OUTPATIENT)
Dept: FAMILY MEDICINE CLINIC | Age: 26
End: 2021-11-23
Payer: COMMERCIAL

## 2021-11-23 VITALS
BODY MASS INDEX: 45.64 KG/M2 | OXYGEN SATURATION: 98 % | TEMPERATURE: 97.8 F | DIASTOLIC BLOOD PRESSURE: 70 MMHG | WEIGHT: 248 LBS | HEART RATE: 87 BPM | SYSTOLIC BLOOD PRESSURE: 120 MMHG | HEIGHT: 62 IN

## 2021-11-23 DIAGNOSIS — Z00.00 ANNUAL PHYSICAL EXAM: Primary | ICD-10-CM

## 2021-11-23 PROCEDURE — G8484 FLU IMMUNIZE NO ADMIN: HCPCS | Performed by: NURSE PRACTITIONER

## 2021-11-23 PROCEDURE — 99395 PREV VISIT EST AGE 18-39: CPT | Performed by: NURSE PRACTITIONER

## 2021-11-23 SDOH — ECONOMIC STABILITY: FOOD INSECURITY: WITHIN THE PAST 12 MONTHS, YOU WORRIED THAT YOUR FOOD WOULD RUN OUT BEFORE YOU GOT MONEY TO BUY MORE.: NEVER TRUE

## 2021-11-23 SDOH — ECONOMIC STABILITY: FOOD INSECURITY: WITHIN THE PAST 12 MONTHS, THE FOOD YOU BOUGHT JUST DIDN'T LAST AND YOU DIDN'T HAVE MONEY TO GET MORE.: NEVER TRUE

## 2021-11-23 ASSESSMENT — SOCIAL DETERMINANTS OF HEALTH (SDOH): HOW HARD IS IT FOR YOU TO PAY FOR THE VERY BASICS LIKE FOOD, HOUSING, MEDICAL CARE, AND HEATING?: NOT HARD AT ALL

## 2021-11-23 NOTE — PROGRESS NOTES
[place] [address]  [id number] [facility]. [facility], [facility]  [phone]  [phone]    Mason Brewer is a 23 y.o. female who is here with c/o of:    Chief Complaint: Annual Exam      Patient Accompanied by: n/a    HPI - Mason Brewer is here today with c/o:    Patient here for annual exam    Employed: [employer]  Exercise: No but stays active  Diet: Tries to eat a balanced diet- She has been consuming seamoss in her diet  Smoker: No  Alcohol: Socially  Sleep: Averages 8 hours    Chronic Conditions:    Anxiety  Depression  PTSD  Asthma    Specialists:    GYN  Psychiatrist    Health Concerns:    None    There are no preventive care reminders to display for this patient.      Patient Active Problem List:     Pelvic pain in female     Seasonal allergies     Chronic post-traumatic stress disorder (PTSD)     Anxiety     Severe depression ([address]     Mild intermittent asthma without complication     Irregular menses     PCOS (polycystic ovarian syndrome)     Mirena IUD in place 11/17/20     Past Medical History:   Diagnosis Date    Allergic rhinitis     Anxiety     Asthma     Benign neoplasm of skin, site unspecified     Depression     PTSD (post-traumatic stress disorder)       Past Surgical History:   Procedure Laterality Date    INTRAUTERINE DEVICE INSERTION  11/17/2020    Mirena    LAPAROSCOPY  12-31-14    with peritoneal washings    WISDOM TOOTH EXTRACTION       Family History   Problem Relation Age of Onset    High Blood Pressure Mother     Kidney Disease Maternal Aunt     Heart Attack Maternal Aunt     Diabetes Maternal Grandmother     Ovarian Cancer Maternal Grandmother     Diabetes Maternal Grandfather     Diabetes Paternal Grandmother     Stroke Paternal Grandmother     Heart Attack Paternal Grandmother     Diabetes Paternal Grandfather     High Blood Pressure Father      Social History     Tobacco Use    Smoking status: Never normal.  · Cardiovascular: Normal rate, regular rhythm, S1, S2, no murmur, no gallop, no friction rub, intact distal pulses. · Pulmonary/Chest: Breath sounds are clear throughout, No respiratory distress, No wheezing, No chest tenderness. Effort normal  · Abdominal: Soft. Normal appearance, bowel sounds are present and normoactive. There is no hepatosplenomegaly. There is no tenderness. There is no CVA tenderness. · Musculoskeletal: Extremities appear regular and symmetric. No evident masses, lesions, foreign bodies, or other abnormalities. No edema. No tenderness on palpation. Joints are stable. Full ROM, strength and tone are within normal limits. · Neurological: Alert and oriented to person, place, and time. Normal motor function, Normal sensory function, No focal deficits noted. He has normal strength. · Skin: Skin is warm, dry and intact. No obvious lesions on exposed skin  · Psychiatric: Normal mood and affect. Speech is normal and behavior is normal.     Nursing note and vitals reviewed. Blood pressure 120/70, pulse 87, temperature 97.8 °F (36.6 °C), temperature source Temporal, height 5' 2\" (1.575 m), weight 248 lb (112.5 kg), SpO2 98 %, not currently breastfeeding. Body mass index is 45.36 kg/m². Wt Readings from Last 3 Encounters:   11/23/21 248 lb (112.5 kg)   11/05/21 243 lb 9.6 oz (110.5 kg)   09/09/21 244 lb (110.7 kg)     BP Readings from Last 3 Encounters:   11/23/21 120/70   11/05/21 100/64   09/09/21 100/64       Hospital Outpatient Visit on 11/05/2021   Component Date Value Ref Range Status    Cytology Report 11/05/2021    Final-Edited                    Value:INTERPRETATION    Cervical material, (ThinPrep vial, Imaging-assisted review):  Specimen Adequacy:       Satisfactory for evaluation.       -Endocervical/transformation zone component is absent. Descriptive Diagnosis:       Low-grade squamous intraepithelial lesion (LSIL).   Shift in demetrius suggestive of bacterial vaginosis. Comments:       High Risk HPV testing was ordered. Cytotechnologist:   DEEPAK Hernandez M.D.  **Electronically Signed Out**         sls/11/12/2021          Procedure/Addendum  HPV Procedure Report     Date Ordered:     11/15/2021     Status:  Signed Out       Date Complete:     11/15/2021     By: NELLY Diaz(ASCP)       Date Reported:     11/17/2021       INTERPRETATION  Roche HPV DNA High Risk                                                        HPV Sample               Thin Prep                    (Ref Range)  HPV Type 16               Not Detected                    (Not  Detected)  HPV Type 18               Not Detected                    (Not                            Detected)  Other High Risk HPV          Detected                    (Not  Detected)       This test amplifies and detects DNA of 14 high-risk HPV types  associated with cervical cancer and its precursor lesions (HPV types  16, 18, 31, 33, 35, 39, 45, 51, 52, 56, 58, 59, 66, and 68). Sensitivity may be affected by specimen collection methods, stage of  infection, and the presence of interfering substances. Results should  be interpreted in conjunction with other available laboratory and  clinical data. A negative high-risk HPV result does not exclude the  possibility of future cytologic HSIL or underlying CIN2-3 or cancer. This test is intended for medical purposes only and is not valid for  the evaluation of suspected sexual abuse or for other forensic  purposes. Source:  1: Cervical material, (ThinPrep vial, Imaging-assisted review)    Clinical History  Z01.419 Routine gyn exam without abnormal findings  High risk HPV DNA testing is requested if the diagnosis is                           abnormal    GYNECOLOGIC CYTOLOGY REPORT    Patient Name: Jonel [facility] Rec: [id number]  Path Number: [id number]  [address] [place], [address]  [phone]  Fax: [phone]      Specimen Description 11/05/2021 . CERVIX   Final    HPV Sample 11/05/2021 . THIN PREP   Final    HPV, Genotype 16 11/05/2021 Not Detected  Not Detected Final    HPV, Genotype 18 11/05/2021 Not Detected  Not Detected Final    HPV, High Risk Other 11/05/2021 DETECTED* Not Detected Final    HPV, Interpretation 11/05/2021        Final    Comment: This test amplifies and detects DNA of 14 high-risk HPV types associated with cervical   cancer and its precursor lesions (HPV types 16,18, 31, 33, 35, 39, 45, 51, 52, 56, 58, 59,   66, and 68). Sensitivity may be affected by specimen collection methods, stage of infection, and the   presence of interfering substances. Results should be interpreted in conjunction with other available laboratory and clinical   data. A negative high-risk HPV result does not exclude the possibility of future cytologic HSIL or   underlying CIN2-3 or cancer. This test is intended for medical purposes only and is not valid for the evaluation of   suspected sexual abuse or for other forensic purposes. No results found for this visit on 11/23/21. Completed Orders/Prescriptions   No orders of the defined types were placed in this encounter. AssessmentPlan/Medical Decision Making     1. Annual physical exam    -  utd  - CBC Auto Differential; Future  - Comprehensive Metabolic Panel; Future  - TSH with Reflex; Future  - Lipid, Fasting; Future      Return in about 1 year (around 11/23/2022) for annual physical.    1.  Ki received counseling on the following healthy behaviors: nutrition, exercise and medication adherence  2. Patient given educational materials - see patient instructions  3. Was a self-tracking handout given in paper form or via [employer]? No  If yes, see orders or list here. 4.  Discussed use, benefit, and side effects of prescribed medications.   Barriers to medication compliance addressed. All patient questions answered. Pt voiced understanding. 5.  Reviewed prior labs, imaging, consultation, follow up, and health maintenance  6. Continue current medications, diet and exercise. 7. Discussed use, benefit, and side effects of prescribed medications. Barriers to medication compliance addressed. All her questions were answered. Pt voiced understanding. Ki will continue current medications, diet and exercise. Patient given educational materials on well visit    Of the 30 minute duration appointment visit, Noemy Jeffries CNP spent at least 50% of the face-to-face time in counseling, explanation of diagnosis, planning of further management, and answering all questions.           Signed:  Noemy Jeffries CNP

## 2021-11-23 NOTE — PATIENT INSTRUCTIONS
Patient Education        Well Visit, Ages 25 to 48: Care Instructions  Overview     Well visits can help you stay healthy. Your doctor has checked your overall health and may have suggested ways to take good care of yourself. Your doctor also may have recommended tests. At home, you can help prevent illness with healthy eating, regular exercise, and other steps. Follow-up care is a key part of your treatment and safety. Be sure to make and go to all appointments, and call your doctor if you are having problems. It's also a good idea to know your test results and keep a list of the medicines you take. How can you care for yourself at home? · Get screening tests that you and your doctor decide on. Screening helps find diseases before any symptoms appear. · Eat healthy foods. Choose fruits, vegetables, whole grains, protein, and low-fat dairy foods. Limit fat, especially saturated fat. Reduce salt in your diet. · Limit alcohol. If you are a man, have no more than 2 drinks a day or 14 drinks a week. If you are a woman, have no more than 1 drink a day or 7 drinks a week. · Get at least 30 minutes of physical activity on most days of the week. Walking is a good choice. You also may want to do other activities, such as running, swimming, cycling, or playing tennis or team sports. Discuss any changes in your exercise program with your doctor. · Reach and stay at a healthy weight. This will lower your risk for many problems, such as obesity, diabetes, heart disease, and high blood pressure. · Do not smoke or allow others to smoke around you. If you need help quitting, talk to your doctor about stop-smoking programs and medicines. These can increase your chances of quitting for good. · Care for your mental health. It is easy to get weighed down by worry and stress. Learn strategies to manage stress, like deep breathing and mindfulness, and stay connected with your family and community.  If you find you often feel sad or hopeless, talk with your doctor. Treatment can help. · Talk to your doctor about whether you have any risk factors for sexually transmitted infections (STIs). You can help prevent STIs if you wait to have sex with a new partner (or partners) until you've each been tested for STIs. It also helps if you use condoms (male or female condoms) and if you limit your sex partners to one person who only has sex with you. Vaccines are available for some STIs, such as HPV. · Use birth control if it's important to you to prevent pregnancy. Talk with your doctor about the choices available and what might be best for you. · If you think you may have a problem with alcohol or drug use, talk to your doctor. This includes prescription medicines (such as amphetamines and opioids) and illegal drugs (such as cocaine and methamphetamine). Your doctor can help you figure out what type of treatment is best for you. · Protect your skin from too much sun. When you're outdoors from 10 a.m. to 4 p.m., stay in the shade or cover up with clothing and a hat with a wide brim. Wear sunglasses that block UV rays. Even when it's cloudy, put broad-spectrum sunscreen (SPF 30 or higher) on any exposed skin. · See a dentist one or two times a year for checkups and to have your teeth cleaned. · Wear a seat belt in the car. When should you call for help? Watch closely for changes in your health, and be sure to contact your doctor if you have any problems or symptoms that concern you. Where can you learn more? Go to [email or web address] Driveren[email or web address]. org and sign in to your [employer] account. Enter P072 in the i3 membrane box to learn more about \"Well Visit, Ages 25 to 48: Care Instructions. \"     If you do not have an account, please click on the \"Sign Up Now\" link. Current as of: February 11, 2021               Content Version: 13.0  © 6084-4412 Healthwise, Incorporated.    Care instructions adapted under license by [facility]. If you have questions about a medical condition or this instruction, always ask your healthcare professional. [place] [address] any warranty or liability for your use of this information.

## 2021-11-30 ENCOUNTER — HOSPITAL ENCOUNTER (OUTPATIENT)
Age: 26
Discharge: HOME | End: 2021-11-30
Payer: COMMERCIAL

## 2021-11-30 DIAGNOSIS — Z00.00 ANNUAL PHYSICAL EXAM: ICD-10-CM

## 2021-11-30 LAB
ABSOLUTE EOS #: 0.18 K/UL (ref 0–0.44)
ABSOLUTE IMMATURE GRANULOCYTE: <0.03 K/UL (ref 0–0.3)
ABSOLUTE LYMPH #: 3 K/UL (ref 1.1–3.7)
ABSOLUTE MONO #: 0.85 K/UL (ref 0.1–1.2)
ALBUMIN SERPL-MCNC: 3.8 G/DL (ref 3.5–5.2)
ALBUMIN/GLOBULIN RATIO: 1 (ref 1–2.5)
ALP BLD-CCNC: 119 U/L (ref 35–104)
ALT SERPL-CCNC: 22 U/L (ref 5–33)
ANION GAP SERPL CALCULATED.3IONS-SCNC: 11 MMOL/L (ref 9–17)
AST SERPL-CCNC: 18 U/L
BASOPHILS # BLD: 0 % (ref 0–2)
BASOPHILS ABSOLUTE: 0.05 K/UL (ref 0–0.2)
BILIRUB SERPL-MCNC: 0.44 MG/DL (ref 0.3–1.2)
BUN BLDV-MCNC: 8 MG/DL (ref 6–20)
BUN/CREAT BLD: ABNORMAL (ref 9–20)
CALCIUM SERPL-MCNC: 8.8 MG/DL (ref 8.6–10.4)
CHLORIDE BLD-SCNC: 103 MMOL/L (ref 98–107)
CHOLESTEROL, FASTING: 206 MG/DL
CHOLESTEROL/HDL RATIO: 6.2
CO2: 24 MMOL/L (ref 20–31)
CREAT SERPL-MCNC: 0.74 MG/DL (ref 0.5–0.9)
DIFFERENTIAL TYPE: ABNORMAL
EOSINOPHILS RELATIVE PERCENT: 2 % (ref 1–4)
GFR AFRICAN AMERICAN: >60 ML/MIN
GFR NON-AFRICAN AMERICAN: >60 ML/MIN
GFR SERPL CREATININE-BSD FRML MDRD: ABNORMAL ML/MIN/{1.73_M2}
GFR SERPL CREATININE-BSD FRML MDRD: ABNORMAL ML/MIN/{1.73_M2}
GLUCOSE BLD-MCNC: 82 MG/DL (ref 70–99)
HCT VFR BLD CALC: 43.8 % (ref 36.3–47.1)
HDLC SERPL-MCNC: 33 MG/DL
HEMOGLOBIN: 13.7 G/DL (ref 11.9–15.1)
IMMATURE GRANULOCYTES: 0 %
LDL CHOLESTEROL: 149 MG/DL (ref 0–130)
LYMPHOCYTES # BLD: 26 % (ref 24–43)
MCH RBC QN AUTO: 27.5 PG (ref 25.2–33.5)
MCHC RBC AUTO-ENTMCNC: 31.3 G/DL (ref 28.4–34.8)
MCV RBC AUTO: 87.8 FL (ref 82.6–102.9)
MONOCYTES # BLD: 8 % (ref 3–12)
NRBC AUTOMATED: 0 PER 100 WBC
PDW BLD-RTO: 14.1 % (ref 11.8–14.4)
PLATELET # BLD: 261 K/UL (ref 138–453)
PLATELET ESTIMATE: ABNORMAL
PMV BLD AUTO: 11.4 FL (ref 8.1–13.5)
POTASSIUM SERPL-SCNC: 4.1 MMOL/L (ref 3.7–5.3)
RBC # BLD: 4.99 M/UL (ref 3.95–5.11)
RBC # BLD: ABNORMAL 10*6/UL
SEG NEUTROPHILS: 64 % (ref 36–65)
SEGMENTED NEUTROPHILS ABSOLUTE COUNT: 7.26 K/UL (ref 1.5–8.1)
SODIUM BLD-SCNC: 138 MMOL/L (ref 135–144)
TOTAL PROTEIN: 7.7 G/DL (ref 6.4–8.3)
TRIGLYCERIDE, FASTING: 122 MG/DL
TSH SERPL DL<=0.05 MIU/L-ACNC: 1.95 MIU/L (ref 0.3–5)
VLDLC SERPL CALC-MCNC: ABNORMAL MG/DL (ref 1–30)
WBC # BLD: 11.4 K/UL (ref 3.5–11.3)
WBC # BLD: ABNORMAL 10*3/UL

## 2021-11-30 PROCEDURE — 36415 COLL VENOUS BLD VENIPUNCTURE: CPT

## 2021-11-30 PROCEDURE — 80053 COMPREHEN METABOLIC PANEL: CPT

## 2021-11-30 PROCEDURE — 84443 ASSAY THYROID STIM HORMONE: CPT

## 2021-11-30 PROCEDURE — 85025 COMPLETE CBC W/AUTO DIFF WBC: CPT

## 2021-11-30 PROCEDURE — 80061 LIPID PANEL: CPT

## 2021-12-14 ENCOUNTER — PROCEDURE VISIT (OUTPATIENT)
Dept: OBGYN CLINIC | Age: 26
End: 2021-12-14
Payer: COMMERCIAL

## 2021-12-14 ENCOUNTER — HOSPITAL ENCOUNTER (OUTPATIENT)
Age: 26
Setting detail: SPECIMEN
Discharge: HOME | End: 2021-12-14

## 2021-12-14 VITALS
HEIGHT: 62 IN | BODY MASS INDEX: 45.64 KG/M2 | DIASTOLIC BLOOD PRESSURE: 70 MMHG | WEIGHT: 248 LBS | SYSTOLIC BLOOD PRESSURE: 108 MMHG

## 2021-12-14 DIAGNOSIS — B97.7 HPV IN FEMALE: ICD-10-CM

## 2021-12-14 DIAGNOSIS — R87.612 LOW GRADE SQUAMOUS INTRAEPITHELIAL LESION ON CYTOLOGIC SMEAR OF CERVIX (LGSIL): Primary | ICD-10-CM

## 2021-12-14 PROCEDURE — 57454 BX/CURETT OF CERVIX W/SCOPE: CPT | Performed by: OBSTETRICS & GYNECOLOGY

## 2021-12-14 NOTE — PROGRESS NOTES
[facility]   Procedure Note    Oda Fothergill Davied Radha  2021                       Primary Care Physician: Ulices Chang, ARIAN - CNP      Subjective:   Lali Navarro 23 y.o. female Cinda Riedel is here for previously scheduled colposcopy. No LMP recorded. Patient has had an implant. . She has no complaints today. Vitals:   Blood pressure 108/70, height 5' 2\" (1.575 m), weight 248 lb (112.5 kg), not currently breastfeeding. Procedure: Colposcopy with ECC    Indications: LSIL, HPV other +     Procedure Details: The patient was counseled on the procedure. Risks, benefits and alternatives were reviewed. The patient is aware that this is diagnostic and not curative and a second procedure may be needed. A consent was reviewed and obtained. The patient was positioned comfortably on the exam table    Colposcopy, Endocervical Curettage  A sterile speculum was placed into the vagina and the cervix was identified. IUD strings ntoed The colposcope was positioned and the cervix was examined revealing no visible lesions. Green light was used to evaluate the vessels, revealing  no visible lesions. Acetoacetic acid was applied to the cervix, revealing no visible lesions. Lugol's Iodine was applied to the cervix revealing  no visible lesions. The exam was considered to be unsatisfactory with the transformation zone not visualized. Endocervical curettage was then performed and tissue collected was sent to pathology. Impression: Normal colposcopy, unsatisfactory    The patient tolerated the procedure well. Post procedure restrictions were reviewed and given to the patient. All counts and instruments were correct at the end of the procedure.        Lab:  Pregnancy Test:  Lab Results   Component Value Date    HCGQUANT <1 2020       Assessment & Plan:  Lali Navarro 23 y.o. female  with LSIL pap smear, HPV other+   - ECC collected  Patient Active Problem List    Diagnosis Date Noted    Mirena IUD in place 11/17/20 11/17/2020     Due out 11/17/25      PCOS (polycystic ovarian syndrome) 11/05/2020    Irregular menses 09/21/2020    Anxiety 09/15/2020    Severe depression ([facility] [address] 09/15/2020    Mild intermittent asthma without complication 47/80/4803    Chronic post-traumatic stress disorder (PTSD) 01/25/2019    Seasonal allergies 05/23/2017    Pelvic pain in female 44/29/5862         [address],   Ob/Gyn  12/14/2021, 3:26 PM

## 2021-12-16 LAB — SURGICAL PATHOLOGY REPORT: NORMAL

## 2022-01-12 ENCOUNTER — HOSPITAL ENCOUNTER (EMERGENCY)
Facility: CLINIC | Age: 27
Discharge: HOME | End: 2022-01-12
Attending: EMERGENCY MEDICINE
Payer: COMMERCIAL

## 2022-01-12 VITALS
BODY MASS INDEX: 44.16 KG/M2 | SYSTOLIC BLOOD PRESSURE: 132 MMHG | OXYGEN SATURATION: 97 % | TEMPERATURE: 98 F | WEIGHT: 240 LBS | HEIGHT: 62 IN | HEART RATE: 88 BPM | DIASTOLIC BLOOD PRESSURE: 71 MMHG | RESPIRATION RATE: 15 BRPM

## 2022-01-12 DIAGNOSIS — H61.23 BILATERAL IMPACTED CERUMEN: Primary | ICD-10-CM

## 2022-01-12 PROCEDURE — 99284 EMERGENCY DEPT VISIT MOD MDM: CPT

## 2022-01-12 PROCEDURE — 6370000000 HC RX 637 (ALT 250 FOR IP): Performed by: EMERGENCY MEDICINE

## 2022-01-12 PROCEDURE — 69209 REMOVE IMPACTED EAR WAX UNI: CPT

## 2022-01-12 RX ORDER — TETRACAINE HYDROCHLORIDE 5 MG/ML
1 SOLUTION OPHTHALMIC ONCE
Status: COMPLETED | OUTPATIENT
Start: 2022-01-12 | End: 2022-01-12

## 2022-01-12 RX ORDER — AMOXICILLIN 500 MG/1
500 CAPSULE ORAL 3 TIMES DAILY
Qty: 21 CAPSULE | Refills: 0 | Status: SHIPPED | OUTPATIENT
Start: 2022-01-12 | End: 2022-01-19

## 2022-01-12 RX ADMIN — TETRACAINE HYDROCHLORIDE 1 DROP: 5 SOLUTION OPHTHALMIC at 01:19

## 2022-01-12 ASSESSMENT — PAIN DESCRIPTION - ORIENTATION: ORIENTATION: LEFT;RIGHT

## 2022-01-12 ASSESSMENT — PAIN DESCRIPTION - PAIN TYPE: TYPE: ACUTE PAIN

## 2022-01-12 ASSESSMENT — PAIN SCALES - GENERAL: PAINLEVEL_OUTOF10: 10

## 2022-01-12 ASSESSMENT — PAIN DESCRIPTION - DESCRIPTORS: DESCRIPTORS: SHARP;ACHING

## 2022-01-12 ASSESSMENT — PAIN DESCRIPTION - FREQUENCY: FREQUENCY: CONTINUOUS

## 2022-01-12 NOTE — ED NOTES
Dr. Brantley Scales in to examine and irrigate both ears.  Pt tolerated well, very little cerumen returned      Darylene Luis, RN  01/12/22 [id number]

## 2022-01-12 NOTE — ED PROVIDER NOTES
[facility]  [facility]  Phone: [phone]      Pt Name: Cyndee Cedillo  ABS:[id number]  [place] 1995  Date of evaluation: 1/12/2022      CHIEF COMPLAINT       Chief Complaint   Patient presents with    Otalgia     x3 days       HISTORY OF PRESENT ILLNESS   Cyndee Cedillo is a 23 y.o. female who presents for evaluation of bilateral ear pain. The patient reports that starting 2 days ago she developed gradual onset, constant, progressive, sharp, stabbing, nonradiating pain to her left ear. She states that she was seen in urgent care earlier today and told that she had a ear infection and that they would send a prescription for an antibiotic to her pharmacy but it was never sent. She was subsequently discharged home and has been taking ibuprofen and trying to apply heat packs to her ear without improvement. She states that tonight around 11 PM she developed gradual onset, constant, progressive, right ear pain in addition to her left ear pain. She tried taking Tylenol without relief. The patient has not followed up with her PCP. She states that she does have history of cerumen impaction but stopped taking Debrox a while ago. She denies any history of ear surgeries or ear infections. She had a cough and cold approximately 1 week ago but this is resolving. She tested negative for COVID a few days ago. The patient denies fever, chills, headache, vision changes, neck pain, chest pain, shortness of breath, ear drainage, abdominal pain, sore throat, drooling, nausea, vomiting, dizziness, lightheadedness, recent injury or illness. REVIEW OF SYSTEMS     Ten point review of systems was reviewed and is negative unless otherwise noted in the HPI    [address]    has a past medical history of Allergic rhinitis, Anxiety, Asthma, Benign neoplasm of skin, site unspecified, Depression, and PTSD (post-traumatic stress disorder).     SURGICAL HISTORY      has a past surgical history that includes laparoscopy (12-31-14); [place] tooth extraction; and intrauterine device insertion (11/17/2020). CURRENT MEDICATIONS       Discharge Medication List as of 1/12/2022  1:14 AM      CONTINUE these medications which have NOT CHANGED    Details   busPIRone (BUSPAR) 10 MG tablet Take 10 mg by mouth 3 times dailyHistorical Med      traZODone (DESYREL) 50 MG tablet Take 50 mg by mouth nightlyHistorical Med      escitalopram (LEXAPRO) 20 MG tablet Take 20 mg by mouth daily Historical Med      prazosin (MINIPRESS) 2 MG capsule Take 2 mg by mouth nightly Historical Med      albuterol sulfate  (90 Base) MCG/ACT inhaler Inhale 2 puffs into the lungs every 6 hours as needed for Wheezing or Shortness of Breath, Disp-1 Inhaler,R-0Normal      valACYclovir HCl (VALTREX PO) Take by mouthHistorical Med      diclofenac sodium (VOLTAREN) 1 % GEL Apply 2 g topically 2 times daily, Topical, 2 TIMES DAILY Starting Mon 12/14/2020, Disp-60 g, R-0, Normal             ALLERGIES     is allergic to flonase [fluticasone]. FAMILY HISTORY     She indicated that her mother is alive. She indicated that her father is alive. She indicated that the status of her maternal grandmother is unknown. She indicated that the status of her maternal grandfather is unknown. She indicated that the status of her paternal grandmother is unknown. She indicated that the status of her paternal grandfather is unknown. She indicated that the status of her maternal aunt is unknown.     family history includes Diabetes in her maternal grandfather, maternal grandmother, paternal grandfather, and paternal grandmother; Heart Attack in her maternal aunt and paternal grandmother; High Blood Pressure in her father and mother; Kidney Disease in her maternal aunt; Ovarian Cancer in her maternal grandmother; Stroke in her paternal grandmother. SOCIAL HISTORY      reports that she has never smoked.  She has never used smokeless tobacco. She reports current drug use. Drug: Marijuana Eudelia Armin). She reports that she does not drink alcohol. PHYSICAL EXAM     INITIAL VITALS:  height is 5' 2\" (1.575 m) and weight is 108.9 kg (240 lb). Her oral temperature is 98 °F (36.7 °C). Her blood pressure is 132/71 and her pulse is 88. Her respiration is 15 and oxygen saturation is 97%. CONSTITUTIONAL: no apparent distress, well appearing  SKIN: warm, dry, no jaundice, hives or petechiae  EYES: clear conjunctiva, non-icteric sclera  HENT: There is cerumen impaction of the bilateral ears. After removal the left TM did look erythematous but no signs of perforation. The right TM appeared normal.  Normocephalic, atraumatic, moist mucus membranes, posterior oropharynx is clear without any erythema, edema, exudate or asymmetry. Uvula midline. No trismus or malocclusion. No pain to palpation over the frontal or maxillary sinuses. No mastoid tenderness. Able to handle secretions. Normal speech. Patent airway. No submandibular swelling or cellulitis. NECK: Nontender and supple with no nuchal rigidity, full range of motion  PULMONARY: clear to auscultation without wheezes, rhonchi, or rales, normal excursion, no accessory muscle use and no stridor  CARDIOVASCULAR: regular rate, rhythm. Strong radial pulses with intact distal perfusion. Capillary refill <2 seconds. GASTROINTESTINAL: soft, non-tender, non-distended, no palpable masses, no rebound or guarding   GENITOURINARY: No costovertebral angle tenderness to palpation  MUSCULOSKELETAL: No midline spinal tenderness, step off or deformity. Extremities are otherwise nontender to palpation and nonerythematous. Compartments soft. No peripheral edema. NEUROLOGIC: alert and oriented x 3, GCS 15, normal mentation and speech.  Moves all extremities x 4 without motor or sensory deficit, gait is stable without ataxia  PSYCHIATRIC: normal mood and affect, thought process is clear and linear    DIAGNOSTIC RESULTS EKG:  None    RADIOLOGY:   No results found. LABS:  No results found for this visit on 01/12/22. EMERGENCY DEPARTMENT COURSE:        The patient was given the following medications:  Orders Placed This Encounter   Medications    amoxicillin (AMOXIL) 500 MG capsule     Sig: Take 1 capsule by mouth 3 times daily for 7 days     Dispense:  21 capsule     Refill:  0    carbamide peroxide (DEBROX) 6.5 % otic solution     Sig: Place 5 drops into both ears 2 times daily     Dispense:  1 each     Refill:  0    tetracaine (TETRAVISC) 0.5 % ophthalmic solution 1 drop        Vitals:    Vitals:    01/12/22 0009   BP: 132/71   Pulse: 88   Resp: 15   Temp: 98 °F (36.7 °C)   TempSrc: Oral   SpO2: 97%   Weight: 108.9 kg (240 lb)   Height: 5' 2\" (1.575 m)     -------------------------  BP: 132/71, Temp: 98 °F (36.7 °C), Pulse: 88, Resp: 15    CONSULTS:  None    CRITICAL CARE:   None    PROCEDURES:  Ear Wax Removal    Date/Time: 1/12/2022 3:23 AM  Performed by: Jm López DO  Authorized by: Jm López DO     Consent:     Consent obtained:  Verbal    Consent given by:  Patient    Risks discussed:  Bleeding, infection, pain, TM perforation, incomplete removal and dizziness    Alternatives discussed:  No treatment, alternative treatment, observation, referral and delayed treatment  Procedure details:     Location:  L ear and R ear    Procedure type: irrigation      Procedure type comment:  Annita  Post-procedure details: Inspection:  Bleeding, macerated skin and TM intact    Hearing quality:  Improved    Patient tolerance of procedure: Tolerated well, no immediate complications          DIAGNOSIS/ MDM:   Da Erickson is a 23 y.o. female who presents with bilateral ear pain. She was found to have a cerumen impaction. Vital signs are stable. Exam is otherwise unremarkable.   I spent a significant time irrigating her ears until I was able to remove a large segment of wax and her pain and hearing improved. We discussed a wait-and-see antibiotic prescription. I also instructed her to use Debrox. I have low suspicion for TM perforation or mastoiditis. I instructed the patient to follow-up with her PCP in 1 to 2 days and to return to the ER for worsening symptoms or any other concern. She was told to take ibuprofen or Tylenol as needed for pain. The patient understands that at this time there is no evidence for a more malignant underlying process, but also understands that early in the process of an illness or injury, an emergency department work-up can be falsely reassuring. Routine discharge counseling was given, and the patient understands that worsening, changing or persistent symptoms should prompt a immediate call or follow-up with their primary care physician or return to the emergency department. The importance of appropriate follow-up was also discussed. I have reviewed the disposition diagnosis with the patient. I have answered their questions and given discharge instructions. They voiced understanding of these instructions and did not have any further questions or complaints. FINAL IMPRESSION      1.  Bilateral impacted cerumen          DISPOSITION/PLAN   DISPOSITION Decision To Discharge 01/12/2022 01:12:35 AM        PATIENT REFERRED TO:  ARIAN Lucas - ELIZABETH  [address] [address]  [phone]    Schedule an appointment as soon as possible for a visit in 2 days      [facility] ED  [address] [place] [address],[facility] [address] [phone]  Go to   If symptoms worsen      DISCHARGE MEDICATIONS:  Discharge Medication List as of 1/12/2022  1:14 AM      START taking these medications    Details   amoxicillin (AMOXIL) 500 MG capsule Take 1 capsule by mouth 3 times daily for 7 days, Disp-21 capsule, R-0Print      carbamide peroxide (DEBROX) 6.5 % otic solution Place 5 drops into both ears 2 times daily, Disp-1 each, R-0Normal             (Please note that portions of this note were completed with a voice recognitionprogram.  Efforts were made to edit the dictations but occasionally words are mis-transcribed.)    Romain Montgomery DO, [address] [facility]  Emergency Physician Attending         Romain Montgomery DO  01/12/22 8324

## 2022-01-12 NOTE — ED NOTES
Pt presents to the ED via private auto. Pt states she has been experiencing bilat earache x3 days. Pt was seen at an Urgent Care earlier, but treatment was unsuccessful. Pt states her left ear is more painful than the right.       Frieda Roberson RN  01/12/22 2362

## 2022-01-20 ENCOUNTER — OFFICE VISIT (OUTPATIENT)
Dept: FAMILY MEDICINE CLINIC | Age: 27
End: 2022-01-20
Payer: COMMERCIAL

## 2022-01-20 VITALS
BODY MASS INDEX: 45.73 KG/M2 | WEIGHT: 250 LBS | OXYGEN SATURATION: 96 % | SYSTOLIC BLOOD PRESSURE: 104 MMHG | TEMPERATURE: 97.3 F | HEART RATE: 92 BPM | DIASTOLIC BLOOD PRESSURE: 70 MMHG

## 2022-01-20 DIAGNOSIS — H92.03 OTALGIA OF BOTH EARS: ICD-10-CM

## 2022-01-20 DIAGNOSIS — H61.23 BILATERAL IMPACTED CERUMEN: ICD-10-CM

## 2022-01-20 DIAGNOSIS — Z09 HOSPITAL DISCHARGE FOLLOW-UP: Primary | ICD-10-CM

## 2022-01-20 PROCEDURE — G8427 DOCREV CUR MEDS BY ELIG CLIN: HCPCS | Performed by: NURSE PRACTITIONER

## 2022-01-20 PROCEDURE — 1111F DSCHRG MED/CURRENT MED MERGE: CPT | Performed by: NURSE PRACTITIONER

## 2022-01-20 PROCEDURE — G8484 FLU IMMUNIZE NO ADMIN: HCPCS | Performed by: NURSE PRACTITIONER

## 2022-01-20 PROCEDURE — 1036F TOBACCO NON-USER: CPT | Performed by: NURSE PRACTITIONER

## 2022-01-20 PROCEDURE — 69210 REMOVE IMPACTED EAR WAX UNI: CPT | Performed by: NURSE PRACTITIONER

## 2022-01-20 PROCEDURE — 99214 OFFICE O/P EST MOD 30 MIN: CPT | Performed by: NURSE PRACTITIONER

## 2022-01-20 PROCEDURE — G8417 CALC BMI ABV UP PARAM F/U: HCPCS | Performed by: NURSE PRACTITIONER

## 2022-01-20 NOTE — PROGRESS NOTES
Post-Discharge Transitional Care Management Services or Hospital Follow Up      [address]   YOB: 1995    Date of Office Visit:  1/20/2022  Date of Hospital Admission: 1/12/22  Date of Hospital Discharge: 1/12/22  Readmission Risk Score(high >=14%. Medium >=10%):No data recorded    Care management risk score Rising risk (score 2-5) and Complex Care (Scores >=6): 1     Non face to face  following discharge, date last encounter closed (first attempt may have been earlier): *No documented post hospital discharge outreach found in the last 14 days *No documented post hospital discharge outreach found in the last 14 days    Call initiated 2 business days of discharge: *No response recorded in the last 14 days     Patient Active Problem List   Diagnosis    Pelvic pain in female    Seasonal allergies    Chronic post-traumatic stress disorder (PTSD)    Anxiety    Severe depression ([address]    Mild intermittent asthma without complication    Irregular menses    PCOS (polycystic ovarian syndrome)    Mirena IUD in place 11/17/20       Allergies   Allergen Reactions    Flonase [Fluticasone] Hives       Medications listed as ordered at the time of discharge from hospital     Medication List          Accurate as of January 20, 2022  9:13 AM. If you have any questions, ask your nurse or doctor.             CONTINUE taking these medications    albuterol sulfate  (90 Base) MCG/ACT inhaler  Inhale 2 puffs into the lungs every 6 hours as needed for Wheezing or Shortness of Breath     AMOXICILLIN-POT CLAVULANATE PO     busPIRone 10 MG tablet  Commonly known as: BUSPAR     carbamide peroxide 6.5 % otic solution  Commonly known as: DEBROX  Place 5 drops into both ears 2 times daily     diclofenac sodium 1 % Gel  Commonly known as: VOLTAREN  Apply 2 g topically 2 times daily     Lexapro 20 MG tablet  Generic drug: escitalopram     Minipress 2 MG capsule  Generic drug: prazosin     traZODone 50 MG tablet  Commonly known as: Leannalety Connorsler PO              Medications marked \"taking\" at this time  Outpatient Medications Marked as Taking for the 1/20/22 encounter (Office Visit) with ARIAN Bob CNP   Medication Sig Dispense Refill    AMOXICILLIN-POT CLAVULANATE PO       carbamide peroxide (DEBROX) 6.5 % otic solution Place 5 drops into both ears 2 times daily 1 each 0    valACYclovir HCl (VALTREX PO) Take by mouth      busPIRone (BUSPAR) 10 MG tablet Take 10 mg by mouth 3 times daily      traZODone (DESYREL) 50 MG tablet Take 50 mg by mouth nightly      diclofenac sodium (VOLTAREN) 1 % GEL Apply 2 g topically 2 times daily 60 g 0    escitalopram (LEXAPRO) 20 MG tablet Take 20 mg by mouth daily       prazosin (MINIPRESS) 2 MG capsule Take 2 mg by mouth nightly       albuterol sulfate  (90 Base) MCG/ACT inhaler Inhale 2 puffs into the lungs every 6 hours as needed for Wheezing or Shortness of Breath 1 Inhaler 0        Medications patient taking as of now reconciled against medications ordered at time of hospital discharge: Yes    Chief Complaint   Patient presents with    Follow-Up from [address] [place] [address] 1/12/22 dx otalgia       HPI    Inpatient course: Discharge summary reviewed- see chart. Interval history/Current status:     Reports that she went to ER on 1/12 due to ear pain. She had a lot of cerumen in bilateral ears. They were cleared and she was told she had infection of left ear. Was put on Augmentin. Says she is almost done taking it. Has been using her debrox drops. Denies any pain or drainage from the ear. Review of Systems   All other systems reviewed and are negative. Vitals:    01/20/22 0900   BP: 104/70   Site: Left Upper Arm   Position: Sitting   Cuff Size: Large Adult   Pulse: 92   Temp: 97.3 °F (36.3 °C)   TempSrc: Temporal   SpO2: 96%   Weight: 250 lb (113.4 kg)     Body mass index is 45.73 kg/m².    Wt Readings from Last 3 Encounters:   01/20/22 250 lb (113.4 kg)   01/12/22 240 lb (108.9 kg)   12/14/21 248 lb (112.5 kg)     BP Readings from Last 3 Encounters:   01/20/22 104/70   01/12/22 132/71   12/14/21 108/70       Physical Exam  Constitutional:       Appearance: Normal appearance. She is obese. HENT:      Right Ear: There is impacted cerumen. Left Ear: There is impacted cerumen. Neurological:      Mental Status: She is alert and oriented to person, place, and time. Psychiatric:         Mood and Affect: Mood normal.         Behavior: Behavior normal.         Thought Content: Thought content normal.         Judgment: Judgment normal.             Assessment/Plan:  1. Hospital discharge follow-up    - CT DISCHARGE MEDS RECONCILED W/ CURRENT OUTPATIENT MED LIST    2.  Otalgia of both ears    - CT DISCHARGE MEDS RECONCILED W/ CURRENT OUTPATIENT MED LIST    3. Bilateral impacted cerumen    - Left TM visualized and erythematous continue antibiotics prescribed in ER  - Right TM unable to visualized due to cerumen- recommend continue debrox drops for 5 more days and schedule RN visit for cerumen removal.   - 57490 - CT REMOVE IMPACTED EAR WAX        Medical Decision Making: moderate complexity        Electronically Signed by: ARIAN Leonardo-CNP

## 2022-02-09 RX ORDER — VALACYCLOVIR HYDROCHLORIDE 500 MG/1
TABLET, FILM COATED ORAL
Qty: 14 TABLET | Refills: 5 | Status: SHIPPED | OUTPATIENT
Start: 2022-02-09

## 2022-04-14 DIAGNOSIS — M25.572 LEFT ANKLE PAIN, UNSPECIFIED CHRONICITY: Primary | ICD-10-CM

## 2022-04-28 ENCOUNTER — OFFICE VISIT (OUTPATIENT)
Dept: ORTHOPEDIC SURGERY | Age: 27
End: 2022-04-28
Payer: COMMERCIAL

## 2022-04-28 VITALS — HEIGHT: 62 IN | RESPIRATION RATE: 16 BRPM | WEIGHT: 250 LBS | BODY MASS INDEX: 46.01 KG/M2

## 2022-04-28 DIAGNOSIS — M76.62 ACHILLES TENDINITIS OF LEFT LOWER EXTREMITY: Primary | ICD-10-CM

## 2022-04-28 PROCEDURE — G8427 DOCREV CUR MEDS BY ELIG CLIN: HCPCS | Performed by: ORTHOPAEDIC SURGERY

## 2022-04-28 PROCEDURE — 99214 OFFICE O/P EST MOD 30 MIN: CPT | Performed by: ORTHOPAEDIC SURGERY

## 2022-04-28 PROCEDURE — G8417 CALC BMI ABV UP PARAM F/U: HCPCS | Performed by: ORTHOPAEDIC SURGERY

## 2022-04-28 PROCEDURE — 1036F TOBACCO NON-USER: CPT | Performed by: ORTHOPAEDIC SURGERY

## 2022-04-28 RX ORDER — NAPROXEN 500 MG/1
500 TABLET ORAL 2 TIMES DAILY PRN
Qty: 60 TABLET | Refills: 0 | Status: SHIPPED | OUTPATIENT
Start: 2022-04-28 | End: 2022-10-03

## 2022-04-28 RX ORDER — NAPROXEN 500 MG/1
500 TABLET ORAL 2 TIMES DAILY PRN
Qty: 60 TABLET | Refills: 0 | Status: CANCELLED | OUTPATIENT
Start: 2022-04-28

## 2022-04-28 NOTE — PROGRESS NOTES
[address] [place] [address] AND SPORTS MEDICINE  [facility]  [address] [address]  Dept: [phone]    Ambulatory Orthopedic Consult      CHIEF COMPLAINT:    Chief Complaint   Patient presents with    Ankle Pain     Left        HISTORY OF PRESENT ILLNESS:      The patient is a 23 y.o. female who is being seen for consultation and evaluation of pain at the posterior aspect of the left Achilles tendon/heel, which began atraumatically in 2018. The pain is described mainly with mechanical terms (dull/sharp/throbbing). The pain is worse with activity and better with rest. The patient reports a progressive course. The patient has tried:      [x]  rest/activity modification          [x]  NSAIDs      []  opiates      []  orthotics        []  change in shoes   []  home exercises  []  physical therapy      []  CAM boot     []  brace:    []  injection:       []  surgery:      INTERVAL HISTORY 12/11/2020:  She is seen again today in the office for follow up of a previous issue (as above). Since being seen last, the patient is doing better. She is ambulating today using a CAM boot. The location and quality of the pain have not significantly changed since the last visit. INTERVAL HISTORY 4/9/2021:  She is seen again today in the office for follow up of a previous issue (as above). Since being seen last, the patient is doing worse. She is ambulating today using a CAM boot. History is obtained through the EMR today, as well as directly from the patient. Around 2/23/2021, the patient felt extreme pain and experienced a pop upon getting up from the couch, and she was seen by our sports medicine doctor, Dr. Jamar Hidalgo, after that incidence. INTERVAL HISTORY 4/30/2021:  She is seen again today in the office for follow up of imaging as below. Since being seen last, the patient is doing about the same overall.  At today's visit, she is using a brace/boot. History is obtained today from:   [x]  the patient     [x]  EMR     []  one family member/friend    []  multiple family members/friends    []  other:      INTERVAL HISTORY 4/28/2022:  She is seen again today in the office for follow up of a previous issue (as above). Since being seen last, the patient is doing about the same overall. At today's visit, she is not using a brace or assistive device. History is obtained today from:   [x]  the patient     []  EMR     []  one family member/friend    []  multiple family members/friends    []  other:          REVIEW OF SYSTEMS:  Musculoskeletal: See HPI for pertinent positives     Past Medical History:    She  has a past medical history of Allergic rhinitis, Anxiety, Asthma, Benign neoplasm of skin, site unspecified, Depression, and PTSD (post-traumatic stress disorder). Past Surgical History:    She  has a past surgical history that includes laparoscopy (12-31-14); [place] tooth extraction; and intrauterine device insertion (11/17/2020).      Current Medications:     Current Outpatient Medications:     valACYclovir (VALTREX) 500 MG tablet, take 1 tablet by mouth twice a day for 7 days, Disp: 14 tablet, Rfl: 5    AMOXICILLIN-POT CLAVULANATE PO, , Disp: , Rfl:     valACYclovir HCl (VALTREX PO), Take by mouth, Disp: , Rfl:     busPIRone (BUSPAR) 10 MG tablet, Take 10 mg by mouth 3 times daily, Disp: , Rfl:     traZODone (DESYREL) 50 MG tablet, Take 50 mg by mouth nightly, Disp: , Rfl:     diclofenac sodium (VOLTAREN) 1 % GEL, Apply 2 g topically 2 times daily, Disp: 60 g, Rfl: 0    escitalopram (LEXAPRO) 20 MG tablet, Take 20 mg by mouth daily , Disp: , Rfl:     prazosin (MINIPRESS) 2 MG capsule, Take 2 mg by mouth nightly , Disp: , Rfl:     albuterol sulfate  (90 Base) MCG/ACT inhaler, Inhale 2 puffs into the lungs every 6 hours as needed for Wheezing or Shortness of Breath, Disp: 1 Inhaler, Rfl: 0     Allergies:    Flonase [fluticasone]    Family History:  family history includes Diabetes in her maternal grandfather, maternal grandmother, paternal grandfather, and paternal grandmother; Heart Attack in her maternal aunt and paternal grandmother; High Blood Pressure in her father and mother; Kidney Disease in her maternal aunt; Ovarian Cancer in her maternal grandmother; Stroke in her paternal grandmother. Social History:   Social History     Occupational History    Not on file   Tobacco Use    Smoking status: Never Smoker    Smokeless tobacco: Never Used   Vaping Use    Vaping Use: Never used   Substance and Sexual Activity    Alcohol use: Never     Comment: social    Drug use: Yes     Types: Marijuana [place])     Comment: hasn't used since august 2014    Sexual activity: Yes     Birth control/protection: I.U.D. 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 Skin: No erythema/ulcers. Intact. Neurovascular Status:  Grossly neurovascularly intact throughout   Tenderness to Palpation:    -Heel valgus, pes planus      LLE:  Vascular: Limb well perfused, compartments soft/compressible. Skin: No erythema/ulcers. Intact. Neurovascular Status:  Grossly neurovascularly intact throughout   Tenderness to Palpation: Distal Achilles tendon and Achilles tendon insertion  -Heel valgus, pes planus    -Pain with resisted plantarflexion  -No nodules/thickening of the tendon palpated, no palpable gap of the Achilles tendon palpated      RADIOLOGY:   4/28/2022 No new radiology images today. Prior images reviewed for reference.         MRI images and radiology report reviewed, as below:    Normal appearance of the Achilles tendon with very mild marrow work, but does report that she has been intermittently using a cam boot; she reports that she can walk around [facility] 1 time, but then has to stop due to pain. At today's visit, after a thorough discussion of surgical nonsurgical treatment options, we decided to proceed with nonsurgical treatment options for the time being    Orders/referrals were placed as below at today's visit. She was provided heel lifts for her shoes, which she will use as needed to avoid pain. Patient was again referred to physical therapy for my Achilles tendinopathy protocol. We have discussed the risk of tendon rupture. At today's visit, she was ordered oral NSAIDs as below to be used as needed, and we discussed the appropriate risks. The patient was provided teaching material on this today, and will avoid using multiple NSAIDs at the same time. All questions were answered and the patient agrees with the above plan. The patient will return to clinic in 3 months without x-rays. At her next visit, depending on how she is doing, we may consider a repeat left ankle MRI if she wishes to proceed with surgery. No follow-ups on file. No orders of the defined types were placed in this encounter. No orders of the defined types were placed in this encounter. Linda Brandt MD  Orthopedic Surgery, Foot and Ankle        Please excuse any typos/errors, as this note was created with the assistance of voice recognition software. While intending to generate a document that actually reflects the content of the visit, the document can still have some errors including those of syntax and sound-a-like substitutions which may escape proof reading. In such instances, actual meaning can be extrapolated by context.

## 2022-06-09 ENCOUNTER — HOSPITAL ENCOUNTER (OUTPATIENT)
Dept: PHYSICAL THERAPY | Facility: CLINIC | Age: 27
Setting detail: THERAPIES SERIES
Discharge: HOME | End: 2022-06-09
Payer: COMMERCIAL

## 2022-06-09 PROCEDURE — 97162 PT EVAL MOD COMPLEX 30 MIN: CPT

## 2022-06-09 PROCEDURE — 97016 VASOPNEUMATIC DEVICE THERAPY: CPT

## 2022-06-09 PROCEDURE — 97110 THERAPEUTIC EXERCISES: CPT

## 2022-06-09 PROCEDURE — 97140 MANUAL THERAPY 1/> REGIONS: CPT

## 2022-06-09 NOTE — CONSULTS
[] [facility]  Outpatient Rehabilitation &  Therapy  [facility]   [place]: [phone]  F: [phone] [x] [address]  P: [phone]  F: [phone]     Physical Therapy Lower Extremity Evaluation    Date:  2022  Patient: Darrion Ding  : 1995  MRN: [id number]  Physician: Curt Troncoso MD     Insurance: [facility] (Auth after Eval, 30 visit limit, no patient liability)  Medical Diagnosis: Achilles Tendonitis M76.62    Rehab Codes: Left Ankle pain (M25. 572)  Onset date: 2018     Next Dr's appt. : 2022    Subjective:   CC/HPI:The patient has a history of chronic Achilles pain dating back to . She has had prior PT with improvement but not full resolution. She had been doing fairly well until early April when her Achilles flared up. She returned to Dr. Shaka Middleton who recommended PT. PMHx: [] Unremarkable [] Diabetes [] HTN  [] Pacemaker   [] MI/Heart Problems [] Cancer [] Arthritis   [] Other:              [x] Refer to full medical chart  In EPIC     Tests: [] X-Ray:    [x] MRI:  2021   [] Other:   Normal appearance of the Achilles tendon with very mild marrow edema at its   calcaneal insertion that may represent stress related changes.        Comorbidities:   [] Obesity [] Dialysis  [] N/A   [x] Asthma/COPD [] Dementia [x] Other:Anxiety, Depression   [] Stroke [] Sleep apnea [] Other:   [] Vascular disease [] Rheumatic disease [] Other:       Medications: [x] Refer to full medical record [] None [] Other:  Allergies:      [x] Refer to full medical record [] None [] Other:    ADL/IADL [x] Previously independent with all [x] Currently independent with all Who currently assists the patient with task     [] Previously independent with all except: [] Currently independent with all except:     Bathing  [] Assist [] Assist     Dress/grooming [] Assist [] Assist Transfer/mobility [] Assist [] Assist     Feeding [] Assist [] Assist     Toileting [] Assist [] Assist     Driving [] Assist [] Assist     Housekeeping [] Assist [] Assist     Grocery shop/meal prep [] Assist [] Assist        Gait Prior level of function Current level of function    [x] Independent  [] Assist [x] Independent  [] Assist   Device: [] Independent [x] Independent    [] Straight Cane [] Quad cane [] Straight Cane [] Quad cane    [] Standard walker [] Rolling walker   [] 4 wheeled walker [] Standard walker [] Rolling walker   [] 4 wheeled walker    [] Wheelchair [] Wheelchair       Martial Status    Home type 2 story   Stairs from outside yes   Stairs inside 1 flight   Employement [employer]   Job status No restrictions   Work Activities/duties  [place],    Recreational Activities walking       Pain present? yes   Location Left Achilles   Pain Rating currently 5   Pain at worse 7   Pain at best 5   Description of pain Intermittent dull, burning, aching   Altered Sensation no   What makes it worse Walking, standing, stairs, running   What makes it better rest   Symptom progression worse   Sleep WNL             Objective:    ROM  ° A/P STRENGTH    Left Right Left Right   Hip Flex   5 5   Ext   5 5   ER       IR       ABD       ADD       Knee Flex   5 5   Ext   5 5   Ankle PF   3 pn 5   DF  (knee straight)       DF   (knee flexed) 30 30 5 5   lInv 25 25 5 5   lEver 20 20 5 5   1st MTP DF       1st MTP PF                                                   Fully compensated forefoot varus, Flexible pes planus      TESTS (+/-) Left Right Not Tested   Ant.  Drawer   [x]   Post. Drawer   [x]   Varus stress    [x]   Valgus Stress    [x]   Gastroc Equinus - - []   Achilles pinch + - []      []     Foot/Ankle Mobility  Left  Right    Talocrural Jt WNL WNL    Subtalar Jt WNL WNL    1st MTP Jt  WNL WNL    Forefoot WNL WNL    Midfoot  WNL WNL          OBSERVATION No Deficit Deficit Comments Posture      Forward Head [x] []    Rounded Shoulders [x] []    Kyphosis [x] []    Lordosis [x] []    Genu Valgus [] [x]    Genu Varus [x] []    Genu Recurvatum [x] []    Cavo Varus Foot [x] []    Neutral Foot [x] []    Hustler Valgus Foot [] [x]    Gastroc Equinus [] []    Hallux Valgus [x] []    Pronation [x] []    Supination [x] []    Leg Length Discrp [x] []     [] []    Palpation [] [x] TTP Left Achilles, calcaneus and medial soleus   Sensation [x] []    Edema [] [x] Mild edema to Achilles   Neurological [x] []    Patellar Mobility [x] []    Patellar Orientation [x] []    Gait [x] [] Analysis:         FUNCTION Normal Difficult Unable   Sitting [x] [] []   Standing [] [x] []   Ambulation [] [] []   Groom/Dress [x] [] []   Lift/Carry [] [x] []   Stairs [] [x] []   Bending [] [x] []   Squat [x] [] []   Kneel [x] [] []         BALANCE/PROPRIOCEPTION              [] Not tested   Single leg stance       R                     L                                PAIN   Eyes open                   >15   Sec.     >15       Sec                  . []    Eyes closed                          Sec. Sec                  . []           Flexibility Normal Left tight Right tight   Hip flexor [x] [] []   quad [x] [] []   HS [x] [] []   piriformis [x] [] []   ITB [x] [] []   gastroc [x] [] []   Soleus  [x] [] []    [] [] []    [] [] []        Somatic Dysfunctions Normal Deficit Details   Cervical   [x] []    Thoracic   [x] []    Rib   [x] []    Pelvis   [x] []    Lumbar [x] []    SI   [x] []        Functional Test: LEFS Score: 47/80, 42% functionally impaired       Comments:      Assessment:     The patient is a 23year old with a chief complaint of left Achilles pain and signs and symptoms consistent with a diagnosis of Chronic Achilles tendinitis. She presents with pain, weakness, tenderness and functional limitations. She will benefit from skilled PT to address above deficits. Problems:    [x] ?  Pain:  [] ? ROM:  [x] ? Strength:  [x] ? Function:  [] Other:            Goals  MET NOT MET ON-  GOING  Details   Date Addressed: 6/09/22       STG: To be met in 6 treatments           1. ? Pain: Decrease pain levels to 4/10 with ADL/sport  []  []  []      2. ? ROM: Increase flexibility and AROM limitations throughout to equal bilat to reduce difficulty with ADLs []  []  []      3. ? Strength: Increase MMT to 5/5 throughout to ease functional limitations and mobility  []  []  []     4. Independent with Home Exercise Programs []  []  []     5. Demonstrate knowledge of fall risk prevention  []  []  []      []  []  []     Date Addressed: 6/09/2022       LTG: To be met in 16 treatments       1. Improve score on assessment tool LEFS from 42% impairment to less than 22% impairment  []  []  []     2. Reduce pain levels to 2/10 or less with ADLs/sport []  []  []      []  []  []                Patient goals:Avoid surgery    Rehab Potential:  [x] Good  [] Fair  [] Poor   Suggested Professional Referral:  [x] No  [] Yes:  Barriers to Goal Achievement[de-identified]  [] No  [x] Yes: Chronic nature of pain  Domestic Concerns:  [x] No  [] Yes:    Pt. Education:  [x] Plans/Goals, Risks/Benefits discussed  [x] Home exercise program    Method of Education: [x] Verbal  [x] Demo  [x] Written  Comprehension of Education:  [] Verbalizes understanding. [] Demonstrates understanding. [x] Needs Review. [] Demonstrates/verbalizes understanding of HEP/Ed previously given. Treatment Plan:  [x] Therapeutic Exercise    [] Aquatic Therapy   [x] Manual Therapy     [] Electrical Stimulation  [x] Instruction in HEP      [] Lumbar/Cervical Traction  [x] Neuromuscular Re-education [x] Cold/hotpack  [] Iontophoresis: 4 mg/mL  [x] Vasocompression (GameReady)                    Dexamethasone Sodium  [] Gait Training             Phosphate 40-80 mAmin         .     Frequency:  2 x/week for 16 visits    Todays Treatment:    Exercises:  Exercise     Reps/ Time Weight/ Level Comments   Seated arch lift x15     Seated toe yoga 2x15     Seated eccentric HR 2x15     Standing weight shifts 2x10                                                                       Other:  Manual:  1. IASTM with sidekick to gastroc, soleus and Achilles  2. Cross friction massage to distal medial Achilles  3. MFR to medial soleus    Vaso-compression: Left foot and ankle 10', 34 degrees, low compression    Access Code: [email or web address]  URL: [email or web address]  Date: 06/09/2022  Prepared by: Riki Us    Exercises  Seated Arch Lifts - 1 x daily - 7 x weekly - 3 sets - 20 reps  Seated Great Toe Extension - 1 x daily - 7 x weekly - 3 sets - 20 reps  Seated Lesser Toes Extension - 1 x daily - 7 x weekly - 3 sets - 20 reps  Standing Anterior Posterior Weight Shift - 1 x daily - 7 x weekly - 2 sets - 15 reps    Specific Instructions for next treatment: MT and Modalities PRN, Review HEP, Hip stability, S/L balance training, Progressive eccentrics as tolerated. Evaluation Complexity:  History (Personal factors, comorbidities) [] 0 [x] 1-2 [] 3+   Exam (limitations, restrictions) [] 1-2 [x] 3 [] 4+   Clinical presentation (progression) [] Stable [x] Evolving  [] Unstable   Decision Making [] Low [x] Moderate [] High    [] Low Complexity [x] Moderate Complexity [] High Complexity       Treatment Charges: Mins Units   [x] Evaluation       []  Low       [x]  Moderate       []  High 20 1   []  Modalities     [x]  Ther Exercise 15 1   [x]  Manual Therapy 15 1   []  Ther Activities     []  Aquatics     [x]  Vasocompression 10 1   []  Other       TOTAL TREATMENT TIME: 60    Time in: 8:00am   Time Out: 9:00am    Electronically signed by: Riki Us PT        Physician Signature:________________________________Date:__________________  By signing above or cosigning this note, I have reviewed this plan of care and certify a need for medically necessary rehabilitation services.      *PLEASE SIGN ABOVE AND FAX BACK ALL PAGES*

## 2022-06-09 NOTE — CONSULTS
[] [address]  [address]  [phone]  F: [phone] [] [address]  [facility]   [address]  P: [phone]  F: [phone] [] [place]  [address] [facility]  P: [phone]  F: [phone] [x] [address]  P: [phone]  F: [phone] [] [address]  [facility]   [address]   [place]: [phone]  F: [phone]      Physical Therapy Lower Extremity Evaluation    Date:  2022  Patient: Brenda Barreto  : 1995  MRN: [id number]  Physician: ***   Insurance: ***  Medical Diagnosis: ***  Rehab Codes: ***  Onset date: ***  Next Dr's appt.: ***    Subjective:   CC:  HPI: (onset date)    PMHx: [] Unremarkable [] Diabetes [] HTN  [] Pacemaker   [] MI/Heart Problems [] Cancer [] Arthritis [] Other:              [] Refer to full medical chart  In EPIC       Comorbidities:   [] Obesity [] Dialysis  [] N/A   [] Asthma/COPD [] Dementia [] Other:   [] Stroke [] Sleep apnea [] Other:   [] Vascular disease [] Rheumatic disease [] Other:     Tests: [] X-Ray: [] MRI:  [] Other:    Medications: [] Refer to full medical record [] None [] Other:  Allergies:      [] Refer to full medical record [] None [] Other:    Function:  Hand Dominance  [] Right  [] Left  Patient lives with:     In what type of home []  One story   [] Two story   [] Split level   Number of stairs to enter    With handrail on the []  Right to enter   [] Left to enter   Bathroom has a []  Tub only  [] Tub/shower combo   [] Walk in shower    []  Grab bars   Washing machine is on []  Main level   [] Second level   [] Basement   Employer    Job Status []  Normal duty   [] Light duty   [] Off due to condition    []  Retired   [] Not employed   [] Disability  [] Other:  []  Return to work: Work activities/duties        ADL/IADL Previous level of function Current level of function Who currently assists the patient with task   Bathing  [] Independent  [] Assist [] Independent  [] Assist    Dress/grooming [] Independent  [] Assist [] Independent  [] Assist    Transfer/mobility [] Independent  [] Assist [] Independent  [] Assist    Feeding [] Independent  [] Assist [] Independent  [] Assist    Toileting [] Independent  [] Assist [] Independent  [] Assist    Driving [] Independent  [] Assist [] Independent  [] Assist    Housekeeping [] Independent  [] Assist [] Independent  [] Assist    Grocery shop/meal prep [] Independent  [] Assist [] Independent  [] Assist      Gait Prior level of function Current level of function    [] Independent  [] Assist [] Independent  [] Assist   Device: [] Independent [] Independent    [] Straight Cane [] Quad cane [] Straight Cane [] Quad cane    [] Standard walker [] Rolling walker   [] 4 wheeled walker [] Standard walker [] Rolling walker   [] 4 wheeled walker    [] Wheelchair [] Wheelchair     Pain:  [] Yes  [] No Location: *** Pain Rating: (0-10 scale) ***/10  Pain altered Tx:  [] Yes  [] No  Action:    Symptoms:  [] Improving [] Worsening [] Same  Better:  [] AM    [] PM    [] Sit    [] Rise/Sit    []Stand    [] Walk    [] Lying    [] Other:  Worse: [] AM    [] PM    [] Sit    [] Rise/Sit    []Stand    [] Walk    [] Lying    [] Bend                      [] Valsalva    [] Other:  Sleep: [] OK    [] Disturbed    Objective:    ROM  ° A/P STRENGTH TESTS (+/-) Left Right Not Tested    Left Right Left Right Ant.  Drawer   []   Hip Flex     Post. Drawer   []   Ext     Lachmans   []   ER     Valgus Stress   []   IR     Varus Stress   []   ABD     Niyas   []   ADD     Apleys Comp.   []   Knee Flex     Apleys Dist.   []   Ext     Hip Scouring   []   Ankle DF     KATHIEs   []   PF     Piriformis   []   INV     Gregorys []   EVER     Talor Tilt   []        Pat-Fem Romel Backers   []       OBSERVATION No Deficit Deficit Not Tested Comments   Posture       Forward Head [] [] []    Rounded Shoulders [] [] []    Kyphosis [] [] []    Lordosis [] [] []    Lateral Shift [] [] []    Scoliosis [] [] []    Iliac Crest [] [] []    PSIS [] [] []    ASIS [] [] []    Genu Valgus [] [] []    Genu Varus [] [] []    Genu Recurvatum [] [] []    Pronation [] [] []    Supination [] [] []    Leg Length Discrp [] [] []    Slumped Sitting [] [] []    Palpation [] [] []    Sensation [] [] []    Edema [] [] []    Neurological [] [] []    Patellar Mobility [] [] []    Patellar Orientation [] [] []    Gait [] [] [] Analysis:         FUNCTION Normal Difficult Unable   Sitting [] [] []   Standing [] [] []   Ambulation [] [] []   Groom/Dress [] [] []   Lift/Carry [] [] []   Stairs [] [] []   Bending [] [] []   Squat [] [] []   Kneel [] [] []     BALANCE/PROPRIOCEPTION              [] Not tested   Single leg stance       R                     L                                PAIN   Eyes open                             Sec. Sec                  . []    Eyes closed                          Sec. Sec                  . []        FUNCTIONAL TESTS PAIN NO PAIN COMMENTS   Step Test 4 [] []    6 [] []    8 [] []    Squat [] []      Functional Test: *** Score: ***% functionally impaired     Comments:    Assessment:  Patient would benefit from skilled physical therapy services in order to: ***    Problems:    [] ? Pain:  [] ? ROM:  [] ? Strength:  [] ? Function:  [] Other:       STG: (to be met in *** treatments)  1. ? Pain:  2. ? ROM:  3. ? Strength:  4. ? Function:  5. Patient to be independent with home exercise program as demonstrated by performance with correct form without cues.   6. Demonstrate Knowledge of fall prevention  LTG: (to be met in *** treatments)  1.   2.                    Patient goals:    Rehab Potential:  [] Good  [] Fair [] Poor   Suggested Professional Referral:  [] No  [] Yes:  Barriers to Goal Achievement:  [] No  [] Yes:  Domestic Concerns:  [] No  [] Yes:    Pt. Education:  [] Plans/Goals, Risks/Benefits discussed  [] Home exercise program    Method of Education: [] Verbal  [] Demo  [] Written  Comprehension of Education:  [] Verbalizes understanding. [] Demonstrates understanding. [] Needs Review. [] Demonstrates/verbalizes understanding of HEP/Ed previously given. Treatment Plan:  [] Therapeutic Exercise   29518  [] Iontophoresis: 4 mg/mL Dexamethasone Sodium Phosphate  mAmin  44732   [] Therapeutic Activity  88096 [] Vasopneumatic cold with compression  62448    [] Gait Training   67845 [] Ultrasound   35533   [] Neuromuscular Re-education  10085 [] Electrical Stimulation Unattended  00725   [] Manual Therapy  32284 [] Electrical Stimulation Attended  40088   [] Instruction in HEP  [] Lumbar/Cervical Traction  46050   [] Aquatic Therapy   29755 [] Cold/hotpack    [] Massage   86901      [] Dry Needling, 1 or 2 muscles  60713   [] Biofeedback, first 15 minutes   23681  [] Biofeedback, additional 15 minutes   36145 [] Dry Needling, 3 or more muscles  24400     []  Medication allergies reviewed for use of    Dexamethasone Sodium Phosphate 4mg/ml     with iontophoresis treatments. Pt is not allergic.     Frequency:  *** x/week for *** visits        Todays Treatment:  Modalities:   Precautions:  Exercises:  Exercise Reps/ Time Weight/ Level Comments                                 Other:    Specific Instructions for next treatment:      Evaluation Complexity:  History (Personal factors, comorbidities) [] 0 [] 1-2 [] 3+   Exam (limitations, restrictions) [] 1-2 [] 3 [] 4+   Clinical presentation (progression) [] Stable [] Evolving  [] Unstable   Decision Making [] Low [] Moderate [] High    [] Low Complexity [] Moderate Complexity [] High Complexity       Treatment Charges: Mins Units   [] Evaluation       []  Low []  Moderate       []  High  1   []  Modalities     []  Ther Exercise     []  Manual Therapy     []  Ther Activities     []  Aquatics     []  Vasocompression     []  Other       TOTAL TREATMENT TIME: ***    Time in:***   Time Out:***    Electronically signed by: NALINI Nunes        Physician Signature:________________________________Date:__________________  By signing above or cosigning this note, I have reviewed this plan of care and certify a need for medically necessary rehabilitation services.      *PLEASE SIGN ABOVE AND FAX BACK ALL PAGES*

## 2022-06-14 ENCOUNTER — TELEPHONE (OUTPATIENT)
Dept: FAMILY MEDICINE CLINIC | Age: 27
End: 2022-06-14

## 2022-06-14 ENCOUNTER — HOSPITAL ENCOUNTER (OUTPATIENT)
Dept: PHYSICAL THERAPY | Facility: CLINIC | Age: 27
Setting detail: THERAPIES SERIES
Discharge: HOME | End: 2022-06-14
Payer: COMMERCIAL

## 2022-06-14 PROCEDURE — 97110 THERAPEUTIC EXERCISES: CPT

## 2022-06-14 PROCEDURE — 97140 MANUAL THERAPY 1/> REGIONS: CPT

## 2022-06-14 PROCEDURE — 97016 VASOPNEUMATIC DEVICE THERAPY: CPT

## 2022-06-14 NOTE — FLOWSHEET NOTE
[] [facility]) [facility] &  Therapy  [address]  [phone]  F: [phone] [] [address]  [employer]   [address]  P: [phone]  F: [phone] [] [address]  [address]  P: [phone]  F: [phone] [x] [address]  P: [phone]  F: [phone] [] [address]  [facility]   [address]   [place]: [phone]  F: [phone]      Physical Therapy Daily Treatment Note    Date:  2022  Patient Name:  Marcella Magaña    :  1995  MRN: [id number]  Physician: Torsten Ryan MD                             Insurance: [facility] (Auth after Eval, 30 visit limit, no patient liability) Ruthie Counter # [id number] for PT vs from 22 - 22 for 97110 x 140; 59005 x 61; 50729 x [de-identified]; 49480 x [de-identified]; 97016 x 32 units  Medical Diagnosis: Achilles Tendonitis M76.62                    Rehab Codes: Left Ankle pain (M25. 572)  Onset date: 2018                                     Next Dr's appt. : 2022    Visit# / total visits: 2/16     Cancels/No Shows: 0/0    Subjective:    Pain:  [x] Yes  [] No Location: L achilles  Pain Rating: (0-10 scale) 6/10  Pain altered Tx:  [x] No  [] Yes  Action:  Comments: Pt reports slight increased soreness after last visit, however only lasting about a day. Objective:     Exercises:  Exercise       Reps/ Time Weight/ Level Comments    Seated arch lift 2x10     x   Seated toe yoga 2x15     x   Seated eccentric HR 2x10   Up1sec, down 4sec count x   Standing weight shifts 2x10     x              Mat           clamshells  2x10     x   hip ABD  2x10     x   bridges  2x10     x                         Other:  Manual:  1.  IASTM with sidekick to gastroc, soleus and Achilles  2. Cross friction massage to distal medial Achilles  3. MFR to medial soleus     Vaso-compression: Left foot and ankle 10', 34 degrees, low compression     Access Code: [email or web address]  URL: [email or web address]  Date: 06/09/2022  Prepared by: Liana Ramesh     Exercises  Seated Arch Lifts - 1 x daily - 7 x weekly - 3 sets - 20 reps  Seated Great Toe Extension - 1 x daily - 7 x weekly - 3 sets - 20 reps  Seated Lesser Toes Extension - 1 x daily - 7 x weekly - 3 sets - 20 reps  Standing Anterior Posterior Weight Shift - 1 x daily - 7 x weekly - 2 sets - 15 reps     Specific Instructions for next treatment: MT and Modalities PRN, Review HEP, Hip stability, S/L balance training, Progressive eccentrics as tolerated. Treatment Charges: Mins Units   []  Modalities     [x]  Ther Exercise 30 2   [x]  Manual Therapy 15 1   []  Ther Activities     []  Aquatics     [x]  Vasocompression 10 1   []  Other     Total Treatment time 55 4       Assessment: [x] Progressing toward goals. Initiated tx with STM and IASTM via Sidekick to L gastroc/soleus, and achilles tendon with moderate tenderness reported especially along medial aspect of Achilles tendon. Continued with foot intrinsic strengthening, min cues required for neutral ankle throughout. Pt also able to progress with [place] hip strengthening with good cheryl. Plan to add SL balance next visit. [] No change. [] Other:  [x] Patient would continue to benefit from skilled physical therapy services in order to: The patient is a 23year old with a chief complaint of left Achilles pain and signs and symptoms consistent with a diagnosis of Chronic Achilles tendinitis. She presents with pain, weakness, tenderness and functional limitations. She will benefit from skilled PT to address above deficits.     STG/LTG:  Goals  MET NOT MET ON-  GOING  Details   Date Addressed: 6/09/22           STG: To be met in 6 treatments  ?          1. ? Pain: Decrease pain levels to 4/10 with ADL/sport  []? ?  []??  []??      2. ? ROM: Increase flexibility and AROM limitations throughout to equal bilat to reduce difficulty with ADLs []? ?  []??  []??      3. ? Strength: Increase MMT to 5/5 throughout to ease functional limitations and mobility  []? ?  []??  []??      4. Independent with Home Exercise Programs []? ?  []??  []??      5. Demonstrate knowledge of fall risk prevention  []? ?  []??  []??        []? ?  []??  []??      Date Addressed: 6/09/2022           LTG: To be met in 16 treatments           1. Improve score on assessment tool LEFS from 42% impairment to less than 22% impairment  []??  []??  []??      2. Reduce pain levels to 2/10 or less with ADLs/sport []? ?  []??  []??        []? ?  []??  []??                      Pt. Education:  [x] Yes  [] No  [x] Reviewed Prior HEP/Ed  Method of Education: [] Verbal  [] Demo  [] Written  Comprehension of Education:  [] Verbalizes understanding. [] Demonstrates understanding. [x] Needs review. [] Demonstrates/verbalizes HEP/Ed previously given. Plan: [x] Continue current frequency toward long and short term goals.     [x] Specific Instructions for subsequent treatments: Update HEP next visit    Frequency:  2 x/week for 16 visits      Time In: 12:00pm            Time Out: 1:00pm    Electronically signed by:  Angel Cherry PTA

## 2022-06-17 ENCOUNTER — HOSPITAL ENCOUNTER (OUTPATIENT)
Dept: PHYSICAL THERAPY | Facility: CLINIC | Age: 27
Setting detail: THERAPIES SERIES
Discharge: HOME | End: 2022-06-17
Payer: COMMERCIAL

## 2022-06-17 ENCOUNTER — HOSPITAL ENCOUNTER (OUTPATIENT)
Age: 27
Setting detail: SPECIMEN
Discharge: HOME | End: 2022-06-17

## 2022-06-17 ENCOUNTER — OFFICE VISIT (OUTPATIENT)
Dept: OBGYN CLINIC | Age: 27
End: 2022-06-17
Payer: COMMERCIAL

## 2022-06-17 VITALS
HEIGHT: 62 IN | BODY MASS INDEX: 45.12 KG/M2 | SYSTOLIC BLOOD PRESSURE: 96 MMHG | WEIGHT: 245.2 LBS | DIASTOLIC BLOOD PRESSURE: 70 MMHG

## 2022-06-17 DIAGNOSIS — Z11.3 ROUTINE SCREENING FOR STI (SEXUALLY TRANSMITTED INFECTION): Primary | ICD-10-CM

## 2022-06-17 DIAGNOSIS — Z11.3 ROUTINE SCREENING FOR STI (SEXUALLY TRANSMITTED INFECTION): ICD-10-CM

## 2022-06-17 PROCEDURE — 1036F TOBACCO NON-USER: CPT | Performed by: NURSE PRACTITIONER

## 2022-06-17 PROCEDURE — G8417 CALC BMI ABV UP PARAM F/U: HCPCS | Performed by: NURSE PRACTITIONER

## 2022-06-17 PROCEDURE — 99213 OFFICE O/P EST LOW 20 MIN: CPT | Performed by: NURSE PRACTITIONER

## 2022-06-17 PROCEDURE — G8427 DOCREV CUR MEDS BY ELIG CLIN: HCPCS | Performed by: NURSE PRACTITIONER

## 2022-06-17 RX ORDER — VENLAFAXINE 37.5 MG/1
37.5 TABLET ORAL EVERY MORNING
COMMUNITY

## 2022-06-17 NOTE — PROGRESS NOTES
Vaginitis:   Patient denies abnormal vaginal discharge - just wanting STI cx  Vaginal symptoms include none. Vulvar symptoms include none. STI Risk: Very low risk of STD exposure - denies unprotected intercourse  Discharge described as: normal and physiologic . Menstrual pattern: She has no bleeding w/Mirena  Contraception: IUD     There were no vitals taken for this visit. ALLERGIES:  Flonase  O-  Physical Exam  Genitourinary:     General: Normal vulva. Labia:         Right: No rash, tenderness, lesion or injury. Left: No rash, tenderness, lesion or injury. Vagina: Normal. No foreign body. No vaginal discharge, erythema, tenderness, bleeding or lesions. A.Vaginitis  options. P. Affirm collected and sent to lab  Will treat w/Flagyl for BV or Diflucan if yeast pending results  GC/CT  She was also counseled on her preventative health maintenance recommendations and follow-up.   RTO annual exam and PRN

## 2022-06-17 NOTE — FLOWSHEET NOTE
[] [facility]) [facility] &  Therapy  [address]    [phone]  F: [phone]   [] [address]   [address]  P: [phone]  F: [phone]  [] [place]  [address]  P: [phone]  F: [phone] [x] [address]  P: [phone]  F: [phone]  [] [address]  [address] [facility] [address]   [address]   [place]: [phone]  F: [phone]   [] [facility]  [address] [facility] [address]  [place]: [phone]   F: [phone]     Physical Therapy Cancel/No Show note    Date: 2022  Patient: Shanita Carrasco  : 1995  MRN: [id number]    Cancels/No Shows to date:     For today's appointment patient:    [x]  Cancelled    [] Rescheduled appointment    [] No-show     Reason given by patient:    []  Patient ill    []  Conflicting appointment    [] No transportation      [] Conflict with work    [x] No reason given    [] Weather related    [] HKCMD-87    [] Other:      Comments:        [x] Next appointment was confirmed    Electronically signed by: Jeremiah Cabrera

## 2022-06-18 LAB
CANDIDA SPECIES, DNA PROBE: NEGATIVE
GARDNERELLA VAGINALIS, DNA PROBE: POSITIVE
SOURCE: ABNORMAL
TRICHOMONAS VAGINALIS DNA: NEGATIVE

## 2022-06-20 RX ORDER — METRONIDAZOLE 500 MG/1
500 TABLET ORAL 2 TIMES DAILY
Qty: 14 TABLET | Refills: 0 | Status: SHIPPED | OUTPATIENT
Start: 2022-06-20 | End: 2022-06-27

## 2022-06-21 ENCOUNTER — HOSPITAL ENCOUNTER (OUTPATIENT)
Dept: PHYSICAL THERAPY | Facility: CLINIC | Age: 27
Setting detail: THERAPIES SERIES
Discharge: HOME | End: 2022-06-21
Payer: COMMERCIAL

## 2022-06-21 NOTE — FLOWSHEET NOTE
[] [address]  [address]    [phone]  F: [phone]   [] [address]  [facility] 36   [address]  P: [phone]  F: [phone]  [] [address]  [address] [facility]  P: [phone]  F: [phone] [x] [address]  P: [phone]  F: [phone]  [] [address]  [address] [facility]   [address]   [place]: [phone]  F: [phone]   [] [address]  [place]: [phone]   F: [phone]     Physical Therapy Cancel/No Show note    Date: 2022  Patient: Michelle Horvath  : 1995  MRN: [id number]    Cancels/No Shows to date: 0    For today's appointment patient:    [x]  Cancelled    [] Rescheduled appointment    [] No-show     Reason given by patient:    []  Patient ill    []  Conflicting appointment    [] No transportation      [] Conflict with work    [] No reason given    [] Weather related    [] SUPAY-    [x] Other:      Comments:  Patient stated she is still out of town but will keep  appt.     [x] Next appointment was confirmed    Electronically signed by: Ruperto Barrientos

## 2022-06-27 ENCOUNTER — HOSPITAL ENCOUNTER (OUTPATIENT)
Dept: PHYSICAL THERAPY | Facility: CLINIC | Age: 27
Setting detail: THERAPIES SERIES
Discharge: HOME | End: 2022-06-27
Payer: COMMERCIAL

## 2022-06-27 DIAGNOSIS — J45.20 MILD INTERMITTENT ASTHMA WITHOUT COMPLICATION: ICD-10-CM

## 2022-06-27 PROCEDURE — 97140 MANUAL THERAPY 1/> REGIONS: CPT

## 2022-06-27 PROCEDURE — 97016 VASOPNEUMATIC DEVICE THERAPY: CPT

## 2022-06-27 PROCEDURE — 97110 THERAPEUTIC EXERCISES: CPT

## 2022-06-27 NOTE — FLOWSHEET NOTE
[] [facility] TWELVE-STEP [facility] CENTER &  Therapy  [address]  [phone]  F: [phone] [] [address]  [employer] [address]   [address]  P: [phone]  F: [phone] [] [address]  [address]  P: [phone]  F: [phone] [x] [address]  P: [phone]  F: [phone] [] [address]  [facility]   [address]   [place]: [phone]  F: [phone]      Physical Therapy Daily Treatment Note    Date:  2022  Patient Name:  Esme Vega    :  1995  MRN: [id number]  Physician: Laron Munguia MD                             Insurance: [facility] ([address] [place] [address] Ramirez after Eval, 30 visit limit, no patient liability) Rochel Dandy # [id number] for PT vs from 22 - 22 for 97110 x 140; 84974 x 61; 53923 x [de-identified]; 09649 x [de-identified]; 97016 x 32 units  Medical Diagnosis: Achilles Tendonitis M76.62                    Rehab Codes: Left Ankle pain (M25. 572)  Onset date: 2018                                     Next Dr's appt. : 2022    Visit# / total visits: 3 /16     Cancels/No Shows: 0/0    Subjective:    Pain:  [x] Yes  [] No Location: L achilles  Pain Rating: (0-10 scale) 0/10  Pain altered Tx:  [x] No  [] Yes  Action:  Comments: Pt denies pain upon arrival. Pt states pain at its worst was 6/10 over the last couple of days. Reports compliance with HEP.       Objective:  Exercises:  Exercise       Reps/ Time Weight/ Level Comments    Standing arch lift 2x10  3\" hold   x   Standing  toe yoga 2x15     x   Seated eccentric HR 2x10   Up1sec, down 4sec count x   Standing weight shifts 2x10     x   SLS  2x30\"   x   Eccentric heel raise on [employer]  3x5 L15  x              Mat           clamshells  2x10     x   hip ABD  2x10     x   bridges  2x10     x                         Other:  Manual:  1. IASTM with sidekick to gastroc, soleus and Achilles  2. Cross friction massage to distal medial Achilles  3. MFR to medial soleus     Vaso-compression: Left foot and ankle 10', 34 degrees, low compression     Access Code: [email or web address]  URL: [email or web address]  Date: 06/09/2022  Prepared by: Ruperto Kilgore     Exercises  Seated Arch Lifts - 1 x daily - 7 x weekly - 3 sets - 20 reps  Seated Great Toe Extension - 1 x daily - 7 x weekly - 3 sets - 20 reps  Seated Lesser Toes Extension - 1 x daily - 7 x weekly - 3 sets - 20 reps  Standing Anterior Posterior Weight Shift - 1 x daily - 7 x weekly - 2 sets - 15 reps     Specific Instructions for next treatment: MT and Modalities PRN, Review HEP, Hip stability, S/L balance training, Progressive eccentrics as tolerated. Treatment Charges: Mins Units   []  Modalities     [x]  Ther Exercise 30 2   [x]  Manual Therapy 10 1   []  Ther Activities     []  Aquatics     [x]  Vasocompression 15 1   []  Other     Total Treatment time 55 4       Assessment: [x] Progressing toward goals. Continued with manual, decreased tenderness noted along L achilles tendon. Progressed foot intrinsic strengthening to standing with fair to good cheryl, pt reports \"cramping\" in her foot during. Added SLS with good tolerance. Also progressed with L gastroc eccentric strengthening on [employer] at [facility] with good tolerance. Ended with hip strengthening exercises and vaso for pain management. [] No change. [] Other:  [x] Patient would continue to benefit from skilled physical therapy services in order to: The patient is a 23year old with a chief complaint of left Achilles pain and signs and symptoms consistent with a diagnosis of Chronic Achilles tendinitis. She presents with pain, weakness, tenderness and functional limitations. She will benefit from skilled PT to address above deficits.     STG/LTG:  Goals  MET NOT MET

## 2022-06-28 RX ORDER — ALBUTEROL SULFATE 90 UG/1
2 AEROSOL, METERED RESPIRATORY (INHALATION) EVERY 6 HOURS PRN
Qty: 1 EACH | Refills: 2 | Status: SHIPPED | OUTPATIENT
Start: 2022-06-28

## 2022-06-28 NOTE — TELEPHONE ENCOUNTER
Scott Torres is calling to request a refill on the following medication(s):    Medication Request:  Requested Prescriptions     Pending Prescriptions Disp Refills    albuterol sulfate HFA (PROVENTIL;VENTOLIN;PROAIR) 108 (90 Base) MCG/ACT inhaler       Sig: Inhale 2 puffs into the lungs every 6 hours as needed for Wheezing or Shortness of Breath       Last Visit Date (If Applicable):  5/35/0972    Next Visit Date:    Visit date not found

## 2022-06-29 ENCOUNTER — HOSPITAL ENCOUNTER (OUTPATIENT)
Dept: PHYSICAL THERAPY | Facility: CLINIC | Age: 27
Setting detail: THERAPIES SERIES
Discharge: HOME | End: 2022-06-29
Payer: COMMERCIAL

## 2022-06-29 PROCEDURE — 97110 THERAPEUTIC EXERCISES: CPT

## 2022-06-29 PROCEDURE — 97140 MANUAL THERAPY 1/> REGIONS: CPT

## 2022-06-29 NOTE — FLOWSHEET NOTE
[] [facility]) [facility] CENTER &  Therapy  [address]  [phone]  F: [phone] [] [address]  [facility]   [address]  P: [phone]  F: [phone] [] [address]  [address]  P: [phone]  F: [phone] [x] [address]  P: [phone]  F: [phone] [] [address]  [facility]   [address]   [place]: [phone]  F: [phone]      Physical Therapy Daily Treatment Note    Date:  2022  Patient Name:  Arabella Perkins    :  1995  MRN: [id number]  Physician: Zion De Los Santos MD                             Insurance: [facility] ([address] [place] [address] after Eval, 30 visit limit, no patient liability) Wm [employer] # [id number] for PT vs from 22 - 22 for 97110 x 140; 20545 x 61; 55746 x [de-identified]; 76190 x [de-identified]; 97016 x 32 units  Medical Diagnosis: Achilles Tendonitis M76.62                    Rehab Codes: Left Ankle pain (M25. 572)  Onset date: 2018                                     Next Dr's appt. : 2022    Visit# / total visits: 16     Cancels/No Shows: 0/0    Subjective:    Pain:  [x] Yes  [] No Location: L achilles  Pain Rating: (0-10 scale) 5/10  Pain altered Tx:  [x] No  [] Yes  Action:  Comments: Pt arrives with some ankle soreness, denies any increase in pain after last visit. Pt arrives 13' late, able to accommodate.      Objective:  Exercises:  Exercise       Reps/ Time Weight/ Level Comments    Standing arch lift 2x10  3\" hold   x   Standing  toe yoga 2x15     x   Seated eccentric HR 2x10   Up1sec, down 4sec count x   Standing weight shifts 2x10     x   SLS  2x30\"   x   Eccentric heel raise on [employer]  3x5 L15  x              Mat           clamshells  2x10     x   hip ABD  2x10     x   bridges  2x10     x                         Other:   Manual:  1. IASTM with sidekick to gastroc, soleus and Achilles- held 6/29  2. Cross friction massage to distal medial Achilles  3. MFR to medial soleus     Vaso-compression: Left foot and ankle 10', 34 degrees, low compression- held per pt request     Access Code: [email or web address]  URL: [email or web address]  Date: 06/09/2022  Prepared by: Ruperto Poll     Exercises  Seated Arch Lifts - 1 x daily - 7 x weekly - 3 sets - 20 reps  Seated Great Toe Extension - 1 x daily - 7 x weekly - 3 sets - 20 reps  Seated Lesser Toes Extension - 1 x daily - 7 x weekly - 3 sets - 20 reps  Standing Anterior Posterior Weight Shift - 1 x daily - 7 x weekly - 2 sets - 15 reps     Specific Instructions for next treatment: MT and Modalities PRN, Review HEP, Hip stability, S/L balance training, Progressive eccentrics as tolerated. Treatment Charges: Mins Units   []  Modalities     [x]  Ther Exercise 20 1   [x]  Manual Therapy 10 1   []  Ther Activities     []  Aquatics     []  Vasocompression     []  Other     Total Treatment time 30 2       Assessment: [x] Progressing toward goals. Initiated treatment with standing exercises, pt requiring cueing to activate FHL during SLS to maintain correct foot posture. Continued with manual with tightness noted along medial gastroc and soleus. Pt declined vasocompression this date d/t leaving without pain. [] No change. [] Other:  [x] Patient would continue to benefit from skilled physical therapy services in order to: The patient is a 23year old with a chief complaint of left Achilles pain and signs and symptoms consistent with a diagnosis of Chronic Achilles tendinitis. She presents with pain, weakness, tenderness and functional limitations. She will benefit from skilled PT to address above deficits.     STG/LTG:  Goals  MET NOT MET ON-  GOING  Details   Date Addressed: 6/09/22           STG: To be met in 6 treatments  ?          1. ? Pain: Decrease pain levels to 4/10 with ADL/sport  []? ?  []??  []??      2. ? ROM: Increase flexibility and AROM limitations throughout to equal bilat to reduce difficulty with ADLs []? ?  []??  []??      3. ? Strength: Increase MMT to 5/5 throughout to ease functional limitations and mobility  []? ?  []??  []??      4. Independent with Home Exercise Programs []? ?  []??  []??      5. Demonstrate knowledge of fall risk prevention  []? ?  []??  []??        []? ?  []??  []??      Date Addressed: 6/09/2022           LTG: To be met in 16 treatments           1. Improve score on assessment tool LEFS from 42% impairment to less than 22% impairment  []??  []??  []??      2. Reduce pain levels to 2/10 or less with ADLs/sport []? ?  []??  []??        []? ?  []??  []??                      Pt. Education:  [x] Yes  [] No  [x] Reviewed Prior HEP/Ed  Method of Education: [] Verbal  [] Demo  [] Written  Comprehension of Education:  [] Verbalizes understanding. [] Demonstrates understanding. [x] Needs review. [] Demonstrates/verbalizes HEP/Ed previously given. Plan: [x] Continue current frequency toward long and short term goals.     [x] Specific Instructions for subsequent treatments: Update HEP next visit    Frequency:  2 x/week for 16 visits      Time In: 2149           Time Out: Magdal[address] Snell 81.    Electronically signed by:  Ashlie Mosqueda, PT

## 2022-07-08 ENCOUNTER — HOSPITAL ENCOUNTER (OUTPATIENT)
Dept: PHYSICAL THERAPY | Facility: CLINIC | Age: 27
Setting detail: THERAPIES SERIES
Discharge: HOME | End: 2022-07-08
Payer: COMMERCIAL

## 2022-07-08 PROCEDURE — 97110 THERAPEUTIC EXERCISES: CPT

## 2022-07-08 PROCEDURE — 97140 MANUAL THERAPY 1/> REGIONS: CPT

## 2022-07-08 NOTE — FLOWSHEET NOTE
[] [facility]) - [facility] CENTER &  Therapy  [address]  [phone]  F: [phone] [] [address]  [email or web address] [address]   [address]  P: [phone]  F: [phone] [] [address]  [address]  P: [phone]  F: [phone] [x] [address]  P: [phone]  F: [phone] [] [address]  [facility]   [address]   [place]: [phone]  F: [phone]      Physical Therapy Daily Treatment Note    Date:  2022  Patient Name:  Golden Frederick    :  1995  MRN: [id number]  Physician: Rubia Rodriguez MD                             Insurance: [facility] (Auth after Eval, 30 visit limit, no patient liability) [employer] # [id number] for PT vs from 22 - 22 for 97110 x 140; 48454 x 61; 69593 x [de-identified]; 79665 x [de-identified]; 97016 x 32 units  Medical Diagnosis: Achilles Tendonitis M76.62                    Rehab Codes: Left Ankle pain (M25. 572)  Onset date: 2018                                     Next Dr's appt. : 2022    Visit# / total visits: 16     Cancels/No Shows: 2/2    Subjective:    Pain:  [x] Yes  [] No Location: L achilles  Pain Rating: (0-10 scale) 5/10  Pain altered Tx:  [x] No  [] Yes  Action:  Comments: Pt reported a little soreness and no change in pain.      Objective:  Exercises:  Exercise       Reps/ Time Weight/ Level Comments    Standing arch lift 2x10  3\" hold   x   Standing  toe yoga 2x15     x   Seated eccentric HR 2x10   Up1sec, down 4sec count x   Standing weight shifts 2x10     -   SLS  2x30\"   x   Eccentric heel raise on [employer]  3x10 L15  x              Mat           clamshells  2x10     x   hip ABD  2x10  lime   x   bridges  2x15     x                         Other: Manual:  1. IASTM with sidekick to gastroc, soleus and Achilles- held 6/29  2. Cross friction massage to distal medial Achilles  3. MFR to medial soleus     Vaso-compression: Left foot and ankle 10', 34 degrees, low compression- held per pt request     Access Code: [email or web address]  URL: [email or web address]  Date: 06/09/2022  Prepared by: Thee Lancaster     Exercises  Seated Arch Lifts - 1 x daily - 7 x weekly - 3 sets - 20 reps  Seated Great Toe Extension - 1 x daily - 7 x weekly - 3 sets - 20 reps  Seated Lesser Toes Extension - 1 x daily - 7 x weekly - 3 sets - 20 reps  Standing Anterior Posterior Weight Shift - 1 x daily - 7 x weekly - 2 sets - 15 reps     Specific Instructions for next treatment: MT and Modalities PRN, Review HEP, Hip stability, S/L balance training, Progressive eccentrics as tolerated. Treatment Charges: Mins Units   []  Modalities     [x]  Ther Exercise 28 2   [x]  Manual Therapy 27 2   []  Ther Activities     []  Aquatics     []  Vasocompression     []  Other     Total Treatment time 55 4       Assessment: [x] Progressing toward goals. Pt with good tolerance to activities this date. Progressed reps and resistance as able to improve LE strength with pt noting none to minor increase in pain but was able to complete all reps. Pt received manual on L gastroc to decrease muscle tension with most tension noted laterally. [] No change. [] Other:  [x] Patient would continue to benefit from skilled physical therapy services in order to: The patient is a 23year old with a chief complaint of left Achilles pain and signs and symptoms consistent with a diagnosis of Chronic Achilles tendinitis. She presents with pain, weakness, tenderness and functional limitations. She will benefit from skilled PT to address above deficits.     STG/LTG:  Goals  MET NOT MET ON-  GOING  Details   Date Addressed: 6/09/22           STG: To be met in 6 treatments  ?          1. ? Pain: Decrease pain levels to 4/10 with ADL/sport  []? ?  []??  []??      2. ? ROM: Increase flexibility and AROM limitations throughout to equal bilat to reduce difficulty with ADLs []? ?  []??  []??      3. ? Strength: Increase MMT to 5/5 throughout to ease functional limitations and mobility  []? ?  []??  []??      4. Independent with Home Exercise Programs []? ?  []??  []??      5. Demonstrate knowledge of fall risk prevention  []? ?  []??  []??        []? ?  []??  []??      Date Addressed: 6/09/2022           LTG: To be met in 16 treatments           1. Improve score on assessment tool LEFS from 42% impairment to less than 22% impairment  []??  []??  []??      2. Reduce pain levels to 2/10 or less with ADLs/sport []? ?  []??  []??        []? ?  []??  []??                      Pt. Education:  [x] Yes  [] No  [x] Reviewed Prior HEP/Ed  Method of Education: [] Verbal  [] Demo  [] Written  Comprehension of Education:  [] Verbalizes understanding. [] Demonstrates understanding. [x] Needs review. [] Demonstrates/verbalizes HEP/Ed previously given. Plan: [x] Continue current frequency toward long and short term goals.     [x] Specific Instructions for subsequent treatments: Update HEP next visit    Frequency:  2 x/week for 16 visits      Time In: 1007          Time Out: 1102    Electronically signed by:  Meredith Noel PTA

## 2022-07-11 ENCOUNTER — HOSPITAL ENCOUNTER (OUTPATIENT)
Dept: PHYSICAL THERAPY | Facility: CLINIC | Age: 27
Setting detail: THERAPIES SERIES
Discharge: HOME | End: 2022-07-11
Payer: COMMERCIAL

## 2022-07-11 NOTE — FLOWSHEET NOTE
[] [address]  [address]    [phone]  F: [phone]   [] [address]  [facility] 36   [address]  P: [phone]  F: [phone]  [] [address]  P: [phone]  F: [phone] [x] [address]: [phone]  F: [phone]  [] [address]  [facility]   [address]   [place]: [phone]  F: [phone]   [] [address]  [place]: [phone]   F: [phone]     Physical Therapy Cancel/No Show note    Date: 2022  Patient: Dennison Aschoff  : 1995  MRN: [id number]    Cancels/No Shows to date: 3/2    For today's appointment patient:    [x]  Cancelled    [] Rescheduled appointment    [] No-show     Reason given by patient:    []  Patient ill    []  Conflicting appointment    [] No transportation      [] Conflict with work    [] No reason given    [] Weather related    [] COVID-19    [x] Other:      Comments: Pt states to having a bad day with her foot, and wishes to hold off on PT until Friday.      [] Next appointment was confirmed    Electronically signed by: Elena Mariee PT

## 2022-07-15 ENCOUNTER — HOSPITAL ENCOUNTER (OUTPATIENT)
Dept: PHYSICAL THERAPY | Facility: CLINIC | Age: 27
Setting detail: THERAPIES SERIES
Discharge: HOME | End: 2022-07-15
Payer: COMMERCIAL

## 2022-07-15 PROCEDURE — 97110 THERAPEUTIC EXERCISES: CPT

## 2022-07-15 PROCEDURE — 97016 VASOPNEUMATIC DEVICE THERAPY: CPT

## 2022-07-15 PROCEDURE — 97140 MANUAL THERAPY 1/> REGIONS: CPT

## 2022-07-15 NOTE — FLOWSHEET NOTE
[] [facility]) - [facility]ST[facility] CENTER &  Therapy  [address]  [phone]  F: [phone] [] [address]  [employer]   [address]  P: [phone]  F: [phone] [] [address]  [address]  P: [phone]  F: [phone] [x] [address]  P: [phone]  F: [phone] [] [address]  Psychiatric   [address]   [place]: [phone]  F: [phone]      Physical Therapy Daily Treatment Note    Date:  7/15/2022  Patient Name:  Apollo Santos    :  1995  MRN: [id number]  Physician: Nicolasa Runner, MD                             Insurance: [facility] (Auth after Eval, 30 visit limit, no patient liability) Keily Oneil # [id number] for PT vs from 22 - 22 for 97110 x 140; 42974 x 61; 36336 x [de-identified]; 51486 x [de-identified]; 97016 x 32 units  Medical Diagnosis: Achilles Tendonitis M76.62                    Rehab Codes: Left Ankle pain (M25. 572)  Onset date: 2018                                     Next Dr's appt. : 2022    Visit# / total visits:      Cancels/No Shows: 2/2    Subjective:    Pain:  [x] Yes  [] No Location: L achilles  Pain Rating: (0-10 scale) 6/10  Pain altered Tx:  [x] No  [] Yes  Action:  Comments: Pt states she continues to have pain in L achilles, heel, and points to posterior tib insertion region. Pt states pain is mostly with ambulation.      Objective:  Exercises:  Exercise       Reps/ Time Weight/ Level Comments    Standing arch lift 2x10  3\" hold   x   Standing  toe yoga 2x15     x   Seated eccentric HR 3x10  10# Up1sec, down 4sec count x   Standing weight shifts 2x10     x   SLS  2x30\"   x   Eccentric heel raise on [employer]  3x10 L15  -   3 way reach  3x5   x              Mat clamshells  2x10     -   hip ABD  2x10  lime   -   bridges  2x15     -                         Other:   Manual:  1. STM to L gastroc/soleus   2. IASTM with sidekick to gastroc, soleus and Achilles  3. A/P joint mobes and LAD Grade III     Vaso-compression: Left foot and ankle 10', 34 degrees, low compression- held per pt request     Access Code: [email or web address]  URL: [email or web address]  Date: 06/09/2022  Prepared by: Felecia Minor     Exercises  Seated Arch Lifts - 1 x daily - 7 x weekly - 3 sets - 20 reps  Seated Great Toe Extension - 1 x daily - 7 x weekly - 3 sets - 20 reps  Seated Lesser Toes Extension - 1 x daily - 7 x weekly - 3 sets - 20 reps  Standing Anterior Posterior Weight Shift - 1 x daily - 7 x weekly - 2 sets - 15 reps     Specific Instructions for next treatment: MT and Modalities PRN, Review HEP, Hip stability, S/L balance training, Progressive eccentrics as tolerated. Consider Arch taping? Treatment Charges: Mins Units   []  Modalities     [x]  Ther Exercise 28 2   [x]  Manual Therapy 15 1   []  Ther Activities     []  Aquatics     [x]  Vasocompression 10 1   []  Other     Total Treatment time 53 4       Assessment: [x] Progressing toward goals. Mild to moderate swelling noted in L Focused on L foot intrinsic strengthening, eccentric gastroc strengthening, and L ankle stability this date. Consider arch taping d/t sig pes planus when WB.     [] No change. [] Other:  [x] Patient would continue to benefit from skilled physical therapy services in order to: The patient is a 23year old with a chief complaint of left Achilles pain and signs and symptoms consistent with a diagnosis of Chronic Achilles tendinitis. She presents with pain, weakness, tenderness and functional limitations. She will benefit from skilled PT to address above deficits.     STG/LTG:  Goals  MET NOT MET ON-  GOING  Details   Date Addressed: 6/09/22           STG: To be met in 6 treatments            1. ? Pain:

## 2022-07-19 ENCOUNTER — HOSPITAL ENCOUNTER (OUTPATIENT)
Dept: PHYSICAL THERAPY | Facility: CLINIC | Age: 27
Setting detail: THERAPIES SERIES
Discharge: HOME | End: 2022-07-19
Payer: COMMERCIAL

## 2022-07-19 NOTE — FLOWSHEET NOTE
[] [address]  [address]    [phone]  F: [phone]   [] [address]  [facility] 36   [address]  P: [phone]  F: [phone]  [] [address]  [address] [facility]  P: [phone]  F: [phone] [x] [address]  P: [phone]  F: [phone]  [] [address]  [address] [facility] [address]   [address]   [place]: [phone]  F: [phone]   [] [place] [address] [facility] [address]  [place]: [phone]   F: [phone]     Physical Therapy Cancel/No Show note    Date: 2022  Patient: Jim Fish  : 1995  MRN: [id number]    Cancels/No Shows to date:     For today's appointment patient:    [x]  Cancelled    [x] Rescheduled appointment    [] No-show     Reason given by patient:    []  Patient ill    [x]  Conflicting appointment    [] No transportation      [] Conflict with work    [] No reason given    [] Weather related    [] COVID-19    [] Other:      Comments:       [x] Next appointment was confirmed    Electronically signed by: Pepe Wu

## 2022-07-22 ENCOUNTER — HOSPITAL ENCOUNTER (OUTPATIENT)
Dept: PHYSICAL THERAPY | Facility: CLINIC | Age: 27
Setting detail: THERAPIES SERIES
Discharge: HOME | End: 2022-07-22
Payer: COMMERCIAL

## 2022-07-22 PROCEDURE — 97110 THERAPEUTIC EXERCISES: CPT

## 2022-07-22 PROCEDURE — 97140 MANUAL THERAPY 1/> REGIONS: CPT

## 2022-07-22 NOTE — FLOWSHEET NOTE
[] [facility]) [facility] CENTER &  Therapy  [address]  [phone]  F: [phone] [] [address]  [facility]   [address]  P: [phone]  F: [phone] [] [address]  [address] [facility]  P: [phone]  F: [phone] [x] [address]  P: [phone]  F: [phone] [] [address]  [facility]   [address]   [place]: [phone]  F: [phone]      Physical Therapy Daily Treatment Note    Date:  2022  Patient Name:  Natalie Phillips    :  1995  MRN: [id number]  Physician: Pat Her MD                             Insurance: [facility] ([address] [place] [address] Ramriez after Eval, 30 visit limit, no patient liability) Oma Roblero # [id number] for PT vs from 22 - 22 for 97110 x 140; 67428 x 61; 60796 x [de-identified]; 69236 x [de-identified]; 97016 x 32 units  Medical Diagnosis: Achilles Tendonitis M76.62                    Rehab Codes: Left Ankle pain (M25. 572)  Onset date: 2018                                     Next Dr's appt. : 2022    Visit# / total visits:      Cancels/No Shows: 2/2    Subjective:    Pain:  [x] Yes  [] No Location: L achilles  Pain Rating: (0-10 scale) 5/10  Pain altered Tx:  [x] No  [] Yes  Action:  Comments: Pt states she had pain relief for several days after last session.     Objective:  Exercises:  Exercise       Reps/ Time Weight/ Level Comments    Standing arch lift 2x10  3\" hold   x   Standing  toe yoga 2x15     x   Straight leg eccentric HR 2x10  Up1sec, down 4sec count x   Standing weight shifts 2x10     x   Bent knee eccentric HR 2x10   x   Calf stretch 2x30\"   x   3 way reach  3x5                 Mat           clamshells  2x10     x   hip ABD  2x15     x   bridges  x15     x    S/L Bridge presents with pain, weakness, tenderness and functional limitations. She will benefit from skilled PT to address above deficits. STG/LTG:  Goals  MET NOT MET ON-  GOING  Details   Date Addressed: 6/09/22           STG: To be met in 6 treatments            1. ? Pain: Decrease pain levels to 4/10 with ADL/sport  []  [x]  []      2. ? ROM: Increase flexibility and AROM limitations throughout to equal bilat to reduce difficulty with ADLs [x]  []  []      3. ? Strength: Increase MMT to 5/5 throughout to ease functional limitations and mobility  []  [x]  []      4. Independent with Home Exercise Programs [x]  []  []      5. Demonstrate knowledge of fall risk prevention  [x]  []  []        []  []  []      Date Addressed: 6/09/2022           LTG: To be met in 16 treatments           1. Improve score on assessment tool LEFS from 42% impairment to less than 22% impairment  []  []  []      2. Reduce pain levels to 2/10 or less with ADLs/sport []  []  []        []  []  []                      Pt. Education:  [x] Yes  [] No  [x] Reviewed Prior HEP/Ed  Method of Education: [] Verbal  [] Demo  [] Written  Comprehension of Education:  [] Verbalizes understanding. [] Demonstrates understanding. [x] Needs review. [] Demonstrates/verbalizes HEP/Ed previously given. Plan: [x] Continue current frequency toward long and short term goals.     [x] Specific Instructions for subsequent treatments: Update HEP next visit    Frequency:  2 x/week for 16 visits      Time In: 9:00am          Time Out: 9:55am    Electronically signed by:  Sonali Blanton PT

## 2022-07-26 ENCOUNTER — HOSPITAL ENCOUNTER (OUTPATIENT)
Dept: PHYSICAL THERAPY | Facility: CLINIC | Age: 27
Setting detail: THERAPIES SERIES
Discharge: HOME | End: 2022-07-26
Payer: COMMERCIAL

## 2022-07-26 PROCEDURE — 97140 MANUAL THERAPY 1/> REGIONS: CPT

## 2022-07-26 PROCEDURE — 97110 THERAPEUTIC EXERCISES: CPT

## 2022-07-26 NOTE — FLOWSHEET NOTE
[] [facility])  CENTER &  Therapy  [address]  [phone]  F: [phone] [] [address]  [facility]   [address]  P: [phone]  F: [phone] [] [address]  [address]  P: [phone]  F: [phone] [x] [address]  P: [phone]  F: [phone] [] [address]  [facility]   [address]   [place]: [phone]  F: [phone]      Physical Therapy Daily Treatment Note    Date:  2022  Patient Name:  Abdiel Beasley    :  1995  MRN: [id number]  Physician: Rommel Pérez MD                             Insurance: [employer] ([address] [place] [address] Ramirez after Eval, 30 visit limit, no patient liability) Amootoon Bee # [id number] for PT vs from 22 - 22 for 97110 x 140; 32570 x 61; 31643 x [de-identified]; 19317 x [de-identified]; 97016 x 32 units  Medical Diagnosis: Achilles Tendonitis M76.62                    Rehab Codes: Left Ankle pain (M25. 572)  Onset date: 2018                                     Next 's appt. : 2022    Visit# / total visits:      Cancels/No Shows: 2/2    Subjective:    Pain:  [x] Yes  [] No Location: L achilles  Pain Rating: (0-10 scale) 4/10  Pain altered Tx:  [x] No  [] Yes  Action:  Comments: Pt states L posterior calcaneous and distal posterior tibialis pain is a little better today rating at 4/10.      Objective:  Exercises:  Exercise       Reps/ Time Weight/ Level Comments    Standing arch lift 2x10  3\" hold   x   Standing  toe yoga 2x15     x   Straight leg eccentric HR 2x10  Up1sec, down 4sec count x   Standing weight shifts 2x10     x   Bent knee eccentric HR 2x10   x   Calf stretch 2x30\"   x   3 way reach  3x5   x              Mat           clamshells  2x10     x   hip ABD 2x15     x   bridges  x15     x    S/L Bridge 2x5 ea     x              Other:   Manual:  1. STM to L gastroc/soleus   2. IASTM with sidekick to gastroc, soleus and Achilles  3. A/P joint mobes and LAD Grade III     Vaso-compression: Left foot and ankle 10', 34 degrees, low compression- held per pt request     Access Code: [email or web address]  URL: [email or web address]/  Date: 06/09/2022  Prepared by: Paramjit Ebbs     Exercises  Seated Arch Lifts - 1 x daily - 7 x weekly - 3 sets - 20 reps  Seated Great Toe Extension - 1 x daily - 7 x weekly - 3 sets - 20 reps  Seated Lesser Toes Extension - 1 x daily - 7 x weekly - 3 sets - 20 reps  Standing Anterior Posterior Weight Shift - 1 x daily - 7 x weekly - 2 sets - 15 reps     Access Code: [email or web address]  URL: [email or web address]/  Date: 07/22/2022  Prepared by: Paramjit Ebbs    Exercises  Standing Eccentric Heel Raise - 1 x daily - 7 x weekly - 3 sets - 10 reps  Standing Eccentric Calf Raise with Bent Knee - 1 x daily - 7 x weekly - 3 sets - 10 reps    Specific Instructions for next treatment: MT and Modalities PRN, Review HEP, Hip stability, S/L balance training, Progressive eccentrics as tolerated. Consider Arch taping? Treatment Charges: Mins Units   []  Modalities     [x]  Ther Exercise 40 3   [x]  Manual Therapy 15 1   []  Ther Activities     []  Aquatics     []  Vasocompression     []  Other     Total Treatment time 55 4       Assessment: [x] Progressing toward goals. Continued with manual followed by eccentric strengthening of B gastroc, B gastroc stretching, and foot intrinsic strengthening. Ended with mat exercises for L hip strengthening with good tolerance. [] No change. [] Other:  [x] Patient would continue to benefit from skilled physical therapy services in order to: The patient is a 23year old with a chief complaint of left Achilles pain and signs and symptoms consistent with a diagnosis of Chronic Achilles tendinitis.   She presents with pain, weakness, tenderness and functional limitations. She will benefit from skilled PT to address above deficits. STG/LTG:  Goals  MET NOT MET ON-  GOING  Details   Date Addressed: 6/09/22           STG: To be met in 6 treatments            1. ? Pain: Decrease pain levels to 4/10 with ADL/sport  []  [x]  []      2. ? ROM: Increase flexibility and AROM limitations throughout to equal bilat to reduce difficulty with ADLs [x]  []  []      3. ? Strength: Increase MMT to 5/5 throughout to ease functional limitations and mobility  []  [x]  []      4. Independent with Home Exercise Programs [x]  []  []      5. Demonstrate knowledge of fall risk prevention  [x]  []  []        []  []  []      Date Addressed: 6/09/2022           LTG: To be met in 16 treatments           1. Improve score on assessment tool LEFS from 42% impairment to less than 22% impairment  [x]  []  []      2. Reduce pain levels to 2/10 or less with ADLs/sport [x]  []  []        []  []  []                      Pt. Education:  [x] Yes  [] No  [x] Reviewed Prior HEP/Ed  Method of Education: [] Verbal  [] Demo  [] Written  Comprehension of Education:  [] Verbalizes understanding. [] Demonstrates understanding. [x] Needs review. [] Demonstrates/verbalizes HEP/Ed previously given. Plan: [x] Continue current frequency toward long and short term goals.     [x] Specific Instructions for subsequent treatments: Update HEP next visit    Frequency:  2 x/week for 16 visits      Time In: 8:40am          Time Out: 9:35am    Electronically signed by:  Krystyna Yañez PTA

## 2022-07-29 ENCOUNTER — HOSPITAL ENCOUNTER (OUTPATIENT)
Dept: PHYSICAL THERAPY | Facility: CLINIC | Age: 27
Setting detail: THERAPIES SERIES
Discharge: HOME | End: 2022-07-29
Payer: COMMERCIAL

## 2022-07-29 PROCEDURE — 97140 MANUAL THERAPY 1/> REGIONS: CPT

## 2022-07-29 PROCEDURE — 97110 THERAPEUTIC EXERCISES: CPT

## 2022-07-29 NOTE — FLOWSHEET NOTE
[] [facility]) [facility] CENTER &  Therapy  [address]  [phone]  F: [phone] [] [address]  [facility] [address]   [address]  P: [phone]  F: [phone] [] [address]  [address]  P: [phone]  F: [phone] [x] [address]  P: [phone]  F: [phone] [] [address]  [facility]   [address]   [place]: [phone]  F: [phone]      Physical Therapy Daily Treatment Note    Date:  2022  Patient Name:  Gill Song    :  1995  MRN: [id number]  Physician: Carmina Parr MD                             Insurance: [facility] ([address] [place] [address] Ramirez after Eval, 30 visit limit, no patient liability) Arabella Mendosavicki # [id number] for PT vs from 22 - 22 for 97110 x 140; 54511 x 61; 23671 x [de-identified]; 80809 x [de-identified]; 97016 x 32 units  Medical Diagnosis: Achilles Tendonitis M76.62                    Rehab Codes: Left Ankle pain (M25. 572)  Onset date: 2018                                     Next 's appt. : 2022    Visit# / total visits:      Cancels/No Shows: 2/2    Subjective:    Pain:  [x] Yes  [] No Location: L achilles  Pain Rating: (0-10 scale) 2-3/10  Pain altered Tx:  [x] No  [] Yes  Action:  Comments: Pt states L posterior calcaneous and medial midfoot is sore currently.      Objective:  Exercises:  Exercise       Reps/ Time Weight/ Level Comments    Standing arch lift 2x10  3\" hold   x   Standing toe yoga 2x15     x   Straight leg eccentric HR 2x10  Up1sec, down 4sec count x   Standing weight shifts 2x10     x   Bent knee eccentric HR 2x10   x   Calf stretch 2x30\"   x   3 way reach  3x5  One UE assist to assist w/ neutral foot, arch lift x              Mat           clamshells  2x10 lime x   hip ABD  2x10 lime   x   bridges  x15       S/L Bridge 2x10 ea   Neutral foot  x              Other:   Manual:  1. STM to L gastroc/soleus   2. IASTM with sidekick to gastroc, soleus and Achilles  3. A/P joint mobes and LAD Grade III     Vaso-compression: Left foot and ankle 10', 34 degrees, low compression- held per pt request     Access Code: [email or web address]  URL: [email or web address]  Date: 06/09/2022  Prepared by: Bri Mitlucian     Exercises  Seated Arch Lifts - 1 x daily - 7 x weekly - 3 sets - 20 reps  Seated Great Toe Extension - 1 x daily - 7 x weekly - 3 sets - 20 reps  Seated Lesser Toes Extension - 1 x daily - 7 x weekly - 3 sets - 20 reps  Standing Anterior Posterior Weight Shift - 1 x daily - 7 x weekly - 2 sets - 15 reps     Access Code: [email or web address]  URL: [email or web address]  Date: 07/22/2022  Prepared by: Bri Mitts    Exercises  Standing Eccentric Heel Raise - 1 x daily - 7 x weekly - 3 sets - 10 reps  Standing Eccentric Calf Raise with Bent Knee - 1 x daily - 7 x weekly - 3 sets - 10 reps    Specific Instructions for next treatment: MT and Modalities PRN, Review HEP, Hip stability, S/L balance training, Progressive eccentrics as tolerated. Consider Arch taping? Treatment Charges: Mins Units   []  Modalities     [x]  Ther Exercise 30 2   [x]  Manual Therapy 15 1   []  Ther Activities     []  Aquatics     []  Vasocompression     []  Other     Total Treatment time 45 3       Assessment: [x] Progressing toward goals. Continued with manual followed by eccentric strengthening. Ended with mat exercises for hip strengthening with good tolerance. [] No change. [] Other:  [x] Patient would continue to benefit from skilled physical therapy services in order to: The patient is a 23year old with a chief complaint of left Achilles pain and signs and symptoms consistent with a diagnosis of Chronic Achilles tendinitis.   She presents with pain, weakness, tenderness and functional limitations. She will benefit from skilled PT to address above deficits. STG/LTG:  Goals  MET NOT MET ON-  GOING  Details   Date Addressed: 6/09/22           STG: To be met in 6 treatments            1. ? Pain: Decrease pain levels to 4/10 with ADL/sport  []  [x]  []      2. ? ROM: Increase flexibility and AROM limitations throughout to equal bilat to reduce difficulty with ADLs [x]  []  []      3. ? Strength: Increase MMT to 5/5 throughout to ease functional limitations and mobility  []  [x]  []      4. Independent with Home Exercise Programs [x]  []  []      5. Demonstrate knowledge of fall risk prevention  [x]  []  []        []  []  []      Date Addressed: 6/09/2022           LTG: To be met in 16 treatments           1. Improve score on assessment tool LEFS from 42% impairment to less than 22% impairment  [x]  []  []      2. Reduce pain levels to 2/10 or less with ADLs/sport [x]  []  []        []  []  []                      Pt. Education:  [x] Yes  [] No  [x] Reviewed Prior HEP/Ed  Method of Education: [] Verbal  [] Demo  [] Written  Comprehension of Education:  [] Verbalizes understanding. [] Demonstrates understanding. [x] Needs review. [] Demonstrates/verbalizes HEP/Ed previously given. Plan: [x] Continue current frequency toward long and short term goals.     [x] Specific Instructions for subsequent treatments: Update HEP next visit    Frequency:  2 x/week for 16 visits      Time In: 1000         Time Out: 2661    Electronically signed by:  Nathaniel Candelaria PTA

## 2022-08-01 ENCOUNTER — HOSPITAL ENCOUNTER (OUTPATIENT)
Dept: PHYSICAL THERAPY | Facility: CLINIC | Age: 27
Setting detail: THERAPIES SERIES
Discharge: HOME | End: 2022-08-01
Payer: COMMERCIAL

## 2022-08-01 PROCEDURE — 97140 MANUAL THERAPY 1/> REGIONS: CPT

## 2022-08-01 PROCEDURE — 97110 THERAPEUTIC EXERCISES: CPT

## 2022-08-01 NOTE — FLOWSHEET NOTE
[] [facility]) [facility] CENTER &  Therapy  [address]  [phone]  F: [phone] [] [address]  [place] [address]   [address]  P: [phone]  F: [phone] [] [address]  [address]  P: [phone]  F: [phone] [x] [address]  P: [phone]  F: [phone] [] [address]  [facility]   [address]   [place]: [phone]  F: [phone]      Physical Therapy Daily Treatment Note    Date:  2022  Patient Name:  Selena Driscoll    :  1995  MRN: [id number]  Physician: Lolita Kahn MD                             Insurance: [facility] ([facility] Eval, 30 visit limit, no patient liability) Bernie Quezada # [id number] for PT vs from 22 - 22 for 97110 x 140; 18746 x 61; 94486 x [de-identified]; 58116 x [de-identified]; 97016 x 32 units  Medical Diagnosis: Achilles Tendonitis M76.62                    Rehab Codes: Left Ankle pain (M25. 572)  Onset date: 2018                                     Next 's appt. : 2022    Visit# / total visits: 10/16     Cancels/No Shows: 2/2    Subjective:    Pain:  [x] Yes  [] No Location: L achilles  Pain Rating: (0-10 scale) 4/10  Pain altered Tx:  [x] No  [] Yes  Action:  Comments: Pt states L Achilles is improving. It fels better when she oes her exercises.     Objective:  Exercises:  Exercise       Reps/ Time Weight/ Level Comments    Standing arch lift 2x10  3\" hold   x   Standing toe yoga 2x15     x   Straight leg eccentric HR 2x15  Up1sec, down 4sec count x   Standing weight shifts 2x10     x   Bent knee eccentric HR 2x15   x   Calf stretch 2x30\"   x   3 way reach  3x5   x              Mat           clamshells  2x10 blue   x   hip ABD  2x10 blue   x   bridges  x15 S/L Bridge 2x10 ea   Neutral foot  x              Other:   Manual:  1. STM to L gastroc/soleus   2. IASTM with sidekick to gastroc, soleus and Achilles  3. A/P joint mobes and LAD Grade III     Vaso-compression: Left foot and ankle 10', 34 degrees, low compression- held per pt request     Access Code: [email or web address]  URL: [email or web address]  Date: 06/09/2022  Prepared by: Rejeana Bedford     Exercises  Seated Arch Lifts - 1 x daily - 7 x weekly - 3 sets - 20 reps  Seated Great Toe Extension - 1 x daily - 7 x weekly - 3 sets - 20 reps  Seated Lesser Toes Extension - 1 x daily - 7 x weekly - 3 sets - 20 reps  Standing Anterior Posterior Weight Shift - 1 x daily - 7 x weekly - 2 sets - 15 reps     Access Code: [email or web address]  URL: [email or web address]  Date: 07/22/2022  Prepared by: Rejeana Bedford    Exercises  Standing Eccentric Heel Raise - 1 x daily - 7 x weekly - 3 sets - 10 reps  Standing Eccentric Calf Raise with Bent Knee - 1 x daily - 7 x weekly - 3 sets - 10 reps    Specific Instructions for next treatment: MT and Modalities PRN, Review HEP, Hip stability, S/L balance training, Progressive eccentrics as tolerated. Consider Arch taping? Treatment Charges: Mins Units   []  Modalities     [x]  Ther Exercise 30 2   [x]  Manual Therapy 15 1   []  Ther Activities     []  Aquatics     []  Vasocompression     []  Other     Total Treatment time 45 3       Assessment: [x] Progressing toward goals. Continued with manual followed by hip strengthening, standing stability exercises and eccentric calf/Achilles strengthening. No increase in pain with exercises, improving pain, strength and balance. [] No change. [] Other:  [x] Patient would continue to benefit from skilled physical therapy services in order to: The patient is a 23year old with a chief complaint of left Achilles pain and signs and symptoms consistent with a diagnosis of Chronic Achilles tendinitis.   She presents with pain, weakness, tenderness and functional limitations. She will benefit from skilled PT to address above deficits. STG/LTG:  Goals  MET NOT MET ON-  GOING  Details   Date Addressed: 6/09/22           STG: To be met in 6 treatments            1. ? Pain: Decrease pain levels to 4/10 with ADL/sport  [x]  []  []      2. ? ROM: Increase flexibility and AROM limitations throughout to equal bilat to reduce difficulty with ADLs [x]  []  []      3. ? Strength: Increase MMT to 5/5 throughout to ease functional limitations and mobility  []  [x]  []      4. Independent with Home Exercise Programs [x]  []  []      5. Demonstrate knowledge of fall risk prevention  [x]  []  []        []  []  []      Date Addressed: 6/09/2022           LTG: To be met in 16 treatments           1. Improve score on assessment tool LEFS from 42% impairment to less than 22% impairment  []  [x]  []      2. Reduce pain levels to 2/10 or less with ADLs/sport []  [x]  []        []  []  []                      Pt. Education:  [x] Yes  [] No  [x] Reviewed Prior HEP/Ed  Method of Education: [] Verbal  [] Demo  [] Written  Comprehension of Education:  [] Verbalizes understanding. [] Demonstrates understanding. [x] Needs review. [] Demonstrates/verbalizes HEP/Ed previously given. Plan: [x] Continue current frequency toward long and short term goals. [x] Specific Instructions for subsequent treatments: Continue with progressive ankle and hip strengthening, balance and eccentric calf/Achilles loading.     Frequency:  2 x/week for 16 visits      Time In: 1030         Time Out: 1115    Electronically signed by:  Trudy Moscoso, PT

## 2022-08-02 ENCOUNTER — OFFICE VISIT (OUTPATIENT)
Dept: ORTHOPEDIC SURGERY | Age: 27
End: 2022-08-02
Payer: COMMERCIAL

## 2022-08-02 VITALS — WEIGHT: 245 LBS | BODY MASS INDEX: 45.08 KG/M2 | RESPIRATION RATE: 16 BRPM | OXYGEN SATURATION: 100 % | HEIGHT: 62 IN

## 2022-08-02 DIAGNOSIS — M76.62 ACHILLES TENDINITIS OF LEFT LOWER EXTREMITY: Primary | ICD-10-CM

## 2022-08-02 PROCEDURE — G8417 CALC BMI ABV UP PARAM F/U: HCPCS | Performed by: ORTHOPAEDIC SURGERY

## 2022-08-02 PROCEDURE — 1036F TOBACCO NON-USER: CPT | Performed by: ORTHOPAEDIC SURGERY

## 2022-08-02 PROCEDURE — 99212 OFFICE O/P EST SF 10 MIN: CPT | Performed by: ORTHOPAEDIC SURGERY

## 2022-08-02 PROCEDURE — G8427 DOCREV CUR MEDS BY ELIG CLIN: HCPCS | Performed by: ORTHOPAEDIC SURGERY

## 2022-08-02 NOTE — PROGRESS NOTES
[address] [place] [address] AND SPORTS MEDICINE  [facility]  [address] [address]  Dept: [phone]    Ambulatory Orthopedic Consult      CHIEF COMPLAINT:    Chief Complaint   Patient presents with    Foot Pain     Left        HISTORY OF PRESENT ILLNESS:      The patient is a 23 y.o. female who is being seen for consultation and evaluation of pain at the posterior aspect of the left Achilles tendon/heel, which began atraumatically in 2018. The pain is described mainly with mechanical terms (dull/sharp/throbbing). The pain is worse with activity and better with rest. The patient reports a progressive course. The patient has tried:      [x]  rest/activity modification          [x]  NSAIDs      []  opiates      []  orthotics        []  change in shoes   []  home exercises  []  physical therapy      []  CAM boot     []  brace:    []  injection:       []  surgery:      INTERVAL HISTORY 12/11/2020:  She is seen again today in the office for follow up of a previous issue (as above). Since being seen last, the patient is doing better. She is ambulating today using a CAM boot. The location and quality of the pain have not significantly changed since the last visit. INTERVAL HISTORY 4/9/2021:  She is seen again today in the office for follow up of a previous issue (as above). Since being seen last, the patient is doing worse. She is ambulating today using a CAM boot. History is obtained through the EMR today, as well as directly from the patient. Around 2/23/2021, the patient felt extreme pain and experienced a pop upon getting up from the couch, and she was seen by our sports medicine doctor, Dr. Abundio Bernal, after that incidence. INTERVAL HISTORY 4/30/2021:  She is seen again today in the office for follow up of imaging as below. Since being seen last, the patient is doing about the same overall.  At today's visit, she is using a brace/boot. History is obtained today from:   [x]  the patient     [x]  EMR     []  one family member/friend    []  multiple family members/friends    []  other:      INTERVAL HISTORY 4/28/2022:  She is seen again today in the office for follow up of a previous issue (as above). Since being seen last, the patient is doing about the same overall. At today's visit, she is not using a brace or assistive device. History is obtained today from:   [x]  the patient     []  EMR     []  one family member/friend    []  multiple family members/friends    []  other:      INTERVAL HISTORY 8/2/2022:  She is seen again today in the office for follow up of a previous issue (as above). Since being seen last, the patient is doing better. At today's visit, she is not using a brace or assistive device. History is obtained today from:   [x]  the patient     []  EMR     []  one family member/friend    []  multiple family members/friends    []  other:        REVIEW OF SYSTEMS:  Musculoskeletal: See HPI for pertinent positives     Past Medical History:    She  has a past medical history of Allergic rhinitis, Anxiety, Asthma, Benign neoplasm of skin, site unspecified, Depression, and PTSD (post-traumatic stress disorder). Past Surgical History:    She  has a past surgical history that includes laparoscopy (12-31-14); [place] tooth extraction; and intrauterine device insertion (11/17/2020).      Current Medications:     Current Outpatient Medications:     albuterol sulfate HFA (PROVENTIL;VENTOLIN;PROAIR) 108 (90 Base) MCG/ACT inhaler, Inhale 2 puffs into the lungs every 6 hours as needed for Wheezing or Shortness of Breath, Disp: 1 each, Rfl: 2    venlafaxine (EFFEXOR) 37.5 MG tablet, Take 37.5 mg by mouth 3 times daily, Disp: , Rfl:     naproxen (EC NAPROSYN) 500 MG EC tablet, Take 1 tablet by mouth 2 times daily as needed for Pain, Disp: 60 tablet, Rfl: 0    valACYclovir (VALTREX) 500 MG tablet, take 1 tablet by mouth twice a day for 7 days, Disp: 14 tablet, Rfl: 5    AMOXICILLIN-POT CLAVULANATE PO, , Disp: , Rfl:     busPIRone (BUSPAR) 10 MG tablet, Take 10 mg by mouth 3 times daily, Disp: , Rfl:     traZODone (DESYREL) 50 MG tablet, Take 50 mg by mouth nightly, Disp: , Rfl:     diclofenac sodium (VOLTAREN) 1 % GEL, Apply 2 g topically 2 times daily, Disp: 60 g, Rfl: 0    prazosin (MINIPRESS) 2 MG capsule, Take 2 mg by mouth nightly , Disp: , Rfl:      Allergies:    Flonase [fluticasone]    Family History:  family history includes Diabetes in her maternal grandfather, maternal grandmother, paternal grandfather, and paternal grandmother; Heart Attack in her maternal aunt and paternal grandmother; High Blood Pressure in her father and mother; Kidney Disease in her maternal aunt; Ovarian Cancer in her maternal grandmother; Stroke in her paternal grandmother. Social History:   Social History     Occupational History    Not on file   Tobacco Use    Smoking status: Never    Smokeless tobacco: Never   Vaping Use    Vaping Use: Never used   Substance and Sexual Activity    Alcohol use: Never     Comment: social    Drug use: Yes     Types: Marijuana Candiss Littler)     Comment: hasn't used since august 2014    Sexual activity: Yes     Birth control/protection: I.U.D. 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 Skin: No erythema/ulcers. Intact. Neurovascular Status:  Grossly neurovascularly intact throughout   Tenderness to Palpation:    -Heel valgus, pes planus      LLE:  Vascular: Limb well perfused, compartments soft/compressible. Skin: No erythema/ulcers. Intact.    Neurovascular Status:  Grossly neurovascularly intact throughout Tenderness to Palpation: Achilles tendon insertion--improved (previously also at the distal Achilles tendon)  -Heel valgus, pes planus    -Pain with resisted plantarflexion  -No nodules/thickening of the tendon palpated, no palpable gap of the Achilles tendon palpated      RADIOLOGY:   8/2/2022 No new radiology images today. Prior images reviewed for reference. MRI images and radiology report reviewed, as below:    Normal appearance of the Achilles tendon with very mild marrow edema at its   calcaneal insertion that may represent stress related changes.          -Independent interpretation: Subtle edema adjacent to the insertion of the Achilles tendon, possibly representing very small undersurface tearing. FINDINGS:  Three weightbearing views (AP, Mortise, and Lateral) of the left ankle and three weightbearing views (AP, Oblique, Lateral) of the left foot were obtained in the office today and reviewed, revealing no acute fracture, dislocation, or radioopaque foreign body/tumor. The ankle mortise is maintained with no widening of the clear spaces. Overall alignment is satisfactory. IMPRESSION:  No acute fracture/dislocation. Electronically signed by Roberta Abraham MD      ASSESSMENT AND PLAN:  Body mass index is 44.81 kg/m². She has a history of left Achilles insertional tendinitis, as well as a history of an exacerbation that was concerning for a partial rupture, sustained on 2/23/2021. MRI from 4/9/2021 shows both mild marrow edema at the calcaneal insertion as well as subtle edema adjacent to the insertion of the Achilles tendon, possibly representing very small undersurface tear. At this time, she does report that she is doing better, and is currently happy with her level of pain/activity. Notably, she has the past medical history as above, including but not limited to the following:  Asthma, anxiety/PTSD/depression .      We had another discussion about the likely diagnosis and its natural history, physical exam and imaging findings, as well as treatment options in detail. Surgically, we have discussed a possible left Achilles tendon debridement with bony resection. We discussed the expected postoperative course, including the relevant weightbearing restrictions/immobilization. We again discussed the risks of surgery, including incomplete pain relief. Currently, she reports that she does not have any limitations at work, and reports that she is able to do more in terms of her activity level. For these reasons, we decided to continue with conservative management for the time being. Orders/referrals were placed as below at today's visit. The patient may ambulate in a regular shoe, with heel lifts as needed to avoid pain provoking activity. The patient may use a night splint as needed. I recommended that the patient continue physical therapy/home exercises. She may take her prescribed oral NSAIDs as needed. All questions were answered and the patient agrees with the above plan. The patient will return to clinic in the future as needed. In the future, we may consider a repeat left ankle MRI if we are considering surgery. No follow-ups on file. No orders of the defined types were placed in this encounter. No orders of the defined types were placed in this encounter. Nilam Arnold MD  Orthopedic Surgery, Foot and Ankle        Please excuse any typos/errors, as this note was created with the assistance of voice recognition software. While intending to generate a document that actually reflects the content of the visit, the document can still have some errors including those of syntax and sound-a-like substitutions which may escape proof reading. In such instances, actual meaning can be extrapolated by context.

## 2022-08-05 ENCOUNTER — HOSPITAL ENCOUNTER (OUTPATIENT)
Dept: PHYSICAL THERAPY | Facility: CLINIC | Age: 27
Setting detail: THERAPIES SERIES
Discharge: HOME | End: 2022-08-05
Payer: COMMERCIAL

## 2022-08-05 NOTE — FLOWSHEET NOTE
[] [facility]) [facility] &  Therapy  [address]    [phone]  F: [phone]   [] [address]  [facility]   [address]  P: [phone]  F: [phone]  [] [address]  [address]  P: [phone]  F: [phone] [x] [address]  P: [phone]  F: [phone]  [] [address]  [address] [facility] [address]   [address]   [place]: [phone]  F: [phone]   [] [facility]  [address]  [place]: [phone]   F: [phone]     Physical Therapy Cancel/No Show note    Date: 2022  Patient: Jairo Guzman  : 1995  MRN: [id number]    Cancels/No Shows to date:     For today's appointment patient:    [x]  Cancelled    [] Rescheduled appointment    [] No-show     Reason given by patient:    []  Patient ill    []  Conflicting appointment    [] No transportation      [] Conflict with work    [x] No reason given    [] Weather related    [] COVID-19    [] Other:      Comments:       [x] Next appointment was confirmed    Electronically signed by: Jessica Montana PTA

## 2022-08-12 ENCOUNTER — HOSPITAL ENCOUNTER (OUTPATIENT)
Dept: PHYSICAL THERAPY | Facility: CLINIC | Age: 27
Setting detail: THERAPIES SERIES
Discharge: HOME | End: 2022-08-12
Payer: COMMERCIAL

## 2022-08-12 NOTE — FLOWSHEET NOTE
[] [address]  [address]    [phone]  F: [phone]   [] [address]  [facility] [address]   [address]  P: [phone]  F: [phone]  [] [address]  [address] [facility]  P: [phone]  F: [phone] [x] [address]  P: [phone]  F: [phone]  [] [address]  [address] [facility] [address]   [address]   [place]: [phone]  F: [phone]   [] [address]  [place]: [phone]   F: [phone]     Physical Therapy Cancel/No Show note    Date: 2022  Patient: Romana May  : 1995  MRN: [id number]    Cancels/No Shows to date: 6/3    For today's appointment patient:    [x]  Cancelled    [] Rescheduled appointment    [] No-show     Reason given by patient:    []  Patient ill    []  Conflicting appointment    [] No transportation      [] Conflict with work    [x] No reason given    [] Weather related    [] COVID-19    [] Other:     Comments:        [x] Next appointment was confirmed    Electronically signed by: Stefani Tidwell PTA

## 2022-08-17 ENCOUNTER — APPOINTMENT (OUTPATIENT)
Dept: PHYSICAL THERAPY | Facility: CLINIC | Age: 27
End: 2022-08-17
Payer: COMMERCIAL

## 2022-10-03 ENCOUNTER — OFFICE VISIT (OUTPATIENT)
Dept: FAMILY MEDICINE CLINIC | Age: 27
End: 2022-10-03
Payer: COMMERCIAL

## 2022-10-03 VITALS
HEART RATE: 93 BPM | BODY MASS INDEX: 46.78 KG/M2 | WEIGHT: 254.2 LBS | SYSTOLIC BLOOD PRESSURE: 122 MMHG | OXYGEN SATURATION: 96 % | HEIGHT: 62 IN | TEMPERATURE: 97 F | DIASTOLIC BLOOD PRESSURE: 82 MMHG

## 2022-10-03 DIAGNOSIS — Z23 NEED FOR IMMUNIZATION AGAINST INFLUENZA: ICD-10-CM

## 2022-10-03 DIAGNOSIS — Z00.00 ENCOUNTER FOR WELL ADULT EXAM WITHOUT ABNORMAL FINDINGS: Primary | ICD-10-CM

## 2022-10-03 DIAGNOSIS — R14.0 ABDOMINAL DISTENTION, NON-GASEOUS: ICD-10-CM

## 2022-10-03 DIAGNOSIS — Z13.31 DEPRESSION SCREENING: ICD-10-CM

## 2022-10-03 DIAGNOSIS — L30.9 ECZEMA, UNSPECIFIED TYPE: ICD-10-CM

## 2022-10-03 PROCEDURE — 1036F TOBACCO NON-USER: CPT | Performed by: NURSE PRACTITIONER

## 2022-10-03 PROCEDURE — 96160 PT-FOCUSED HLTH RISK ASSMT: CPT | Performed by: NURSE PRACTITIONER

## 2022-10-03 PROCEDURE — 90471 IMMUNIZATION ADMIN: CPT | Performed by: NURSE PRACTITIONER

## 2022-10-03 PROCEDURE — G8417 CALC BMI ABV UP PARAM F/U: HCPCS | Performed by: NURSE PRACTITIONER

## 2022-10-03 PROCEDURE — 99395 PREV VISIT EST AGE 18-39: CPT | Performed by: NURSE PRACTITIONER

## 2022-10-03 PROCEDURE — 99214 OFFICE O/P EST MOD 30 MIN: CPT | Performed by: NURSE PRACTITIONER

## 2022-10-03 PROCEDURE — 90674 CCIIV4 VAC NO PRSV 0.5 ML IM: CPT | Performed by: NURSE PRACTITIONER

## 2022-10-03 PROCEDURE — G8482 FLU IMMUNIZE ORDER/ADMIN: HCPCS | Performed by: NURSE PRACTITIONER

## 2022-10-03 PROCEDURE — G8427 DOCREV CUR MEDS BY ELIG CLIN: HCPCS | Performed by: NURSE PRACTITIONER

## 2022-10-03 ASSESSMENT — PATIENT HEALTH QUESTIONNAIRE - PHQ9
8. MOVING OR SPEAKING SO SLOWLY THAT OTHER PEOPLE COULD HAVE NOTICED. OR THE OPPOSITE, BEING SO FIGETY OR RESTLESS THAT YOU HAVE BEEN MOVING AROUND A LOT MORE THAN USUAL: 0
9. THOUGHTS THAT YOU WOULD BE BETTER OFF DEAD, OR OF HURTING YOURSELF: 0
10. IF YOU CHECKED OFF ANY PROBLEMS, HOW DIFFICULT HAVE THESE PROBLEMS MADE IT FOR YOU TO DO YOUR WORK, TAKE CARE OF THINGS AT HOME, OR GET ALONG WITH OTHER PEOPLE: 0
7. TROUBLE CONCENTRATING ON THINGS, SUCH AS READING THE NEWSPAPER OR WATCHING TELEVISION: 1
SUM OF ALL RESPONSES TO PHQ QUESTIONS 1-9: 7
5. POOR APPETITE OR OVEREATING: 3
SUM OF ALL RESPONSES TO PHQ QUESTIONS 1-9: 7
2. FEELING DOWN, DEPRESSED OR HOPELESS: 1
1. LITTLE INTEREST OR PLEASURE IN DOING THINGS: 1
SUM OF ALL RESPONSES TO PHQ QUESTIONS 1-9: 7
SUM OF ALL RESPONSES TO PHQ9 QUESTIONS 1 & 2: 2
6. FEELING BAD ABOUT YOURSELF - OR THAT YOU ARE A FAILURE OR HAVE LET YOURSELF OR YOUR FAMILY DOWN: 1
3. TROUBLE FALLING OR STAYING ASLEEP: 0
4. FEELING TIRED OR HAVING LITTLE ENERGY: 0
SUM OF ALL RESPONSES TO PHQ QUESTIONS 1-9: 7

## 2022-10-03 ASSESSMENT — ENCOUNTER SYMPTOMS: ABDOMINAL DISTENTION: 1

## 2022-10-03 NOTE — PROGRESS NOTES
Well Adult Note  Name: Patricia Oliveira Date: 10/3/2022   MRN: [id number] Sex: Female   Age: 33 y.o. Ethnicity: Non- / Non    : 1995 Race: Black / Deyanira Anderson is here for well adult exam.  History:    Exercise: Yes  Diet: Eats a balanced diet  Smoker: No; does smoke marijuana   Alcohol:  Socially  Averages: 6-8 hours    Health Concerns:    Reports for the last 2 years she has noticed a bulging in her abdominal area. Says she usually feels it when she is bloated. Denies pain, nausea or vomiting. No changes in appetite. Denies diarrhea or constipation. Years of itchy scalp with skin discoloration and flaking since she was 16years old. She now says its developing behind her ears. She has used OTC products for dandruff without relief. Review of Systems   Gastrointestinal:  Positive for abdominal distention. Skin:  Positive for rash. All other systems reviewed and are negative. Allergies   Allergen Reactions    Flonase [Fluticasone] Hives         Prior to Visit Medications    Medication Sig Taking?  Authorizing Provider   albuterol sulfate HFA (PROVENTIL;VENTOLIN;PROAIR) 108 (90 Base) MCG/ACT inhaler Inhale 2 puffs into the lungs every 6 hours as needed for Wheezing or Shortness of Breath Yes Jessy Landau, APRN - CNP   valACYclovir (VALTREX) 500 MG tablet take 1 tablet by mouth twice a day for 7 days Yes ARIAN Greene CNP   busPIRone (BUSPAR) 15 MG tablet Take 15 mg by mouth 2 times daily Yes Historical Provider, MD   traZODone (DESYREL) 100 MG tablet Take 100 mg by mouth nightly Yes Historical Provider, MD   diclofenac sodium (VOLTAREN) 1 % GEL Apply 2 g topically 2 times daily Yes Jessy Landau, APRN - CNP   prazosin (MINIPRESS) 2 MG capsule Take 2 mg by mouth nightly  Yes Historical Provider, MD   venlafaxine (EFFEXOR) 37.5 MG tablet Take 37.5 mg by mouth every morning  Historical Provider, MD         Past Medical History: Diagnosis Date    Allergic rhinitis     Anxiety     Asthma     Benign neoplasm of skin, site unspecified     Depression     PTSD (post-traumatic stress disorder)        Past Surgical History:   Procedure Laterality Date    INTRAUTERINE DEVICE INSERTION  11/17/2020    Mirena    LAPAROSCOPY  12-31-14    with peritoneal washings    WISDOM TOOTH EXTRACTION           Family History   Problem Relation Age of Onset    High Blood Pressure Mother     Kidney Disease Maternal Aunt     Heart Attack Maternal Aunt     Diabetes Maternal Grandmother     Ovarian Cancer Maternal Grandmother     Diabetes Maternal Grandfather     Diabetes Paternal Grandmother     Stroke Paternal Grandmother     Heart Attack Paternal Grandmother     Diabetes Paternal Grandfather     High Blood Pressure Father        Social History     Tobacco Use    Smoking status: Never    Smokeless tobacco: Never   Vaping Use    Vaping Use: Never used   Substance Use Topics    Alcohol use: Never     Comment: social    Drug use: Yes     Types: Marijuana (Weed)     Comment: hasn't used since august 2014       Objective   /82   Pulse 93   Temp 97 °F (36.1 °C) (Temporal)   Ht 5' 2\" (1.575 m)   Wt 254 lb 3.2 oz (115.3 kg)   SpO2 96%   BMI 46.49 kg/m²   Wt Readings from Last 3 Encounters:   10/03/22 254 lb 3.2 oz (115.3 kg)   08/02/22 245 lb (111.1 kg)   06/17/22 245 lb 3.2 oz (111.2 kg)     There were no vitals filed for this visit. Physical Exam  Vitals reviewed. Constitutional:       Appearance: She is obese. HENT:      Head: Normocephalic and atraumatic. Eyes:      Pupils: Pupils are equal, round, and reactive to light. Cardiovascular:      Rate and Rhythm: Normal rate and regular rhythm. Pulses: Normal pulses. Heart sounds: Normal heart sounds. Pulmonary:      Effort: Pulmonary effort is normal.      Breath sounds: Normal breath sounds. Abdominal:      General: Bowel sounds are normal.      Palpations: Abdomen is soft. Comments: Mass felt with deep palpation. Denies pain or tenderness when palpated. Musculoskeletal:         General: Normal range of motion. Skin:     General: Skin is warm and dry. Comments: Scaling noted to hairline on forehead and behind bilateral ears. Hypopigmentation at hairline on forehead noted   Neurological:      Mental Status: She is alert and oriented to person, place, and time. Psychiatric:         Mood and Affect: Mood normal.         Behavior: Behavior normal.         Assessment   Plan   1. Encounter for well adult exam without abnormal findings  -     CBC with Auto Differential; Future  -     Comprehensive Metabolic Panel; Future  -     TSH with Reflex; Future  -     Lipid, Fasting; Future  2. Need for immunization against influenza  -     Influenza, FLUCELVAX, (age 10 mo+), IM, Preservative Free, 0.5 mL  3. Depression screening  -     MD DEPRESSION SCREEN ANNUAL  PHQ-9 Total Score: 7 (10/3/2022  3:27 PM)  Thoughts that you would be better off dead, or of hurting yourself in some way: 0 (10/3/2022  3:27 PM)   4. Abdominal distention, non-gaseous  -     CT ABDOMEN W CONTRAST;  Future  -     MD OFFICE/OUTPATIENT ESTABLISHED MOD MDM 30-39 MIN  5. Eczema, unspecified type  -     [facility] - Marcie Kennedy MD, Dermatology, [address] [place] [address] OFFICE/OUTPATIENT ESTABLISHED MOD MDM 30-39 MIN         Personalized Preventive Plan   Current Health Maintenance Status  Immunization History   Administered Date(s) Administered    COVID-19, MODERNA BLUE border, Primary or Immunocompromised, (age 12y+), IM, 100 mcg/0.5mL 05/03/2021, 06/07/2021    COVID-19, [place] Vaccine, Vaccine Unspecified 05/03/2021, 06/07/2021    DTaP vaccine 05/07/1998, 05/07/1998    Influenza Virus Vaccine 09/17/2020    Influenza, FLUARIX, FLULAVAL, Janann Just (age 10 mo+) AND AFLURIA, (age 1 y+), PF, 0.5mL 11/09/2019, 09/17/2020, 09/17/2020    Influenza, FLUCELVAX, (age 10 mo+), MDCK, PF, 0.5mL 10/03/2022    MMR 01/09/1998, 01/09/1998 Meningococcal MCV4P (Menactra) 12/10/2013    Pneumococcal Polysaccharide ([id number]) 12/14/2020        Health Maintenance   Topic Date Due    HPV vaccine (1 - 2-dose series) Never done    COVID-19 Vaccine (3 - Booster for Moderna series) 11/07/2021    Depression Monitoring  03/24/2022    DTaP/Tdap/Td vaccine (2 - Tdap) 10/06/2024 (Originally 8/31/2015)    Pap smear  11/05/2024    Meningococcal (ACWY) vaccine  Completed    Flu vaccine  Completed    Pneumococcal 0-64 years Vaccine  Completed    HIV screen  Completed    Hepatitis A vaccine  Aged Out    Hepatitis B vaccine  Aged Out    Hib vaccine  Aged Out    Varicella vaccine  Discontinued    Hepatitis C screen  Discontinued     Recommendations for Preventive Services Due: see orders and patient instructions/AVS.    Return in about 1 year (around 10/3/2023) for annual physical.

## 2022-10-11 ENCOUNTER — OFFICE VISIT (OUTPATIENT)
Dept: DERMATOLOGY | Age: 27
End: 2022-10-11
Payer: COMMERCIAL

## 2022-10-11 VITALS
BODY MASS INDEX: 45.82 KG/M2 | OXYGEN SATURATION: 99 % | HEIGHT: 62 IN | WEIGHT: 249 LBS | DIASTOLIC BLOOD PRESSURE: 70 MMHG | TEMPERATURE: 97.6 F | SYSTOLIC BLOOD PRESSURE: 106 MMHG | HEART RATE: 71 BPM

## 2022-10-11 DIAGNOSIS — L40.9 PSORIASIS: Primary | ICD-10-CM

## 2022-10-11 DIAGNOSIS — L83 ACANTHOSIS NIGRICANS: ICD-10-CM

## 2022-10-11 DIAGNOSIS — L65.8 TRACTION ALOPECIA: ICD-10-CM

## 2022-10-11 PROCEDURE — G8417 CALC BMI ABV UP PARAM F/U: HCPCS | Performed by: PHYSICIAN ASSISTANT

## 2022-10-11 PROCEDURE — 1036F TOBACCO NON-USER: CPT | Performed by: PHYSICIAN ASSISTANT

## 2022-10-11 PROCEDURE — 99204 OFFICE O/P NEW MOD 45 MIN: CPT | Performed by: PHYSICIAN ASSISTANT

## 2022-10-11 PROCEDURE — G8482 FLU IMMUNIZE ORDER/ADMIN: HCPCS | Performed by: PHYSICIAN ASSISTANT

## 2022-10-11 PROCEDURE — G8427 DOCREV CUR MEDS BY ELIG CLIN: HCPCS | Performed by: PHYSICIAN ASSISTANT

## 2022-10-11 RX ORDER — KETOCONAZOLE 20 MG/ML
SHAMPOO TOPICAL
Qty: 120 ML | Refills: 2 | Status: SHIPPED | OUTPATIENT
Start: 2022-10-11

## 2022-10-11 NOTE — PROGRESS NOTES
Dermatology Patient Note  [address]  [facility]  Dept: [phone]  Dept Fax: [phone]      VISITDATE: 10/11/2022   REFERRING PROVIDER: No ref. provider found      Mary Anne Bernard is a 33 y.o. female  who presents today in the office for:    New Patient (Scalp discoloration around forehead, back of scalp, and behind the ears. Pt states it dos itch at times but no other symptoms. Xyears )      HISTORY OF PRESENT ILLNESS:  As above. Also notes anterior scalp line hair loss. Admits to traction based hair styles.     MEDICAL PROBLEMS:  Patient Active Problem List    Diagnosis Date Noted    Mirena IUD in place 11/17/20 11/17/2020     Due out 11/17/25      PCOS (polycystic ovarian syndrome) 11/05/2020    Irregular menses 09/21/2020    Anxiety 09/15/2020    Severe depression ([facility] [address] 09/15/2020    Mild intermittent asthma without complication 10/29/5321    Chronic post-traumatic stress disorder (PTSD) 01/25/2019    Seasonal allergies 05/23/2017    Pelvic pain in female 12/31/2014       CURRENT MEDICATIONS:   Current Outpatient Medications   Medication Sig Dispense Refill    triamcinolone (KENALOG) 0.1 % ointment Apply to rash twice daily (not face, armpit or groin) 80 g 1    ketoconazole (NIZORAL) 2 % shampoo Apply 3-4 times weekly to scalp, leave on for five minutes prior to washing off 120 mL 2    albuterol sulfate HFA (PROVENTIL;VENTOLIN;PROAIR) 108 (90 Base) MCG/ACT inhaler Inhale 2 puffs into the lungs every 6 hours as needed for Wheezing or Shortness of Breath 1 each 2    venlafaxine (EFFEXOR) 37.5 MG tablet Take 37.5 mg by mouth every morning      valACYclovir (VALTREX) 500 MG tablet take 1 tablet by mouth twice a day for 7 days 14 tablet 5    busPIRone (BUSPAR) 15 MG tablet Take 15 mg by mouth 2 times daily      traZODone (DESYREL) 100 MG tablet Take 100 mg by mouth nightly      diclofenac sodium (VOLTAREN) 1 % GEL Apply 2 g topically 2 times daily 60 g 0    prazosin (MINIPRESS) 2 MG capsule Take 2 mg by mouth nightly        Current Facility-Administered Medications   Medication Dose Route Frequency Provider Last Rate Last Admin    levonorgestrel (MIRENA) IUD 52 mg 1 each  1 each IntraUTERine Continuous Mace Couch, DO   1 each at 11/17/20 6573       ALLERGIES:   Allergies   Allergen Reactions    Flonase [Fluticasone] Hives       SOCIAL HISTORY:  Social History     Tobacco Use    Smoking status: Never    Smokeless tobacco: Never   Substance Use Topics    Alcohol use: Never     Comment: social       Pertinent ROS:  Review of Systems  Skin: Denies any new changing, growing or bleeding lesions or rashes except as described in the HPI   Constitutional: Denies fevers, chills, and malaise. PHYSICAL EXAM:   /70   Pulse 71   Temp 97.6 °F (36.4 °C)   Ht 5' 2\" (1.575 m)   Wt 249 lb (112.9 kg)   SpO2 99%   BMI 45.54 kg/m²     The patient is generally well appearing, well nourished, alert and conversational. Affect is normal.    Cutaneous Exam:  Physical Exam  Focused exam of face, nails, and scalp was performed  Artificial nails, unable to examine nails    Facial covering was not removed during examination. Diagnoses/exam findings/medical history pertinent to this visit are listed below:    Assessment:   Diagnosis Orders   1. Psoriasis  triamcinolone (KENALOG) 0.1 % ointment    ketoconazole (NIZORAL) 2 % shampoo      2. Acanthosis nigricans        3. Traction alopecia             Plan:  1. Psoriasis  - chronic illness with progression and/or exacerbation   - triamcinolone (KENALOG) 0.1 % ointment; Apply to rash twice daily (not face, armpit or groin)  Dispense: 80 g; Refill: 1  - ketoconazole (NIZORAL) 2 % shampoo; Apply 3-4 times weekly to scalp, leave on for five minutes prior to washing off  Dispense: 120 mL; Refill: 2    2.  Acanthosis nigricans  - D/W pt dietary changes (no eating within 3-4 hours of going to bed, no sugary beverages, and portion reduction) and increasing activity (walking)    3. Traction alopecia  - D/C traction based, tight hair styles      RTC 3M    Future Appointments   Date Time Provider Kamron Mae   10/14/2022  1:00 PM STA CT SCAN RM 1 [facility] CT SCAN [facility] Radiolog   12/1/2022  2:45 PM DO Sriram Shelley OB/Gyn [facility]   1/12/2023  8:15 AM Lorena Seip, PA-C  derm [facility]   10/4/2023  8:40 AM Michelle Banco, APRN - CNP [facility]         There are no Patient Instructions on file for this visit.       Electronically signed by Lorena Seip, PA-C on 10/11/22 at 8:26 AM EDT

## 2022-10-20 ENCOUNTER — TELEPHONE (OUTPATIENT)
Dept: FAMILY MEDICINE CLINIC | Age: 27
End: 2022-10-20

## 2022-10-20 DIAGNOSIS — R14.0 ABDOMINAL DISTENTION, NON-GASEOUS: Primary | ICD-10-CM

## 2022-10-25 ENCOUNTER — HOSPITAL ENCOUNTER (OUTPATIENT)
Dept: CT IMAGING | Age: 27
Discharge: HOME | End: 2022-10-27
Payer: COMMERCIAL

## 2022-10-25 DIAGNOSIS — R14.0 ABDOMINAL DISTENTION, NON-GASEOUS: ICD-10-CM

## 2022-10-25 DIAGNOSIS — Z32.00 POSSIBLE PREGNANCY: ICD-10-CM

## 2022-10-25 LAB — HCG(URINE) PREGNANCY TEST: NEGATIVE

## 2022-10-25 PROCEDURE — 81025 URINE PREGNANCY TEST: CPT

## 2022-10-25 PROCEDURE — 74177 CT ABD & PELVIS W/CONTRAST: CPT

## 2022-10-25 PROCEDURE — 6360000004 HC RX CONTRAST MEDICATION: Performed by: NURSE PRACTITIONER

## 2022-10-25 PROCEDURE — A4641 RADIOPHARM DX AGENT NOC: HCPCS | Performed by: NURSE PRACTITIONER

## 2022-10-25 PROCEDURE — 2580000003 HC RX 258: Performed by: NURSE PRACTITIONER

## 2022-10-25 RX ORDER — 0.9 % SODIUM CHLORIDE 0.9 %
80 INTRAVENOUS SOLUTION INTRAVENOUS ONCE
Status: COMPLETED | OUTPATIENT
Start: 2022-10-25 | End: 2022-10-25

## 2022-10-25 RX ORDER — SODIUM CHLORIDE 0.9 % (FLUSH) 0.9 %
10 SYRINGE (ML) INJECTION PRN
Status: DISCONTINUED | OUTPATIENT
Start: 2022-10-25 | End: 2022-10-28 | Stop reason: HOSPADM

## 2022-10-25 RX ADMIN — SODIUM CHLORIDE, PRESERVATIVE FREE 10 ML: 5 INJECTION INTRAVENOUS at 15:22

## 2022-10-25 RX ADMIN — IOPAMIDOL 75 ML: 755 INJECTION, SOLUTION INTRAVENOUS at 15:22

## 2022-10-25 RX ADMIN — BARIUM SULFATE 450 ML: 20 SUSPENSION ORAL at 15:22

## 2022-10-25 RX ADMIN — SODIUM CHLORIDE 80 ML: 9 INJECTION, SOLUTION INTRAVENOUS at 15:22

## 2022-12-01 ENCOUNTER — OFFICE VISIT (OUTPATIENT)
Dept: OBGYN CLINIC | Age: 27
End: 2022-12-01
Payer: COMMERCIAL

## 2022-12-01 ENCOUNTER — HOSPITAL ENCOUNTER (OUTPATIENT)
Age: 27
Setting detail: SPECIMEN
Discharge: HOME | End: 2022-12-01

## 2022-12-01 VITALS
BODY MASS INDEX: 45.14 KG/M2 | DIASTOLIC BLOOD PRESSURE: 81 MMHG | SYSTOLIC BLOOD PRESSURE: 138 MMHG | WEIGHT: 246.8 LBS | HEART RATE: 110 BPM

## 2022-12-01 DIAGNOSIS — R87.612 LOW GRADE SQUAMOUS INTRAEPITHELIAL LESION ON CYTOLOGIC SMEAR OF CERVIX (LGSIL): Primary | ICD-10-CM

## 2022-12-01 DIAGNOSIS — Z01.419 WOMEN'S ANNUAL ROUTINE GYNECOLOGICAL EXAMINATION: ICD-10-CM

## 2022-12-01 PROCEDURE — 99395 PREV VISIT EST AGE 18-39: CPT | Performed by: STUDENT IN AN ORGANIZED HEALTH CARE EDUCATION/TRAINING PROGRAM

## 2022-12-01 PROCEDURE — G8482 FLU IMMUNIZE ORDER/ADMIN: HCPCS | Performed by: STUDENT IN AN ORGANIZED HEALTH CARE EDUCATION/TRAINING PROGRAM

## 2022-12-01 NOTE — PROGRESS NOTES
[facility]stetrics and Gynecology  [address] [facility], [address]      [address]  2022                       Primary Care Physician: ARIAN Hoffman CNP    CC:   Chief Complaint   Patient presents with    Annual Exam     Repeat pap          HPI: [address] is a 33 y.o. female  No LMP recorded. Patient has had an implant. The patient was seen and examined. She is here for an annual visit. She is complaining of nothing. She has an IUD in place and denies bleeding. Her bowel habits are regular. She denies any bloating. She denies dysuria. She denies urinary leaking. She denies vaginal discharge. She is sexually active with single partner, contraception - IUD. She uses IUD for contraception and is not desiring pregnancy.     Depression Screen: Symptoms of decreased mood absent  Symptoms of anhedonia absent  **If either question is answered in a  positive fashion then complete the PHQ9 Scoring Evaluation and make the appropriate referral**    REVIEW OF SYSTEMS:   Constitutional: negative fever, negative chills  HEENT: negative visual disturbances, negative headaches  Respiratory: negative dyspnea, negative cough  Cardiovascular: negative chest pain,  negative palpitations  Gastrointestinal: negative abdominal pain, negative RUQ pain, negative N/V, negative diarrhea, negative constipation  Genitourinary: negative dysuria, negative vaginal discharge  Dermatological: negative rash  Hematologic: negative bruising  Immunologic/Lymphatic: negative recent illness, negative recent sick contact  Musculoskeletal: negative back pain, negative myalgias, negative arthralgias  Neurological:  negative dizziness, negative weakness  Behavior/Psych: negative depression, negative anxiety      GYNECOLOGICAL HISTORY:  Age of Menarche: 16  Age of Menopause: N/A     STD History: no past history    Permanent Sterilization: no  Reversible Birth Control: yes - Mirena history includes Diabetes in her maternal grandfather, maternal grandmother, paternal grandfather, and paternal grandmother; Heart Attack in her maternal aunt and paternal grandmother; High Blood Pressure in her father and mother; Kidney Disease in her maternal aunt; Ovarian Cancer in her maternal grandmother; Stroke in her paternal grandmother. SOCIAL HISTORY:   reports that she has never smoked. She has never used smokeless tobacco. She reports current drug use. Drug: Marijuana Gurpreet Goldberg). She reports that she does not drink alcohol. HEALTH MAINTENANCE:  Immunization status: up to date and documented    ROUTINE GYN HEALTH MAINTENANCE  Mammogram: N/A  Colonoscopy: N/A  Pap Smears: Last 11/21 LSIL w/  +HPV. Colpo negative. Next due now. Family Planning: Mirena IUD  DEXA: N/A    VITALS:  Vitals:    12/01/22 1458   BP: 138/81   Site: Right Upper Arm   Position: Sitting   Cuff Size: Large Adult   Pulse: (!) 110   Weight: 246 lb 12.8 oz (111.9 kg)                                                                                                                                                                                                   PHYSICAL EXAM:   General Appearance: Appears healthy. Alert; in no acute distress. Pleasant. Skin: Skin color, texture, turgor normal. No rashes or lesions. HEENT: normocephalic and atraumatic, Thyroid normal to inspection and palpation  Respiratory: Normal expansion. Clear to auscultation. No rales, rhonchi, or wheezing.   Cardiovascular: normal rate, normal S1 and S2, no gallops, intact distal pulses and no carotid bruits  Breast:  (Chest): normal appearance, no masses or tenderness  Abdomen: soft, non-tender, non-distended, no right upper quadrant tenderness and no CVA tenderness  Pelvic Exam:   External genitalia: General appearance; normal, Hair distribution; normal, Lesions absent  Urinary system: urethral meatus normal  Vaginal: normal mucosa, no discharge  Cervix: normal appearing cervix without discharge or lesions, IUD strings visualized   Adnexa: non-palpable  Uterus: normal single, nontender  Rectal Exam: exam declined by patient  Musculoskeletal: no gross abnormalities  Extremities: non-tender BLE and non-edematous  Psych:  oriented to time, place and person     DATA:  No results found for this visit on 22. ASSESSMENT & PLAN:    Kaila Youssef is a 33 y.o. female  No LMP recorded. Patient has had an implant. Annual:  Mammogram: N/A  Colonoscopy: N/A  Pap Smears: Last  LSIL w/  +HPV. Colpo negative. Next due now. Family Planning: Mirena IUD  DEXA: N/A    Patient Active Problem List    Diagnosis Date Noted    Mirena IUD in place 20     Due out 26      PCOS (polycystic ovarian syndrome) 2020    Irregular menses 2020    Anxiety 09/15/2020    Severe depression ([facility] [address] 09/15/2020    Mild intermittent asthma without complication     Chronic post-traumatic stress disorder (PTSD) 2019    Seasonal allergies 2017    Pelvic pain in female 2014       Return in about 1 year (around 2023) for Annual.  No Patient Care Coordination Note on file. Counseling Completed:    Discussed need for repeat pap as per American Society for Colposcopy and Cervical Pathology guidelines. Discussed need for mammograms every 1 year, If >44 yo and last mammogram was negative. Discussed Calcium and Vitamin D dosing. Discussed need for colonoscopy screening as well as onset for bone density testing. Discussed birth control and barrier recommendations. Discussed STD counseling and prevention. Discussed Gardisil counseling for all patients 10-37 yo. Hereditary Breast, Ovarian, Colon and Uterine Cancer screening discussed. Tobacco & Secondary smoke risks discussed; with recommendation for cessation and avoidance. Routine health maintenance per patients PCP discussed. Diagnosis Orders   1.  Low grade squamous intraepithelial lesion on cytologic smear of cervix (LGSIL)  PAP Smear      2.  Women's annual routine gynecological examination  PAP Smear           DO José Miguel Garcia OB/GYN, [facility]  12/1/2022, 3:17 PM

## 2024-11-04 NOTE — FLOWSHEET NOTE
[] [facility]) - [facility] &  Therapy  [address]  [phone]  F: [phone] [] [address]  [employer] [address]   [address]  P: [phone]  F: [phone] [] [address] &  Therapy  [address]  P: [phone]  F: [phone] [x] [address]  P: [phone]  F: [phone] [] [address]  [facility]   [address]   [place]: [phone]  F: [phone]      Physical Therapy Daily Treatment Note    Date:  2020  Patient Name:  Mick Brown    :  1995  MRN: [id number]  Physician: Dr. Ortega Reef: Deborah Burroughs (30 visits)  Medical Diagnosis: Achilles Tendinitis of LLE                     Rehab Codes: M76.62  Onset date: ~10/1/18             Next 's appt.: 20  Visit# / total visits:     Cancels/No Shows: 1/0    Subjective:    Pain:  [x] Yes  [] No Location: L Achilles  Pain Rating: (0-10 scale) 2/10  Pain altered Tx:  [x] No  [] Yes  Action:  Comments: Pt reports she has had the last 3 days off of work and that she has been off of her feet more which may have contributed to decreased L heel pain.      Objective:  Exercises: CAM Boot for all standing exercises       Exercise  L Achilles Tendinitis     Reps/ Time Weight/ Level Comments    NuStep 5' Level 5  x          3 way hip  2x10 orange OKC x   Step ups 20x 8\", 6'' Fwd- 8'', lat- 6'' x              Seated BAPs  20xea Level 3  CW/CCW -   Seated MOBO  20xea    1/3, 2/4 rocks  -   Seated heel/toe raises  2x10  Towel under heel  x   *Foot Dome  10x, 10\"    x   *Toe Yoga  20x     x   Toe splay  20x   -                     *Long sitting gastroc stretch 3x30\"   x   Long sitting soleus stretch 3x30''   x   *4 way ankle Tband  2x15 blue  x   Clamshells  2x10    bilat  x   SL hip ABD  10x  bilat  x   Other:* Given as HEP  Manual: MFR and [employer]- ball attachment; to L gastroc, tightness noted globally     Vasocompression: 15' moderate compression L ankle supine with bolster - pt deferred this vs     Specific Instructions for next treatment: Progress ankle stabilization as tolerated;           Treatment Charges: Mins Units   []  Modalities     [x]  Ther Exercise 40 3   [x]  Manual Therapy 20 1   []  Ther Activities     []  Aquatics     []  Vasocompression     []  Other     Total Treatment time 60 4       Assessment: [x] Progressing toward goals. Progressed with glut med strengthening via clamshells and SL hip ABD to address valgus movement at knees which may attribute to pronation at the ankles. Pt demos sig improvement with toe yoga this date. Increased reps to 2x15 with 4 way ankle with good cheryl, mils shakiness persists with IV/EV motions. Also added seated heel/toe raises with towel under heel during heel raises with fair cheryl. Pt reports slight increased heel pain after seated toe raises, therefore held from HEP. Held vaso today; no swelling noted and minimal pain reported after therapeutic exercises. Added clamshells and SL hip ABD to HEP with HO issued. [] No change. [] Other:  [x] Patient would continue to benefit from skilled physical therapy services in order to decrease L gastroc tightness and increase L ankle strength and stability. STG/LTG  STG: (to be met in 10 treatments)  1. ? Pain: Decrease pain levels to   2. ? ROM: Increase flexibility and AROM limitations throughout to equal bilat to reduce difficulty with ADLs  3. ? Strength: Increase L ankle strength to 4+/5 globally  4. ? Function: Pt to progress ambulation out of CAM boot as tolerated and allowed per Dr. Aminta Johnsno  5. Independent with Home Exercise Programs     LTG: (to be met in 20 treatments)  1.  Improve score on assessment tool from 37.5% impaired to 15% impaired demonstrating an improvement in ADLs  2. Reduce pain levels to 0/10                    Patient goals: To have decreased L ankle pain  Pt. Education:  [x] Yes  [] No  [] Reviewed Prior HEP/Ed  Method of Education: [x] Verbal  [] Demo  [] Written  Comprehension of Education:  [] Verbalizes understanding. [] Demonstrates understanding. [] Needs review. [x] Demonstrates/verbalizes HEP/Ed previously given. Plan: [x] Continue current frequency toward long and short term goals.     [] Specific Instructions for subsequent treatments:     Frequency:  2 x/week for 20 visits      Time In: 10:00am            Time Out: 11:00am    Electronically signed by:  Liliana Marrero PTA 04-Nov-2024